# Patient Record
Sex: MALE | Race: ASIAN | NOT HISPANIC OR LATINO | URBAN - METROPOLITAN AREA
[De-identification: names, ages, dates, MRNs, and addresses within clinical notes are randomized per-mention and may not be internally consistent; named-entity substitution may affect disease eponyms.]

---

## 2017-09-28 ENCOUNTER — EMERGENCY (EMERGENCY)
Facility: HOSPITAL | Age: 42
LOS: 1 days | Discharge: HOME | End: 2017-09-28
Attending: EMERGENCY MEDICINE | Admitting: EMERGENCY MEDICINE
Payer: MEDICAID

## 2017-09-28 VITALS
WEIGHT: 250 LBS | TEMPERATURE: 98 F | SYSTOLIC BLOOD PRESSURE: 146 MMHG | RESPIRATION RATE: 18 BRPM | DIASTOLIC BLOOD PRESSURE: 85 MMHG | HEIGHT: 68 IN | HEART RATE: 87 BPM | OXYGEN SATURATION: 99 %

## 2017-09-28 VITALS
HEART RATE: 88 BPM | RESPIRATION RATE: 14 BRPM | SYSTOLIC BLOOD PRESSURE: 128 MMHG | DIASTOLIC BLOOD PRESSURE: 96 MMHG | OXYGEN SATURATION: 100 %

## 2017-09-28 LAB
ALBUMIN SERPL ELPH-MCNC: 3.9 G/DL — SIGNIFICANT CHANGE UP (ref 3.3–5)
ALP SERPL-CCNC: 126 U/L — HIGH (ref 40–120)
ALT FLD-CCNC: 29 U/L — SIGNIFICANT CHANGE UP (ref 4–41)
APPEARANCE UR: SIGNIFICANT CHANGE UP
AST SERPL-CCNC: 20 U/L — SIGNIFICANT CHANGE UP (ref 4–40)
BASOPHILS # BLD AUTO: 0.06 K/UL — SIGNIFICANT CHANGE UP (ref 0–0.2)
BASOPHILS NFR BLD AUTO: 0.7 % — SIGNIFICANT CHANGE UP (ref 0–2)
BILIRUB SERPL-MCNC: 0.4 MG/DL — SIGNIFICANT CHANGE UP (ref 0.2–1.2)
BILIRUB UR-MCNC: SIGNIFICANT CHANGE UP
BLOOD UR QL VISUAL: HIGH
BUN SERPL-MCNC: 15 MG/DL — SIGNIFICANT CHANGE UP (ref 7–23)
CALCIUM SERPL-MCNC: 11.2 MG/DL — HIGH (ref 8.4–10.5)
CHLORIDE SERPL-SCNC: 110 MMOL/L — HIGH (ref 98–107)
CO2 SERPL-SCNC: 20 MMOL/L — LOW (ref 22–31)
COLOR SPEC: HIGH
CREAT SERPL-MCNC: 1.13 MG/DL — SIGNIFICANT CHANGE UP (ref 0.5–1.3)
EOSINOPHIL # BLD AUTO: 0.53 K/UL — HIGH (ref 0–0.5)
EOSINOPHIL NFR BLD AUTO: 5.8 % — SIGNIFICANT CHANGE UP (ref 0–6)
GLUCOSE SERPL-MCNC: 94 MG/DL — SIGNIFICANT CHANGE UP (ref 70–99)
GLUCOSE UR-MCNC: NEGATIVE — SIGNIFICANT CHANGE UP
HCT VFR BLD CALC: 48 % — SIGNIFICANT CHANGE UP (ref 39–50)
HGB BLD-MCNC: 15.6 G/DL — SIGNIFICANT CHANGE UP (ref 13–17)
IMM GRANULOCYTES # BLD AUTO: 0.04 # — SIGNIFICANT CHANGE UP
IMM GRANULOCYTES NFR BLD AUTO: 0.4 % — SIGNIFICANT CHANGE UP (ref 0–1.5)
KETONES UR-MCNC: SIGNIFICANT CHANGE UP
LEUKOCYTE ESTERASE UR-ACNC: HIGH
LYMPHOCYTES # BLD AUTO: 2.96 K/UL — SIGNIFICANT CHANGE UP (ref 1–3.3)
LYMPHOCYTES # BLD AUTO: 32.3 % — SIGNIFICANT CHANGE UP (ref 13–44)
MCHC RBC-ENTMCNC: 29.4 PG — SIGNIFICANT CHANGE UP (ref 27–34)
MCHC RBC-ENTMCNC: 32.5 % — SIGNIFICANT CHANGE UP (ref 32–36)
MCV RBC AUTO: 90.6 FL — SIGNIFICANT CHANGE UP (ref 80–100)
MONOCYTES # BLD AUTO: 0.77 K/UL — SIGNIFICANT CHANGE UP (ref 0–0.9)
MONOCYTES NFR BLD AUTO: 8.4 % — SIGNIFICANT CHANGE UP (ref 2–14)
NEUTROPHILS # BLD AUTO: 4.79 K/UL — SIGNIFICANT CHANGE UP (ref 1.8–7.4)
NEUTROPHILS NFR BLD AUTO: 52.4 % — SIGNIFICANT CHANGE UP (ref 43–77)
NITRITE UR-MCNC: POSITIVE — HIGH
NRBC # FLD: 0 — SIGNIFICANT CHANGE UP
PH UR: 6.5 — SIGNIFICANT CHANGE UP (ref 4.6–8)
PLATELET # BLD AUTO: 225 K/UL — SIGNIFICANT CHANGE UP (ref 150–400)
PMV BLD: 10.9 FL — SIGNIFICANT CHANGE UP (ref 7–13)
POTASSIUM SERPL-MCNC: 4.4 MMOL/L — SIGNIFICANT CHANGE UP (ref 3.5–5.3)
POTASSIUM SERPL-SCNC: 4.4 MMOL/L — SIGNIFICANT CHANGE UP (ref 3.5–5.3)
PROT SERPL-MCNC: 7.2 G/DL — SIGNIFICANT CHANGE UP (ref 6–8.3)
PROT UR-MCNC: 300 — SIGNIFICANT CHANGE UP
RBC # BLD: 5.3 M/UL — SIGNIFICANT CHANGE UP (ref 4.2–5.8)
RBC # FLD: 13.9 % — SIGNIFICANT CHANGE UP (ref 10.3–14.5)
RBC CASTS # UR COMP ASSIST: >50 — HIGH (ref 0–?)
SODIUM SERPL-SCNC: 141 MMOL/L — SIGNIFICANT CHANGE UP (ref 135–145)
SP GR SPEC: 1.01 — SIGNIFICANT CHANGE UP (ref 1–1.03)
UROBILINOGEN FLD QL: 4 E.U. — HIGH (ref 0.1–0.2)
WBC # BLD: 9.15 K/UL — SIGNIFICANT CHANGE UP (ref 3.8–10.5)
WBC # FLD AUTO: 9.15 K/UL — SIGNIFICANT CHANGE UP (ref 3.8–10.5)
WBC UR QL: >50 — HIGH (ref 0–?)

## 2017-09-28 PROCEDURE — 99284 EMERGENCY DEPT VISIT MOD MDM: CPT

## 2017-09-28 PROCEDURE — 74176 CT ABD & PELVIS W/O CONTRAST: CPT | Mod: 26

## 2017-09-28 RX ORDER — ONDANSETRON 8 MG/1
4 TABLET, FILM COATED ORAL ONCE
Qty: 0 | Refills: 0 | Status: COMPLETED | OUTPATIENT
Start: 2017-09-28 | End: 2017-09-28

## 2017-09-28 RX ORDER — MORPHINE SULFATE 50 MG/1
4 CAPSULE, EXTENDED RELEASE ORAL ONCE
Qty: 0 | Refills: 0 | Status: DISCONTINUED | OUTPATIENT
Start: 2017-09-28 | End: 2017-09-28

## 2017-09-28 RX ORDER — CEFTRIAXONE 500 MG/1
1 INJECTION, POWDER, FOR SOLUTION INTRAMUSCULAR; INTRAVENOUS ONCE
Qty: 0 | Refills: 0 | Status: COMPLETED | OUTPATIENT
Start: 2017-09-28 | End: 2017-09-28

## 2017-09-28 RX ORDER — ONDANSETRON 8 MG/1
1 TABLET, FILM COATED ORAL
Qty: 8 | Refills: 0
Start: 2017-09-28

## 2017-09-28 RX ORDER — IBUPROFEN 200 MG
1 TABLET ORAL
Qty: 30 | Refills: 0 | OUTPATIENT
Start: 2017-09-28

## 2017-09-28 RX ORDER — KETOROLAC TROMETHAMINE 30 MG/ML
30 SYRINGE (ML) INJECTION ONCE
Qty: 0 | Refills: 0 | Status: DISCONTINUED | OUTPATIENT
Start: 2017-09-28 | End: 2017-09-28

## 2017-09-28 RX ORDER — MOXIFLOXACIN HYDROCHLORIDE TABLETS, 400 MG 400 MG/1
1 TABLET, FILM COATED ORAL
Qty: 20 | Refills: 0
Start: 2017-09-28 | End: 2017-10-08

## 2017-09-28 RX ADMIN — Medication 30 MILLIGRAM(S): at 13:41

## 2017-09-28 RX ADMIN — CEFTRIAXONE 100 GRAM(S): 500 INJECTION, POWDER, FOR SOLUTION INTRAMUSCULAR; INTRAVENOUS at 14:19

## 2017-09-28 RX ADMIN — ONDANSETRON 4 MILLIGRAM(S): 8 TABLET, FILM COATED ORAL at 13:41

## 2017-09-28 RX ADMIN — MORPHINE SULFATE 4 MILLIGRAM(S): 50 CAPSULE, EXTENDED RELEASE ORAL at 13:41

## 2017-09-28 RX ADMIN — MORPHINE SULFATE 4 MILLIGRAM(S): 50 CAPSULE, EXTENDED RELEASE ORAL at 14:19

## 2017-09-28 RX ADMIN — Medication 30 MILLIGRAM(S): at 14:19

## 2017-09-28 NOTE — ED ADULT NURSE NOTE - OBJECTIVE STATEMENT
pt is a 41 yr old male c/o increased back pain with hematuria x 1 week when pt had renal stents placed. +n/v, pt unable to tolerate po intake, pain 8/10. piv placed, labs sent, see emar for tx

## 2017-09-28 NOTE — ED ADULT NURSE NOTE - CHIEF COMPLAINT QUOTE
pt presents c/o left lower back/flank pain, diagnosed with 2 "large 1-inch" renal colic last week, now with vomiting and cold sweats x 1 week. pt reports pain is worsening with chills and subjective fevers. pt reports hematuria and dysuria. denies use of blood thinners. pt reports taking flomax, pain meds without relief. pt also c/o "heaviness" to right flank. PMHX: renal stents, renal colic

## 2017-09-28 NOTE — ED PROVIDER NOTE - PROGRESS NOTE DETAILS
AJM: Pt feels significantly improved. CT shows patent stents. + UA. Cr normal. Consulted Urology resident who notes CT findings are not concerning based on recent stents. abx for uti. u cx sent. ceftriaxone given here. pt will follow up with urology out patient for stone retrieval. Pt also told of findings on ct regarding pancreas and need for follow up imaging with pmd. pt verbalized understanding of finding.

## 2017-09-28 NOTE — ED PROVIDER NOTE - MEDICAL DECISION MAKING DETAILS
pt with history of renal stones and ureteral stent placement presenting with worsening flank pain. will order ct a/p, pain meds, ua, cbc, cmp. pt already on flomax and pain meds at home

## 2017-09-28 NOTE — ED PROVIDER NOTE - OBJECTIVE STATEMENT
Patient is a 41 year old male with recent diagnosis of kidney stones with placement of ureteral stents presents with worsening right flank pain, nasuea vomiting. he notes pain is in b/l flanks and radiates to b/l groin. + hematuria, chills, sweating,. no abd pain, chest pain, trauma. Has not seen urologist since procedure done in [place] last week Patient is a 41 year old male with recent diagnosis of kidney stones with placement of ureteral stents presents with worsening right flank pain, nausea vomiting. he notes pain is in b/l flanks and radiates to b/l groin. + hematuria, chills, sweating,. no abd pain, chest pain, trauma. Has not seen urologist since procedure done in [place] last week

## 2017-09-29 ENCOUNTER — APPOINTMENT (OUTPATIENT)
Dept: UROLOGY | Facility: CLINIC | Age: 42
End: 2017-09-29
Payer: MEDICAID

## 2017-09-29 VITALS
SYSTOLIC BLOOD PRESSURE: 133 MMHG | HEART RATE: 90 BPM | DIASTOLIC BLOOD PRESSURE: 88 MMHG | WEIGHT: 245 LBS | HEIGHT: 68 IN | BODY MASS INDEX: 37.13 KG/M2 | TEMPERATURE: 98 F

## 2017-09-29 DIAGNOSIS — Z86.39 PERSONAL HISTORY OF OTHER ENDOCRINE, NUTRITIONAL AND METABOLIC DISEASE: ICD-10-CM

## 2017-09-29 DIAGNOSIS — Z78.9 OTHER SPECIFIED HEALTH STATUS: ICD-10-CM

## 2017-09-29 LAB
BACTERIA UR CULT: SIGNIFICANT CHANGE UP
SPECIMEN SOURCE: SIGNIFICANT CHANGE UP

## 2017-09-29 PROCEDURE — 99204 OFFICE O/P NEW MOD 45 MIN: CPT

## 2017-09-29 PROCEDURE — 81003 URINALYSIS AUTO W/O SCOPE: CPT | Mod: QW

## 2017-09-30 LAB
BILIRUB UR QL STRIP: NORMAL
CLARITY UR: NORMAL
COLLECTION METHOD: NORMAL
GLUCOSE UR-MCNC: NORMAL
HCG UR QL: 1 EU/DL
HGB UR QL STRIP.AUTO: NORMAL
KETONES UR-MCNC: NORMAL
LEUKOCYTE ESTERASE UR QL STRIP: NORMAL
NITRITE UR QL STRIP: POSITIVE
PH UR STRIP: 6
PROT UR STRIP-MCNC: NORMAL
SP GR UR STRIP: 1.02

## 2017-10-03 ENCOUNTER — APPOINTMENT (OUTPATIENT)
Dept: UROLOGY | Facility: CLINIC | Age: 42
End: 2017-10-03

## 2017-10-04 ENCOUNTER — APPOINTMENT (OUTPATIENT)
Dept: UROLOGY | Facility: CLINIC | Age: 42
End: 2017-10-04
Payer: MEDICAID

## 2017-10-04 PROCEDURE — 99213 OFFICE O/P EST LOW 20 MIN: CPT

## 2017-10-16 RX ORDER — OXYCODONE HYDROCHLORIDE 5 MG/1
10 TABLET ORAL EVERY 6 HOURS
Qty: 0 | Refills: 0 | Status: DISCONTINUED | OUTPATIENT
Start: 2017-10-17 | End: 2017-10-17

## 2017-10-17 ENCOUNTER — FORM ENCOUNTER (OUTPATIENT)
Age: 42
End: 2017-10-17

## 2017-10-17 ENCOUNTER — OTHER (OUTPATIENT)
Age: 42
End: 2017-10-17

## 2017-10-17 ENCOUNTER — APPOINTMENT (OUTPATIENT)
Dept: UROLOGY | Facility: HOSPITAL | Age: 42
End: 2017-10-17
Payer: MEDICAID

## 2017-10-17 ENCOUNTER — INPATIENT (INPATIENT)
Facility: HOSPITAL | Age: 42
LOS: 1 days | Discharge: HOME | End: 2017-10-19
Attending: STUDENT IN AN ORGANIZED HEALTH CARE EDUCATION/TRAINING PROGRAM | Admitting: STUDENT IN AN ORGANIZED HEALTH CARE EDUCATION/TRAINING PROGRAM
Payer: MEDICAID

## 2017-10-17 ENCOUNTER — RESULT REVIEW (OUTPATIENT)
Age: 42
End: 2017-10-17

## 2017-10-17 VITALS
WEIGHT: 245.59 LBS | HEIGHT: 68 IN | HEART RATE: 85 BPM | OXYGEN SATURATION: 98 % | RESPIRATION RATE: 14 BRPM | TEMPERATURE: 98 F | DIASTOLIC BLOOD PRESSURE: 76 MMHG | SYSTOLIC BLOOD PRESSURE: 107 MMHG

## 2017-10-17 DIAGNOSIS — Z98.890 OTHER SPECIFIED POSTPROCEDURAL STATES: Chronic | ICD-10-CM

## 2017-10-17 LAB
ABO RH CONFIRMATION: SIGNIFICANT CHANGE UP
ANION GAP SERPL CALC-SCNC: 5 MMOL/L — SIGNIFICANT CHANGE UP (ref 5–17)
ANION GAP SERPL CALC-SCNC: 5 MMOL/L — SIGNIFICANT CHANGE UP (ref 5–17)
BASOPHILS # BLD AUTO: 0.1 K/UL — SIGNIFICANT CHANGE UP (ref 0–0.2)
BASOPHILS NFR BLD AUTO: 0.7 % — SIGNIFICANT CHANGE UP (ref 0–2)
BLD GP AB SCN SERPL QL: SIGNIFICANT CHANGE UP
BUN SERPL-MCNC: 18 MG/DL — SIGNIFICANT CHANGE UP (ref 7–23)
BUN SERPL-MCNC: 22 MG/DL — SIGNIFICANT CHANGE UP (ref 7–23)
CALCIUM SERPL-MCNC: 10.6 MG/DL — HIGH (ref 8.5–10.1)
CALCIUM SERPL-MCNC: 8.7 MG/DL — SIGNIFICANT CHANGE UP (ref 8.5–10.1)
CHLORIDE SERPL-SCNC: 112 MMOL/L — HIGH (ref 96–108)
CHLORIDE SERPL-SCNC: 119 MMOL/L — HIGH (ref 96–108)
CO2 SERPL-SCNC: 20 MMOL/L — LOW (ref 22–31)
CO2 SERPL-SCNC: 23 MMOL/L — SIGNIFICANT CHANGE UP (ref 22–31)
CREAT SERPL-MCNC: 1.27 MG/DL — SIGNIFICANT CHANGE UP (ref 0.5–1.3)
CREAT SERPL-MCNC: 1.36 MG/DL — HIGH (ref 0.5–1.3)
EOSINOPHIL # BLD AUTO: 0.7 K/UL — HIGH (ref 0–0.5)
EOSINOPHIL NFR BLD AUTO: 5.8 % — SIGNIFICANT CHANGE UP (ref 0–6)
GLUCOSE BLDC GLUCOMTR-MCNC: 111 MG/DL — HIGH (ref 70–99)
GLUCOSE SERPL-MCNC: 107 MG/DL — HIGH (ref 70–99)
GLUCOSE SERPL-MCNC: 132 MG/DL — HIGH (ref 70–99)
HCT VFR BLD CALC: 39 % — SIGNIFICANT CHANGE UP (ref 39–50)
HCT VFR BLD CALC: 39.8 % — SIGNIFICANT CHANGE UP (ref 39–50)
HCT VFR BLD CALC: 46.9 % — SIGNIFICANT CHANGE UP (ref 39–50)
HGB BLD-MCNC: 13.2 G/DL — SIGNIFICANT CHANGE UP (ref 13–17)
HGB BLD-MCNC: 13.2 G/DL — SIGNIFICANT CHANGE UP (ref 13–17)
HGB BLD-MCNC: 15.6 G/DL — SIGNIFICANT CHANGE UP (ref 13–17)
INR BLD: 1.08 RATIO — SIGNIFICANT CHANGE UP (ref 0.88–1.16)
LYMPHOCYTES # BLD AUTO: 2.9 K/UL — SIGNIFICANT CHANGE UP (ref 1–3.3)
LYMPHOCYTES # BLD AUTO: 25 % — SIGNIFICANT CHANGE UP (ref 13–44)
MCHC RBC-ENTMCNC: 30 PG — SIGNIFICANT CHANGE UP (ref 27–34)
MCHC RBC-ENTMCNC: 30.2 PG — SIGNIFICANT CHANGE UP (ref 27–34)
MCHC RBC-ENTMCNC: 30.5 PG — SIGNIFICANT CHANGE UP (ref 27–34)
MCHC RBC-ENTMCNC: 33.1 GM/DL — SIGNIFICANT CHANGE UP (ref 32–36)
MCHC RBC-ENTMCNC: 33.3 GM/DL — SIGNIFICANT CHANGE UP (ref 32–36)
MCHC RBC-ENTMCNC: 33.8 GM/DL — SIGNIFICANT CHANGE UP (ref 32–36)
MCV RBC AUTO: 90.2 FL — SIGNIFICANT CHANGE UP (ref 80–100)
MCV RBC AUTO: 90.3 FL — SIGNIFICANT CHANGE UP (ref 80–100)
MCV RBC AUTO: 91.4 FL — SIGNIFICANT CHANGE UP (ref 80–100)
MONOCYTES # BLD AUTO: 0.9 K/UL — SIGNIFICANT CHANGE UP (ref 0–0.9)
MONOCYTES NFR BLD AUTO: 7.9 % — SIGNIFICANT CHANGE UP (ref 2–14)
NEUTROPHILS # BLD AUTO: 7 K/UL — SIGNIFICANT CHANGE UP (ref 1.8–7.4)
NEUTROPHILS NFR BLD AUTO: 60.6 % — SIGNIFICANT CHANGE UP (ref 43–77)
PLATELET # BLD AUTO: 185 K/UL — SIGNIFICANT CHANGE UP (ref 150–400)
PLATELET # BLD AUTO: 211 K/UL — SIGNIFICANT CHANGE UP (ref 150–400)
PLATELET # BLD AUTO: 214 K/UL — SIGNIFICANT CHANGE UP (ref 150–400)
POTASSIUM SERPL-MCNC: 4.3 MMOL/L — SIGNIFICANT CHANGE UP (ref 3.5–5.3)
POTASSIUM SERPL-MCNC: 4.7 MMOL/L — SIGNIFICANT CHANGE UP (ref 3.5–5.3)
POTASSIUM SERPL-SCNC: 4.3 MMOL/L — SIGNIFICANT CHANGE UP (ref 3.5–5.3)
POTASSIUM SERPL-SCNC: 4.7 MMOL/L — SIGNIFICANT CHANGE UP (ref 3.5–5.3)
PROTHROM AB SERPL-ACNC: 11.7 SEC — SIGNIFICANT CHANGE UP (ref 9.8–12.7)
RBC # BLD: 4.32 M/UL — SIGNIFICANT CHANGE UP (ref 4.2–5.8)
RBC # BLD: 4.36 M/UL — SIGNIFICANT CHANGE UP (ref 4.2–5.8)
RBC # BLD: 5.2 M/UL — SIGNIFICANT CHANGE UP (ref 4.2–5.8)
RBC # FLD: 12.3 % — SIGNIFICANT CHANGE UP (ref 10.3–14.5)
RBC # FLD: 12.4 % — SIGNIFICANT CHANGE UP (ref 10.3–14.5)
RBC # FLD: 12.5 % — SIGNIFICANT CHANGE UP (ref 10.3–14.5)
SODIUM SERPL-SCNC: 140 MMOL/L — SIGNIFICANT CHANGE UP (ref 135–145)
SODIUM SERPL-SCNC: 144 MMOL/L — SIGNIFICANT CHANGE UP (ref 135–145)
TYPE + AB SCN PNL BLD: SIGNIFICANT CHANGE UP
WBC # BLD: 10.5 K/UL — SIGNIFICANT CHANGE UP (ref 3.8–10.5)
WBC # BLD: 11.6 K/UL — HIGH (ref 3.8–10.5)
WBC # BLD: 15 K/UL — HIGH (ref 3.8–10.5)
WBC # FLD AUTO: 10.5 K/UL — SIGNIFICANT CHANGE UP (ref 3.8–10.5)
WBC # FLD AUTO: 11.6 K/UL — HIGH (ref 3.8–10.5)
WBC # FLD AUTO: 15 K/UL — HIGH (ref 3.8–10.5)

## 2017-10-17 PROCEDURE — 50432 PLMT NEPHROSTOMY CATHETER: CPT | Mod: RT

## 2017-10-17 PROCEDURE — 88300 SURGICAL PATH GROSS: CPT | Mod: 26

## 2017-10-17 PROCEDURE — 50081 PERQ NL/PL LITHOTRP CPLX>2CM: CPT | Mod: RT

## 2017-10-17 RX ORDER — ONDANSETRON 8 MG/1
4 TABLET, FILM COATED ORAL ONCE
Qty: 0 | Refills: 0 | Status: DISCONTINUED | OUTPATIENT
Start: 2017-10-17 | End: 2017-10-17

## 2017-10-17 RX ORDER — ONDANSETRON 8 MG/1
4 TABLET, FILM COATED ORAL EVERY 6 HOURS
Qty: 0 | Refills: 0 | Status: DISCONTINUED | OUTPATIENT
Start: 2017-10-17 | End: 2017-10-19

## 2017-10-17 RX ORDER — PIPERACILLIN AND TAZOBACTAM 4; .5 G/20ML; G/20ML
3.38 INJECTION, POWDER, LYOPHILIZED, FOR SOLUTION INTRAVENOUS ONCE
Qty: 0 | Refills: 0 | Status: COMPLETED | OUTPATIENT
Start: 2017-10-17 | End: 2017-10-17

## 2017-10-17 RX ORDER — ACETAMINOPHEN 500 MG
650 TABLET ORAL EVERY 6 HOURS
Qty: 0 | Refills: 0 | Status: DISCONTINUED | OUTPATIENT
Start: 2017-10-17 | End: 2017-10-19

## 2017-10-17 RX ORDER — HYDROMORPHONE HYDROCHLORIDE 2 MG/ML
1 INJECTION INTRAMUSCULAR; INTRAVENOUS; SUBCUTANEOUS ONCE
Qty: 0 | Refills: 0 | Status: DISCONTINUED | OUTPATIENT
Start: 2017-10-17 | End: 2017-10-17

## 2017-10-17 RX ORDER — KETOROLAC TROMETHAMINE 30 MG/ML
30 SYRINGE (ML) INJECTION EVERY 6 HOURS
Qty: 0 | Refills: 0 | Status: DISCONTINUED | OUTPATIENT
Start: 2017-10-17 | End: 2017-10-19

## 2017-10-17 RX ORDER — MORPHINE SULFATE 50 MG/1
2 CAPSULE, EXTENDED RELEASE ORAL EVERY 6 HOURS
Qty: 0 | Refills: 0 | Status: DISCONTINUED | OUTPATIENT
Start: 2017-10-17 | End: 2017-10-19

## 2017-10-17 RX ORDER — FENTANYL CITRATE 50 UG/ML
50 INJECTION INTRAVENOUS
Qty: 0 | Refills: 0 | Status: DISCONTINUED | OUTPATIENT
Start: 2017-10-17 | End: 2017-10-17

## 2017-10-17 RX ORDER — VANCOMYCIN HCL 1 G
1000 VIAL (EA) INTRAVENOUS ONCE
Qty: 0 | Refills: 0 | Status: COMPLETED | OUTPATIENT
Start: 2017-10-17 | End: 2017-10-18

## 2017-10-17 RX ORDER — SODIUM CHLORIDE 9 MG/ML
1000 INJECTION, SOLUTION INTRAVENOUS
Qty: 0 | Refills: 0 | Status: DISCONTINUED | OUTPATIENT
Start: 2017-10-17 | End: 2017-10-17

## 2017-10-17 RX ORDER — TAMSULOSIN HYDROCHLORIDE 0.4 MG/1
0.4 CAPSULE ORAL DAILY
Qty: 0 | Refills: 0 | Status: DISCONTINUED | OUTPATIENT
Start: 2017-10-17 | End: 2017-10-19

## 2017-10-17 RX ORDER — SODIUM CHLORIDE 9 MG/ML
1000 INJECTION INTRAMUSCULAR; INTRAVENOUS; SUBCUTANEOUS
Qty: 0 | Refills: 0 | Status: DISCONTINUED | OUTPATIENT
Start: 2017-10-17 | End: 2017-10-17

## 2017-10-17 RX ORDER — SODIUM CHLORIDE 9 MG/ML
1000 INJECTION INTRAMUSCULAR; INTRAVENOUS; SUBCUTANEOUS
Qty: 0 | Refills: 0 | Status: COMPLETED | OUTPATIENT
Start: 2017-10-17 | End: 2017-10-18

## 2017-10-17 RX ORDER — PHENAZOPYRIDINE HCL 100 MG
100 TABLET ORAL THREE TIMES A DAY
Qty: 0 | Refills: 0 | Status: DISCONTINUED | OUTPATIENT
Start: 2017-10-17 | End: 2017-10-19

## 2017-10-17 RX ORDER — OXYBUTYNIN CHLORIDE 5 MG
5 TABLET ORAL THREE TIMES A DAY
Qty: 0 | Refills: 0 | Status: DISCONTINUED | OUTPATIENT
Start: 2017-10-17 | End: 2017-10-19

## 2017-10-17 RX ORDER — PIPERACILLIN AND TAZOBACTAM 4; .5 G/20ML; G/20ML
3.38 INJECTION, POWDER, LYOPHILIZED, FOR SOLUTION INTRAVENOUS EVERY 8 HOURS
Qty: 0 | Refills: 0 | Status: DISCONTINUED | OUTPATIENT
Start: 2017-10-17 | End: 2017-10-19

## 2017-10-17 RX ORDER — SODIUM CHLORIDE 9 MG/ML
1000 INJECTION INTRAMUSCULAR; INTRAVENOUS; SUBCUTANEOUS
Qty: 0 | Refills: 0 | Status: DISCONTINUED | OUTPATIENT
Start: 2017-10-17 | End: 2017-10-19

## 2017-10-17 RX ADMIN — FENTANYL CITRATE 50 MICROGRAM(S): 50 INJECTION INTRAVENOUS at 19:30

## 2017-10-17 RX ADMIN — ONDANSETRON 4 MILLIGRAM(S): 8 TABLET, FILM COATED ORAL at 20:45

## 2017-10-17 RX ADMIN — OXYCODONE HYDROCHLORIDE 10 MILLIGRAM(S): 5 TABLET ORAL at 17:57

## 2017-10-17 RX ADMIN — Medication 650 MILLIGRAM(S): at 22:35

## 2017-10-17 RX ADMIN — FENTANYL CITRATE 50 MICROGRAM(S): 50 INJECTION INTRAVENOUS at 18:13

## 2017-10-17 RX ADMIN — MORPHINE SULFATE 2 MILLIGRAM(S): 50 CAPSULE, EXTENDED RELEASE ORAL at 22:45

## 2017-10-17 RX ADMIN — FENTANYL CITRATE 50 MICROGRAM(S): 50 INJECTION INTRAVENOUS at 17:27

## 2017-10-17 RX ADMIN — HYDROMORPHONE HYDROCHLORIDE 1 MILLIGRAM(S): 2 INJECTION INTRAMUSCULAR; INTRAVENOUS; SUBCUTANEOUS at 23:49

## 2017-10-17 RX ADMIN — FENTANYL CITRATE 50 MICROGRAM(S): 50 INJECTION INTRAVENOUS at 17:06

## 2017-10-17 RX ADMIN — OXYCODONE HYDROCHLORIDE 10 MILLIGRAM(S): 5 TABLET ORAL at 17:49

## 2017-10-17 RX ADMIN — TAMSULOSIN HYDROCHLORIDE 0.4 MILLIGRAM(S): 0.4 CAPSULE ORAL at 22:35

## 2017-10-17 RX ADMIN — Medication 30 MILLIGRAM(S): at 19:41

## 2017-10-17 RX ADMIN — Medication 100 MILLIGRAM(S): at 22:57

## 2017-10-17 RX ADMIN — SODIUM CHLORIDE 100 MILLILITER(S): 9 INJECTION INTRAMUSCULAR; INTRAVENOUS; SUBCUTANEOUS at 12:52

## 2017-10-17 RX ADMIN — FENTANYL CITRATE 50 MICROGRAM(S): 50 INJECTION INTRAVENOUS at 16:57

## 2017-10-17 RX ADMIN — SODIUM CHLORIDE 125 MILLILITER(S): 9 INJECTION INTRAMUSCULAR; INTRAVENOUS; SUBCUTANEOUS at 22:36

## 2017-10-17 RX ADMIN — SODIUM CHLORIDE 1000 MILLILITER(S): 9 INJECTION INTRAMUSCULAR; INTRAVENOUS; SUBCUTANEOUS at 23:39

## 2017-10-17 RX ADMIN — SODIUM CHLORIDE 100 MILLILITER(S): 9 INJECTION INTRAMUSCULAR; INTRAVENOUS; SUBCUTANEOUS at 17:06

## 2017-10-17 RX ADMIN — FENTANYL CITRATE 50 MICROGRAM(S): 50 INJECTION INTRAVENOUS at 12:52

## 2017-10-17 RX ADMIN — Medication 30 MILLIGRAM(S): at 19:37

## 2017-10-17 RX ADMIN — PIPERACILLIN AND TAZOBACTAM 200 GRAM(S): 4; .5 INJECTION, POWDER, LYOPHILIZED, FOR SOLUTION INTRAVENOUS at 23:43

## 2017-10-17 NOTE — BRIEF OPERATIVE NOTE - PROCEDURE
<<-----Click on this checkbox to enter Procedure Percutaneous nephrolithotomy  10/17/2017  RIGHT PCNL  Active  [email or web address]

## 2017-10-17 NOTE — BRIEF OPERATIVE NOTE - OPERATION/FINDINGS
sono and fluoro. mutiple large stones right kidney. lower pole accessed. able to cross calyceal and infundibular stones, but not upj stone with mutiple catheter and wire combinations. following d/w urologist. 8fr pcn left in place for access. there is also an indwelling JJ stent which may be helpful for retrograde access if needed. pcn left to gravity draining bloody urine.
Multiple large stones in right lower pole cleared fragmented and extracted. Renal pelvis stone could not be reached

## 2017-10-17 NOTE — CHART NOTE - NSCHARTNOTEFT_GEN_A_CORE
Code Sepsis was called on this patient at 22:50 .    HPI : 42 y/o man with right flank pain admitted for kidney stones s/p Right percutaneous nephrolithotomy today by Urology (Dr. Root). Patient was noted to have right flank swelling and erythema by RN. Vitals and ROS are as below.      REVIEW OF SYSTEMS:  CONSTITUTIONAL: Fevers, chills  EYES/ENT: No visual changes;  No vertigo or throat pain   NECK: No pain or stiffness  RESPIRATORY: No cough, wheezing, hemoptysis; No shortness of breath  CARDIOVASCULAR: No chest pain or palpitations  GASTROINTESTINAL: No abdominal or epigastric pain. No nausea, vomiting, or hematemesis; No diarrhea or constipation. No melena or hematochezia.  GENITOURINARY: + Right sided flank pain; voiding via Garcia  NEUROLOGICAL: No numbness or weakness  All other review of systems is negative unless indicated above    Vital Signs Last 24 Hrs  T(C): 38.9 (17 Oct 2017 22:59), Max: 38.9 (17 Oct 2017 22:59)  T(F): 102.1 (17 Oct 2017 22:59), Max: 102.1 (17 Oct 2017 22:59)  HR: 115 (17 Oct 2017 22:59) (59 - 116)  BP: 115/72 (17 Oct 2017 22:59) (107/76 - 144/92)  RR: 22 (17 Oct 2017 22:59) (12 - 22)  SpO2: 98% (17 Oct 2017 22:59) (96% - 100%)    I&O's Summary    17 Oct 2017 07:01  -  17 Oct 2017 23:14  --------------------------------------------------------  IN: 2900 mL / OUT: 1900 mL / NET: 1000 mL      CAPILLARY BLOOD GLUCOSE  POCT Blood Glucose.: 111 mg/dL (17 Oct 2017 23:07)      PHYSICAL EXAM:  Constitutional: shaking in rigors, in distress due to fever and pain  HEENT: PERR, EOMI, Normal Hearing, MMM  Neck: Soft and supple, No LAD, No JVD  Respiratory: Breath sounds are clear bilaterally, No wheezing, rales or rhonchi  Cardiovascular: S1 and S2, Tachycardic, regular rhythm, no Murmurs, gallops or rubs  Gastrointestinal: Bowel Sounds present, soft, nontender, nondistended, no guarding, no rebound  Genitourinary: + Right CVA tenderness; right flank erythema with underlying hematoma soft and tender to touch; Garcia catheter in place  Extremities: No peripheral edema  Vascular: 2+ peripheral pulses  Neurological: A/O x 3, no focal deficits  Musculoskeletal: 5/5 strength b/l upper and lower extremities  Skin: No rashes    MEDICATIONS:  MEDICATIONS  (STANDING):  phenazopyridine 100 milliGRAM(s) Oral three times a day  sodium chloride 0.9%. 1000 milliLiter(s) (125 mL/Hr) IV Continuous <Continuous>  tamsulosin 0.4 milliGRAM(s) Oral daily      LABS: All Labs Reviewed:                        13.2   15.0  )-----------( 185      ( 17 Oct 2017 19:37 )             39.0     10-17    144  |  119<H>  |  18  ----------------------------<  132<H>  4.7   |  20<L>  |  1.36<H>    Ca    8.7      17 Oct 2017 19:37      PT/INR - ( 17 Oct 2017 09:00 )   PT: 11.7 sec;   INR: 1.08 ratio         A/P  42 y/o man with right flank pain admitted for kidney stones s/p Right percutaneous nephrolithotomy today presents tonight with fever to 102.1, tachycardia to 115, with child and rigors on physical exam. Patient has Garcia in place and has right flank pain, swelling and erythema.  - Bolus of NS  -  UA STAT  - CBC, BMP, Lactate STAT  - Blood Cx STAT  - IV Zosyn/ Vanco STAT x 1  - F/U ID Consult    Case d/w Dr. Rolly Raygoza (PGY-3) Code Sepsis was called on this patient at 22:50 .    HPI : 42 y/o man with right flank pain admitted for kidney stones s/p Right percutaneous nephrolithotomy today by Urology (Dr. Root). Patient was noted to have right flank swelling and erythema by RN. Vitals and ROS are as below.      REVIEW OF SYSTEMS:  CONSTITUTIONAL: Fevers, chills  EYES/ENT: No visual changes;  No vertigo or throat pain   NECK: No pain or stiffness  RESPIRATORY: No cough, wheezing, hemoptysis; No shortness of breath  CARDIOVASCULAR: No chest pain or palpitations  GASTROINTESTINAL: No abdominal or epigastric pain. No nausea, vomiting, or hematemesis; No diarrhea or constipation. No melena or hematochezia.  GENITOURINARY: + Right sided flank pain; voiding via Garcia  NEUROLOGICAL: No numbness or weakness  All other review of systems is negative unless indicated above    Vital Signs Last 24 Hrs  T(C): 38.9 (17 Oct 2017 22:59), Max: 38.9 (17 Oct 2017 22:59)  T(F): 102.1 (17 Oct 2017 22:59), Max: 102.1 (17 Oct 2017 22:59)  HR: 115 (17 Oct 2017 22:59) (59 - 116)  BP: 115/72 (17 Oct 2017 22:59) (107/76 - 144/92)  RR: 22 (17 Oct 2017 22:59) (12 - 22)  SpO2: 98% (17 Oct 2017 22:59) (96% - 100%)    I&O's Summary    17 Oct 2017 07:01  -  17 Oct 2017 23:14  --------------------------------------------------------  IN: 2900 mL / OUT: 1900 mL / NET: 1000 mL      CAPILLARY BLOOD GLUCOSE  POCT Blood Glucose.: 111 mg/dL (17 Oct 2017 23:07)      PHYSICAL EXAM:  Constitutional: shaking in rigors, in distress due to fever and pain  HEENT: PERR, EOMI, Normal Hearing, MMM  Neck: Soft and supple, No LAD, No JVD  Respiratory: Breath sounds are clear bilaterally, No wheezing, rales or rhonchi  Cardiovascular: S1 and S2, Tachycardic, regular rhythm, no Murmurs, gallops or rubs  Gastrointestinal: Bowel Sounds present, soft, nontender, nondistended, no guarding, no rebound  Genitourinary: + Right CVA tenderness; right flank erythema with underlying hematoma soft and tender to touch; Garcia catheter in place  Extremities: No peripheral edema  Vascular: 2+ peripheral pulses  Neurological: A/O x 3, no focal deficits  Musculoskeletal: 5/5 strength b/l upper and lower extremities  Skin: No rashes    MEDICATIONS:  MEDICATIONS  (STANDING):  phenazopyridine 100 milliGRAM(s) Oral three times a day  sodium chloride 0.9%. 1000 milliLiter(s) (125 mL/Hr) IV Continuous <Continuous>  tamsulosin 0.4 milliGRAM(s) Oral daily      LABS: All Labs Reviewed:                        13.2   15.0  )-----------( 185      ( 17 Oct 2017 19:37 )             39.0     10-17    144  |  119<H>  |  18  ----------------------------<  132<H>  4.7   |  20<L>  |  1.36<H>    Ca    8.7      17 Oct 2017 19:37      PT/INR - ( 17 Oct 2017 09:00 )   PT: 11.7 sec;   INR: 1.08 ratio         A/P  42 y/o man with right flank pain admitted for kidney stones s/p Right percutaneous nephrolithotomy today presents tonight with fever to 102.1, tachycardia to 115, with child and rigors on physical exam. Patient has Garcia in place and has right flank pain, swelling and erythema.  - 3L Bolus of NS  -  UA STAT  - CBC, BMP, Lactate STAT  - Blood Cx STAT  - IV Zosyn/ Vanco STAT x 1  - F/U ID Consult    Case d/w Dr. Rolly Raygoza (PGY-3)

## 2017-10-17 NOTE — PROVIDER CONTACT NOTE (CHANGE IN STATUS NOTIFICATION) - BACKGROUND
S/P Right percutaneous nephrolithotomy today with MD Root  with known hematoma right flank  Garcia in place red output

## 2017-10-17 NOTE — PROVIDER CONTACT NOTE (OTHER) - ASSESSMENT
Right flank noted to be swollen, bruised, very tender to touch, and warm.  VSS.  No other complaints

## 2017-10-17 NOTE — PATIENT PROFILE ADULT. - TEACHING/LEARNING LEARNING PREFERENCES
written material/verbal instruction/skill demonstration/computer/internet/video/individual instruction

## 2017-10-17 NOTE — BRIEF OPERATIVE NOTE - PROCEDURE
<<-----Click on this checkbox to enter Procedure Nephrostomy using imaging guidance  10/17/2017    Active  [id number]

## 2017-10-18 LAB
ANION GAP SERPL CALC-SCNC: 5 MMOL/L — SIGNIFICANT CHANGE UP (ref 5–17)
ANION GAP SERPL CALC-SCNC: 8 MMOL/L — SIGNIFICANT CHANGE UP (ref 5–17)
APPEARANCE UR: (no result)
BACTERIA # UR AUTO: (no result)
BASOPHILS # BLD AUTO: 0 K/UL — SIGNIFICANT CHANGE UP (ref 0–0.2)
BASOPHILS NFR BLD AUTO: 0.3 % — SIGNIFICANT CHANGE UP (ref 0–2)
BILIRUB UR-MCNC: NEGATIVE — SIGNIFICANT CHANGE UP
BUN SERPL-MCNC: 15 MG/DL — SIGNIFICANT CHANGE UP (ref 7–23)
BUN SERPL-MCNC: 15 MG/DL — SIGNIFICANT CHANGE UP (ref 7–23)
CALCIUM SERPL-MCNC: 8.8 MG/DL — SIGNIFICANT CHANGE UP (ref 8.5–10.1)
CALCIUM SERPL-MCNC: 8.9 MG/DL — SIGNIFICANT CHANGE UP (ref 8.5–10.1)
CHLORIDE SERPL-SCNC: 118 MMOL/L — HIGH (ref 96–108)
CHLORIDE SERPL-SCNC: 119 MMOL/L — HIGH (ref 96–108)
CO2 SERPL-SCNC: 19 MMOL/L — LOW (ref 22–31)
CO2 SERPL-SCNC: 21 MMOL/L — LOW (ref 22–31)
COLOR SPEC: (no result)
CREAT SERPL-MCNC: 1.36 MG/DL — HIGH (ref 0.5–1.3)
CREAT SERPL-MCNC: 1.42 MG/DL — HIGH (ref 0.5–1.3)
DIFF PNL FLD: (no result)
EOSINOPHIL # BLD AUTO: 0 K/UL — SIGNIFICANT CHANGE UP (ref 0–0.5)
EOSINOPHIL NFR BLD AUTO: 0.2 % — SIGNIFICANT CHANGE UP (ref 0–6)
EPI CELLS # UR: SIGNIFICANT CHANGE UP
GLUCOSE SERPL-MCNC: 104 MG/DL — HIGH (ref 70–99)
GLUCOSE SERPL-MCNC: 115 MG/DL — HIGH (ref 70–99)
GLUCOSE UR QL: NEGATIVE MG/DL — SIGNIFICANT CHANGE UP
HCT VFR BLD CALC: 30.5 % — LOW (ref 39–50)
HCT VFR BLD CALC: 35.9 % — LOW (ref 39–50)
HGB BLD-MCNC: 10.3 G/DL — LOW (ref 13–17)
HGB BLD-MCNC: 11.7 G/DL — LOW (ref 13–17)
KETONES UR-MCNC: NEGATIVE — SIGNIFICANT CHANGE UP
LACTATE SERPL-SCNC: 1.4 MMOL/L — SIGNIFICANT CHANGE UP (ref 0.7–2)
LDH SERPL L TO P-CCNC: 157 U/L — SIGNIFICANT CHANGE UP (ref 50–242)
LEUKOCYTE ESTERASE UR-ACNC: (no result)
LYMPHOCYTES # BLD AUTO: 1.3 K/UL — SIGNIFICANT CHANGE UP (ref 1–3.3)
LYMPHOCYTES # BLD AUTO: 10 % — LOW (ref 13–44)
MCHC RBC-ENTMCNC: 29.8 PG — SIGNIFICANT CHANGE UP (ref 27–34)
MCHC RBC-ENTMCNC: 30.2 PG — SIGNIFICANT CHANGE UP (ref 27–34)
MCHC RBC-ENTMCNC: 32.6 GM/DL — SIGNIFICANT CHANGE UP (ref 32–36)
MCHC RBC-ENTMCNC: 33.8 GM/DL — SIGNIFICANT CHANGE UP (ref 32–36)
MCV RBC AUTO: 89.4 FL — SIGNIFICANT CHANGE UP (ref 80–100)
MCV RBC AUTO: 91.3 FL — SIGNIFICANT CHANGE UP (ref 80–100)
MONOCYTES # BLD AUTO: 1.2 K/UL — HIGH (ref 0–0.9)
MONOCYTES NFR BLD AUTO: 9.2 % — SIGNIFICANT CHANGE UP (ref 2–14)
NEUTROPHILS # BLD AUTO: 10.4 K/UL — HIGH (ref 1.8–7.4)
NEUTROPHILS NFR BLD AUTO: 80.4 % — HIGH (ref 43–77)
NITRITE UR-MCNC: NEGATIVE — SIGNIFICANT CHANGE UP
PH UR: 5 — SIGNIFICANT CHANGE UP (ref 5–8)
PLATELET # BLD AUTO: 157 K/UL — SIGNIFICANT CHANGE UP (ref 150–400)
PLATELET # BLD AUTO: 184 K/UL — SIGNIFICANT CHANGE UP (ref 150–400)
POTASSIUM SERPL-MCNC: 4.2 MMOL/L — SIGNIFICANT CHANGE UP (ref 3.5–5.3)
POTASSIUM SERPL-MCNC: 4.5 MMOL/L — SIGNIFICANT CHANGE UP (ref 3.5–5.3)
POTASSIUM SERPL-SCNC: 4.2 MMOL/L — SIGNIFICANT CHANGE UP (ref 3.5–5.3)
POTASSIUM SERPL-SCNC: 4.5 MMOL/L — SIGNIFICANT CHANGE UP (ref 3.5–5.3)
PROT UR-MCNC: 100 MG/DL
RBC # BLD: 3.42 M/UL — LOW (ref 4.2–5.8)
RBC # BLD: 3.93 M/UL — LOW (ref 4.2–5.8)
RBC # FLD: 12 % — SIGNIFICANT CHANGE UP (ref 10.3–14.5)
RBC # FLD: 12.2 % — SIGNIFICANT CHANGE UP (ref 10.3–14.5)
RBC CASTS # UR COMP ASSIST: >50 /HPF (ref 0–4)
SODIUM SERPL-SCNC: 145 MMOL/L — SIGNIFICANT CHANGE UP (ref 135–145)
SODIUM SERPL-SCNC: 145 MMOL/L — SIGNIFICANT CHANGE UP (ref 135–145)
SP GR SPEC: 1.01 — SIGNIFICANT CHANGE UP (ref 1.01–1.02)
UROBILINOGEN FLD QL: NEGATIVE MG/DL — SIGNIFICANT CHANGE UP
WBC # BLD: 12.9 K/UL — HIGH (ref 3.8–10.5)
WBC # BLD: 13.7 K/UL — HIGH (ref 3.8–10.5)
WBC # FLD AUTO: 12.9 K/UL — HIGH (ref 3.8–10.5)
WBC # FLD AUTO: 13.7 K/UL — HIGH (ref 3.8–10.5)
WBC UR QL: (no result)

## 2017-10-18 PROCEDURE — 93010 ELECTROCARDIOGRAM REPORT: CPT

## 2017-10-18 PROCEDURE — 71010: CPT | Mod: 26

## 2017-10-18 PROCEDURE — 74177 CT ABD & PELVIS W/CONTRAST: CPT | Mod: 26

## 2017-10-18 RX ADMIN — Medication 30 MILLIGRAM(S): at 01:20

## 2017-10-18 RX ADMIN — MORPHINE SULFATE 2 MILLIGRAM(S): 50 CAPSULE, EXTENDED RELEASE ORAL at 00:03

## 2017-10-18 RX ADMIN — Medication 30 MILLIGRAM(S): at 21:49

## 2017-10-18 RX ADMIN — SODIUM CHLORIDE 1000 MILLILITER(S): 9 INJECTION INTRAMUSCULAR; INTRAVENOUS; SUBCUTANEOUS at 00:15

## 2017-10-18 RX ADMIN — SODIUM CHLORIDE 1000 MILLILITER(S): 9 INJECTION INTRAMUSCULAR; INTRAVENOUS; SUBCUTANEOUS at 01:20

## 2017-10-18 RX ADMIN — Medication 30 MILLIGRAM(S): at 01:35

## 2017-10-18 RX ADMIN — Medication 100 MILLIGRAM(S): at 05:41

## 2017-10-18 RX ADMIN — Medication 650 MILLIGRAM(S): at 18:13

## 2017-10-18 RX ADMIN — MORPHINE SULFATE 2 MILLIGRAM(S): 50 CAPSULE, EXTENDED RELEASE ORAL at 06:46

## 2017-10-18 RX ADMIN — PIPERACILLIN AND TAZOBACTAM 25 GRAM(S): 4; .5 INJECTION, POWDER, LYOPHILIZED, FOR SOLUTION INTRAVENOUS at 05:41

## 2017-10-18 RX ADMIN — Medication 100 MILLIGRAM(S): at 21:49

## 2017-10-18 RX ADMIN — MORPHINE SULFATE 2 MILLIGRAM(S): 50 CAPSULE, EXTENDED RELEASE ORAL at 14:25

## 2017-10-18 RX ADMIN — PIPERACILLIN AND TAZOBACTAM 25 GRAM(S): 4; .5 INJECTION, POWDER, LYOPHILIZED, FOR SOLUTION INTRAVENOUS at 21:49

## 2017-10-18 RX ADMIN — MORPHINE SULFATE 2 MILLIGRAM(S): 50 CAPSULE, EXTENDED RELEASE ORAL at 06:04

## 2017-10-18 RX ADMIN — TAMSULOSIN HYDROCHLORIDE 0.4 MILLIGRAM(S): 0.4 CAPSULE ORAL at 11:27

## 2017-10-18 RX ADMIN — Medication 650 MILLIGRAM(S): at 06:04

## 2017-10-18 RX ADMIN — Medication 30 MILLIGRAM(S): at 22:00

## 2017-10-18 RX ADMIN — HYDROMORPHONE HYDROCHLORIDE 1 MILLIGRAM(S): 2 INJECTION INTRAMUSCULAR; INTRAVENOUS; SUBCUTANEOUS at 00:03

## 2017-10-18 RX ADMIN — Medication 30 MILLIGRAM(S): at 11:27

## 2017-10-18 RX ADMIN — Medication 100 MILLIGRAM(S): at 14:12

## 2017-10-18 RX ADMIN — Medication 250 MILLIGRAM(S): at 00:16

## 2017-10-18 RX ADMIN — Medication 650 MILLIGRAM(S): at 00:03

## 2017-10-18 RX ADMIN — MORPHINE SULFATE 2 MILLIGRAM(S): 50 CAPSULE, EXTENDED RELEASE ORAL at 14:12

## 2017-10-18 RX ADMIN — PIPERACILLIN AND TAZOBACTAM 25 GRAM(S): 4; .5 INJECTION, POWDER, LYOPHILIZED, FOR SOLUTION INTRAVENOUS at 14:11

## 2017-10-18 RX ADMIN — Medication 30 MILLIGRAM(S): at 11:40

## 2017-10-18 NOTE — CONSULT NOTE ADULT - ASSESSMENT
Patient is 41 year old male with past medical history nephrolithiasis, obesity admitted on 10/17 for evaluation of kidney stones; patient underwent removal of stones on 10/17 and has been having fevers post op along with right sided pain. Of note about 4 weeks ago he developed acute flank pain and was seen in outside hospital found to have bilateral kidney stones and had ureteral stents placed. On day of admission passed small stone. Notes his fevers only developed after procedure done on day of admission. Currently with burkett catheter, and no other specific complaints.  1. Patient admitted with pyelonephritis and nephrolithiasis, secondary fever post op may be due to infection versus hematoma as seen on imaging  - follow up cultures   - iv hydration and supportive care   - serial cbc and monitor temperature   - agree with broad spectrum antibiotics with zosyn which will cover gram negative rods, anaerobes, enterococcus and pseudomonas  - urology evaluation in progress  - burkett catheter per urology  Will follow

## 2017-10-18 NOTE — PROVIDER CONTACT NOTE (OTHER) - RECOMMENDATIONS
Ice applied under arms and groin, blankets removed  will recheck temp at 2am
Cooling blanket
Marked bruised area will monitor closely.
Tylenol and reapply ice  will continue to monitor vs q2hrs

## 2017-10-18 NOTE — PROVIDER CONTACT NOTE (OTHER) - BACKGROUND
S/P Right Percutaneous nephrolithotomy 10/17  Code sepsis s/p Zosyn/Vanco, 3L fluid bolus, Tylenol, Torodol, Ice packs applied

## 2017-10-18 NOTE — PROVIDER CONTACT NOTE (OTHER) - ASSESSMENT
Pt is in considerable pain on right flank- hematoma bruising increased but has become more soft  VS as stated above

## 2017-10-18 NOTE — PROVIDER CONTACT NOTE (OTHER) - BACKGROUND
s/p Right percutaneous nephrolithotomy 10/17/17 with MD Root  s/p Zosyn and vanco is currently infusing  2/3 Liters of NS

## 2017-10-18 NOTE — PROGRESS NOTE ADULT - ASSESSMENT
42 yo M s/p RIGHT PCNL, POD#1, with fevers and flank pain, echymosis and swelling by operative site. Suspect irrigant from procedure has accumulated and is responsible for pain and fever.   - CT abd/pelvis to check for collection and residual stone burden  - EKG for tachycardia  - CXR to complete sepsis work up  - NPO for possible procedure pending CT results  - pain control IV toradol  - c/w Zosyn  - SCDs, incentive spirometer  -  ml/hr

## 2017-10-18 NOTE — PROVIDER CONTACT NOTE (OTHER) - SITUATION
Temp increased to 102.6 (Rectal) s/p Tylenol, Torodol, ice packs, 3L fluid bolus, Vanco/Zosyn mammogram

## 2017-10-18 NOTE — PROVIDER CONTACT NOTE (OTHER) - BACKGROUND
s/p R percutaneous nephrolithotomy on 10/17  was a code sepsis earlier, had 3L fluid bolus, IV abt vanco/zosyn, and appropriate labs drawn

## 2017-10-18 NOTE — PROGRESS NOTE ADULT - SUBJECTIVE AND OBJECTIVE BOX
UROLOGY PROGRESS NOTE    Pt seen and examined at bedside. Pt was tachycardic and febrile to 102 overnight. Maintained BP. Significant RIGHT flank pain, not controlled with narcotics but improved with toradol. Sepsis Code called last night due to persistent fever and tachycardic. Zosyn started, 3000 L bolus given. Labs showed lactate 1.4, and WBC 12.9, down from 15.   This am, Pt reports continued flank pain, improved with toradol but still significant. Tolerated clears overnight. No chest pain, no abd pain. mild nausea. Good urine output.     Vital Signs Last 24 Hrs  T(C): 38.9 (18 Oct 2017 06:01), Max: 39.4 (18 Oct 2017 01:05)  T(F): 102 (18 Oct 2017 06:01), Max: 102.9 (18 Oct 2017 01:05)  HR: 115 (18 Oct 2017 06:01) (59 - 124)  BP: 122/74 (18 Oct 2017 06:01) (107/76 - 144/92)  BP(mean): --  RR: 24 (18 Oct 2017 06:01) (12 - 24)  SpO2: 96% (18 Oct 2017 06:01) (94% - 100%)    NAD, A+Ox3  MMM, NCAT  EOMI  tachycardic, RR  no increased WOB  ABD SNT ND  RIGHT flank echymosis                          10.3   12.9  )-----------( 157      ( 18 Oct 2017 05:51 )             30.5     10-18    145  |  118<H>  |  15  ----------------------------<  115<H>  4.2   |  19<L>  |  1.42<H>    Ca    8.8      18 Oct 2017 05:51

## 2017-10-18 NOTE — CONSULT NOTE ADULT - SUBJECTIVE AND OBJECTIVE BOX
HPI:Patient is 41 year old male with past medical history nephrolithiasis, obesity admitted on 10/17 for evaluation of kidney stones; patient underwent removal of stones on 10/17 and has been having fevers post op along with right sided pain. Of note about 4 weeks ago he developed acute flank pain and was seen in outside hospital found to have bilateral kidney stones and had ureteral stents placed. On day of admission passed small stone. Notes his fevers only developed after procedure done on day of admission. Currently with burkett catheter, and no other specific complaints.      PMH: as above  PSH: as above  Meds: per reconcilation sheet, noted below  MEDICATIONS  (STANDING):  phenazopyridine 100 milliGRAM(s) Oral three times a day  piperacillin/tazobactam IVPB. 3.375 Gram(s) IV Intermittent every 8 hours  sodium chloride 0.9%. 1000 milliLiter(s) (125 mL/Hr) IV Continuous <Continuous>  tamsulosin 0.4 milliGRAM(s) Oral daily    MEDICATIONS  (PRN):  acetaminophen   Tablet 650 milliGRAM(s) Oral every 6 hours PRN For Temp greater than 38 C (100.4 F)  acetaminophen   Tablet. 650 milliGRAM(s) Oral every 6 hours PRN Mild Pain (1 - 3)  ketorolac   Injectable 30 milliGRAM(s) IV Push every 6 hours PRN Moderate Pain  morphine  - Injectable 2 milliGRAM(s) IV Push every 6 hours PRN Severe Pain  ondansetron Injectable 4 milliGRAM(s) IV Push every 6 hours PRN Nausea and/or Vomiting  oxybutynin 5 milliGRAM(s) Oral three times a day PRN Bladder spasms    Allergies    No Known Allergies    Intolerances      Social: positive smoking, no alcohol, no illegal drugs; no recent travel, no exposure to TB  FAMILY HISTORY:    ROS: the patient has  no chills, no HA, no dizziness, no sore throat, no blurry vision, no CP, no palpitations, no SOB, no cough,  no diarrhea, no N/V, no dysuria, no leg pain, no claudication, no rash, no joint aches, no rectal pain or bleeding, no night sweats  Vital Signs Last 24 Hrs  T(C): 38.1 (18 Oct 2017 09:35), Max: 39.4 (18 Oct 2017 01:05)  T(F): 100.5 (18 Oct 2017 09:35), Max: 102.9 (18 Oct 2017 01:05)  HR: 111 (18 Oct 2017 09:35) (59 - 124)  BP: 137/79 (18 Oct 2017 09:35) (111/67 - 144/92)  BP(mean): --  RR: 18 (18 Oct 2017 09:35) (12 - 24)  SpO2: 95% (18 Oct 2017 09:35) (94% - 100%)  Daily     Daily   Constitutional: well developed, well nourished  HEENT: NC/AT, EOMI, PERRLA  Neck: supple  Respiratory: clear, no r/r/w  Cardiovascular: S1S2 regular, no murmurs  Abdomen: soft, right sided tenderness not distended, positive BS  Genitourinary: deferred  Rectal: deferred  Musculoskeletal: no muscle tenderness, no joint swelling or tenderness  Neurological: AxOx3, moving all extremities, no focal deficits  Skin: no rashes                          10.3   12.9  )-----------( 157      ( 18 Oct 2017 05:51 )             30.5     10-18    145  |  118<H>  |  15  ----------------------------<  115<H>  4.2   |  19<L>  |  1.42<H>    Ca    8.8      18 Oct 2017 05:51         Urinalysis Basic - ( 17 Oct 2017 23:53 )    Color: Red / Appearance: very cloudy / S.010 / pH: x  Gluc: x / Ketone: Negative  / Bili: Negative / Urobili: Negative mg/dL   Blood: x / Protein: 100 mg/dL / Nitrite: Negative   Leuk Esterase: Moderate / RBC: >50 /HPF / WBC 11-25   Sq Epi: x / Non Sq Epi: Occasional / Bacteria: Few          Radiology:< from: CT Abdomen and Pelvis w/ IV Cont (10.18.17 @ 08:36) >    EXAM:  CT ABDOMEN AND PELVIS IC                            PROCEDURE DATE:  10/18/2017          INTERPRETATION:  CT ABDOMEN AND PELVIS IC    HISTORY:  Status post percutaneous nephrolithotomy, post op fever    Technique: CT of the abdomen and pelvis is performed without oral with   intravenous contrast. Axial images are supplemented with coronal and   sagittal reformations. This study was performed using automatic exposure   control (radiation dose reduction software) to obtain a diagnostic image   quality scan with patient dose as low as reasonably achievable.    Contrast: 90 cc Omnipaque 350    Comparison: CT abdomen and pelvis 2017    Findings:  LIVER: Normal.  SPLEEN: Normal.  PANCREAS: Cysts in the mid and distal body measuring 1.7 cm and 2.3 cm   (series 2 image 40, 36).  GALLBLADDER/BILIARY TREE: Nondilated. Normal gallbladder.  ADRENALS: Stable right adrenal adenoma.    KIDNEYS: Mild heterogeneous enhancement in the right kidney. Right   subcapsular hematoma has developed measuring 1.4 cm in thickness   containing droplets of gas. Moderate right perinephric stranding and   hemorrhage in the posterior perirenal space.     Increased mild right hydronephrosis and left collecting system fullness.   Bilateral nephroureteral stents in place. Interval fragmentation of right   renal pelvic calculus measuring 11 mm, previously 20 mm. At least 3   additional calculi at the right ureteropelvic junction and right upper   ureter measure approximately 15 mm each. No additional calculi in the   remainder of the mid to distal right ureter. There is a 4 mm calculus in   the distal left ureter that has slightly progressed distally.    LYMPHADENOPATHY/RETROPERITONEUM: No adenopathy.  VASCULATURE: Normal caliber aorta.  BOWEL: No bowel-related abnormality.  PELVIC VISCERA: Minimally enlarged prostate. Urinary bladder collapsed   around a Burkett catheter balloon.  PELVIC LYMPH NODES: No pelvic adenopathy.  PERITONEUM/ABDOMINAL WALL: Small ascites. No free air.  SKELETAL: No acutebony abnormality.  LUNG BASES: Small right pleural effusion and bibasilar atelectasis.    IMPRESSION:     Thin postprocedural right subcapsular hematoma measuring 1.4 cm in   thickness containing droplets of gas. Superimposed infection is not   excluded.    Small hemorrhage in the right posterior perirenal space and perinephric   stranding, also likely postprocedural. No localized perinephric fluid   collections.    Interval fragmentation of right renal pelvic calculus measuring 11 mm,   previously 20 mm. 3 additional 15 mm calculi at the right ureteropelvic   junction and right upper ureter.    Slight interval distal progression of a 4 mm left distal ureteral   calculus.    Slight increase in mild right hydronephrosis and left collecting system   fullness with bilateral nephroureteral stents in place.          < end of copied text >      Advanced directive addressed: full resuscitation

## 2017-10-18 NOTE — PROVIDER CONTACT NOTE (OTHER) - ASSESSMENT
Feeling a little better- still having some pain at right flank, site noted with increased bruising (remarked) and swelling but feels more soft than earlier in shift.    HR elevated 116, All other VSS

## 2017-10-18 NOTE — PROVIDER CONTACT NOTE (OTHER) - ACTION/TREATMENT ORDERED:
No new orders agree with plan of care
Give Torodol x1 now
Likely hematoma - MD does not believe it is dangerous- will monitor closely over night
No need for cooling blanket at this time.  Continue to monitor and give Tylenol when due again
agrees with above plan.  MD Root to f/u today

## 2017-10-19 ENCOUNTER — TRANSCRIPTION ENCOUNTER (OUTPATIENT)
Age: 42
End: 2017-10-19

## 2017-10-19 VITALS
HEART RATE: 107 BPM | DIASTOLIC BLOOD PRESSURE: 77 MMHG | RESPIRATION RATE: 17 BRPM | OXYGEN SATURATION: 98 % | SYSTOLIC BLOOD PRESSURE: 130 MMHG | TEMPERATURE: 100 F

## 2017-10-19 LAB
ANION GAP SERPL CALC-SCNC: 6 MMOL/L — SIGNIFICANT CHANGE UP (ref 5–17)
ANION GAP SERPL CALC-SCNC: 9 MMOL/L — SIGNIFICANT CHANGE UP (ref 5–17)
BUN SERPL-MCNC: 9 MG/DL — SIGNIFICANT CHANGE UP (ref 7–23)
BUN SERPL-MCNC: 9 MG/DL — SIGNIFICANT CHANGE UP (ref 7–23)
CALCIUM SERPL-MCNC: 10 MG/DL — SIGNIFICANT CHANGE UP (ref 8.5–10.1)
CALCIUM SERPL-MCNC: 9.8 MG/DL — SIGNIFICANT CHANGE UP (ref 8.5–10.1)
CHLORIDE SERPL-SCNC: 116 MMOL/L — HIGH (ref 96–108)
CHLORIDE SERPL-SCNC: 118 MMOL/L — HIGH (ref 96–108)
CO2 SERPL-SCNC: 19 MMOL/L — LOW (ref 22–31)
CO2 SERPL-SCNC: 21 MMOL/L — LOW (ref 22–31)
CREAT SERPL-MCNC: 1.26 MG/DL — SIGNIFICANT CHANGE UP (ref 0.5–1.3)
CREAT SERPL-MCNC: 1.3 MG/DL — SIGNIFICANT CHANGE UP (ref 0.5–1.3)
GLUCOSE SERPL-MCNC: 103 MG/DL — HIGH (ref 70–99)
GLUCOSE SERPL-MCNC: 139 MG/DL — HIGH (ref 70–99)
HCT VFR BLD CALC: 28.6 % — LOW (ref 39–50)
HCT VFR BLD CALC: 34 % — LOW (ref 39–50)
HGB BLD-MCNC: 10 G/DL — LOW (ref 13–17)
HGB BLD-MCNC: 11.4 G/DL — LOW (ref 13–17)
MCHC RBC-ENTMCNC: 30.1 PG — SIGNIFICANT CHANGE UP (ref 27–34)
MCHC RBC-ENTMCNC: 31 PG — SIGNIFICANT CHANGE UP (ref 27–34)
MCHC RBC-ENTMCNC: 33.6 GM/DL — SIGNIFICANT CHANGE UP (ref 32–36)
MCHC RBC-ENTMCNC: 35.1 GM/DL — SIGNIFICANT CHANGE UP (ref 32–36)
MCV RBC AUTO: 88.4 FL — SIGNIFICANT CHANGE UP (ref 80–100)
MCV RBC AUTO: 89.5 FL — SIGNIFICANT CHANGE UP (ref 80–100)
PLATELET # BLD AUTO: 156 K/UL — SIGNIFICANT CHANGE UP (ref 150–400)
PLATELET # BLD AUTO: 191 K/UL — SIGNIFICANT CHANGE UP (ref 150–400)
POTASSIUM SERPL-MCNC: 3.5 MMOL/L — SIGNIFICANT CHANGE UP (ref 3.5–5.3)
POTASSIUM SERPL-MCNC: 3.8 MMOL/L — SIGNIFICANT CHANGE UP (ref 3.5–5.3)
POTASSIUM SERPL-SCNC: 3.5 MMOL/L — SIGNIFICANT CHANGE UP (ref 3.5–5.3)
POTASSIUM SERPL-SCNC: 3.8 MMOL/L — SIGNIFICANT CHANGE UP (ref 3.5–5.3)
RBC # BLD: 3.23 M/UL — LOW (ref 4.2–5.8)
RBC # BLD: 3.8 M/UL — LOW (ref 4.2–5.8)
RBC # FLD: 11.7 % — SIGNIFICANT CHANGE UP (ref 10.3–14.5)
RBC # FLD: 12.4 % — SIGNIFICANT CHANGE UP (ref 10.3–14.5)
SODIUM SERPL-SCNC: 144 MMOL/L — SIGNIFICANT CHANGE UP (ref 135–145)
SODIUM SERPL-SCNC: 145 MMOL/L — SIGNIFICANT CHANGE UP (ref 135–145)
SURGICAL PATHOLOGY FINAL REPORT - CH: SIGNIFICANT CHANGE UP
WBC # BLD: 14 K/UL — HIGH (ref 3.8–10.5)
WBC # BLD: 17.1 K/UL — HIGH (ref 3.8–10.5)
WBC # FLD AUTO: 14 K/UL — HIGH (ref 3.8–10.5)
WBC # FLD AUTO: 17.1 K/UL — HIGH (ref 3.8–10.5)

## 2017-10-19 RX ORDER — KETOROLAC TROMETHAMINE 30 MG/ML
30 SYRINGE (ML) INJECTION EVERY 6 HOURS
Qty: 0 | Refills: 0 | Status: DISCONTINUED | OUTPATIENT
Start: 2017-10-19 | End: 2017-10-19

## 2017-10-19 RX ORDER — DOCUSATE SODIUM 100 MG
100 CAPSULE ORAL THREE TIMES A DAY
Qty: 0 | Refills: 0 | Status: DISCONTINUED | OUTPATIENT
Start: 2017-10-19 | End: 2017-10-19

## 2017-10-19 RX ORDER — OXYCODONE AND ACETAMINOPHEN 5; 325 MG/1; MG/1
1 TABLET ORAL EVERY 4 HOURS
Qty: 0 | Refills: 0 | Status: DISCONTINUED | OUTPATIENT
Start: 2017-10-19 | End: 2017-10-19

## 2017-10-19 RX ORDER — OXYCODONE AND ACETAMINOPHEN 5; 325 MG/1; MG/1
2 TABLET ORAL EVERY 4 HOURS
Qty: 0 | Refills: 0 | Status: DISCONTINUED | OUTPATIENT
Start: 2017-10-19 | End: 2017-10-19

## 2017-10-19 RX ORDER — PHENAZOPYRIDINE HCL 100 MG
1 TABLET ORAL
Qty: 6 | Refills: 0 | OUTPATIENT
Start: 2017-10-19 | End: 2017-10-21

## 2017-10-19 RX ORDER — DOCUSATE SODIUM 100 MG
1 CAPSULE ORAL
Qty: 90 | Refills: 3
Start: 2017-10-19 | End: 2018-02-15

## 2017-10-19 RX ORDER — TAMSULOSIN HYDROCHLORIDE 0.4 MG/1
1 CAPSULE ORAL
Qty: 30 | Refills: 3
Start: 2017-10-19 | End: 2018-02-15

## 2017-10-19 RX ADMIN — PIPERACILLIN AND TAZOBACTAM 25 GRAM(S): 4; .5 INJECTION, POWDER, LYOPHILIZED, FOR SOLUTION INTRAVENOUS at 05:45

## 2017-10-19 RX ADMIN — Medication 650 MILLIGRAM(S): at 18:15

## 2017-10-19 RX ADMIN — Medication 30 MILLIGRAM(S): at 05:59

## 2017-10-19 RX ADMIN — Medication 100 MILLIGRAM(S): at 13:24

## 2017-10-19 RX ADMIN — SODIUM CHLORIDE 125 MILLILITER(S): 9 INJECTION INTRAMUSCULAR; INTRAVENOUS; SUBCUTANEOUS at 01:06

## 2017-10-19 RX ADMIN — Medication 100 MILLIGRAM(S): at 16:52

## 2017-10-19 RX ADMIN — Medication 100 MILLIGRAM(S): at 05:45

## 2017-10-19 RX ADMIN — PIPERACILLIN AND TAZOBACTAM 25 GRAM(S): 4; .5 INJECTION, POWDER, LYOPHILIZED, FOR SOLUTION INTRAVENOUS at 13:24

## 2017-10-19 RX ADMIN — Medication 30 MILLIGRAM(S): at 05:44

## 2017-10-19 RX ADMIN — TAMSULOSIN HYDROCHLORIDE 0.4 MILLIGRAM(S): 0.4 CAPSULE ORAL at 11:28

## 2017-10-19 RX ADMIN — SODIUM CHLORIDE 125 MILLILITER(S): 9 INJECTION INTRAMUSCULAR; INTRAVENOUS; SUBCUTANEOUS at 05:44

## 2017-10-19 NOTE — DISCHARGE NOTE ADULT - HOSPITAL COURSE
Right nephrostomy placed 10/17/17 followed by RIGHT PCNL. Pt had significant post op pain and persistent fever to 102 and HR 120s. Pt started on Zofran and given 3000 mL bolus. Pain slowly improved, tolerated diet, fever downtrended.

## 2017-10-19 NOTE — PROGRESS NOTE ADULT - SUBJECTIVE AND OBJECTIVE BOX
Pt seen and examined at bedside. Pain significantly improved, controlled with PO meds. Fever overnight, but afebrile since 21:00. Pt subjectively feels much better. Ambulating, tolerating diet. Bowel function. Voiding freely.     Vital Signs Last 24 Hrs  T(C): 37.4 (19 Oct 2017 13:04), Max: 39 (18 Oct 2017 18:12)  T(F): 99.3 (19 Oct 2017 13:04), Max: 102.2 (18 Oct 2017 18:12)  HR: 108 (19 Oct 2017 13:04) (64 - 129)  BP: 131/61 (19 Oct 2017 13:04) (103/61 - 131/61)  BP(mean): --  RR: 17 (19 Oct 2017 13:04) (17 - 18)  SpO2: 99% (19 Oct 2017 13:04) (94% - 99%)    NAD, A+Ox3  EOMI  MMM, NCAT  RRR  no increased WOB  ABD S NT ND  bladder not palpable                          10.0   14.0  )-----------( 156      ( 19 Oct 2017 12:19 )             28.6     10-19    145  |  118<H>  |  9   ----------------------------<  103<H>  3.5   |  21<L>  |  1.30    Ca    9.8      19 Oct 2017 12:19

## 2017-10-19 NOTE — DISCHARGE NOTE ADULT - PATIENT PORTAL LINK FT
“You can access the [employer] Patient Portal, offered by [facility], by registering with the following website: [email or web address]”

## 2017-10-19 NOTE — DISCHARGE NOTE ADULT - CARE PROVIDER_API CALL
Travis Root  [address] [place] [address]  [address]  [place] [address]  Phone: [phone]  Fax: (   )    -

## 2017-10-19 NOTE — DISCHARGE NOTE ADULT - MEDICATION SUMMARY - MEDICATIONS TO TAKE
I will START or STAY ON the medications listed below when I get home from the hospital:    ibuprofen 600 mg oral tablet  -- 1 tab(s) by mouth every 6 hours   -- Do not take this drug if you are pregnant.  It is very important that you take or use this exactly as directed.  Do not skip doses or discontinue unless directed by your doctor.  May cause drowsiness or dizziness.  Obtain medical advice before taking any non-prescription drugs as some may affect the action of this medication.  Take with food or milk.    -- Indication: For as needed for pain    oxyCODONE-acetaminophen 5 mg-325 mg oral tablet  -- 1 tab(s) by mouth every 4 hours, As needed, Moderate Pain (4 - 6) MDD:6 tabs  -- Indication: For as needed for severe pain    tamsulosin 0.4 mg oral capsule  -- 1 cap(s) by mouth once a day  -- Indication: For For Stent discomfort    Zofran ODT 4 mg oral tablet, disintegrating  -- 1 tab(s) by mouth 3 times a day   -- Indication: For as needed for nausea    docusate sodium 100 mg oral capsule  -- 1 cap(s) by mouth 3 times a day  -- Indication: For stool softener, take while using narcotic pain medication    sulfamethoxazole-trimethoprim 800 mg-160 mg oral tablet  -- 1 tab(s) by mouth 2 times a day   -- Avoid prolonged or excessive exposure to direct and/or artificial sunlight while taking this medication.  Finish all this medication unless otherwise directed by prescriber.  Medication should be taken with plenty of water.    -- Indication: For Antibiotic    phenazopyridine 100 mg oral tablet  -- 1 tab(s) by mouth 3 times a day   -- Indication: For as needed for burning with urination

## 2017-10-19 NOTE — DISCHARGE NOTE ADULT - CARE PLAN
Principal Discharge DX:	Kidney stones  Goal:	remove stones, reduce pain  Instructions for follow-up, activity and diet:	call [phone] for follow up appointment with Dr Root

## 2017-10-19 NOTE — PROGRESS NOTE ADULT - ASSESSMENT
Patient is 41 year old male with past medical history nephrolithiasis, obesity admitted on 10/17 for evaluation of kidney stones; patient underwent removal of stones on 10/17 and has been having fevers post op along with right sided pain. Of note about 4 weeks ago he developed acute flank pain and was seen in outside hospital found to have bilateral kidney stones and had ureteral stents placed. On day of admission passed small stone. Notes his fevers only developed after procedure done on day of admission. Currently with burkett catheter, and no other specific complaints.  1. Patient admitted with pyelonephritis and nephrolithiasis, secondary fever post op may be due to infection versus hematoma as seen on imaging  - follow up cultures   - iv hydration and supportive care   - serial cbc and monitor temperature   - agree with broad spectrum antibiotics with zosyn which will cover gram negative rods, anaerobes, enterococcus and pseudomonas, day #2  - tolerating antibiotics without rashes or side effects   - patient educated about antibiotics and need to treat infection as well as potential side effects   - urology evaluation in progress  Will follow

## 2017-10-19 NOTE — PROGRESS NOTE ADULT - SUBJECTIVE AND OBJECTIVE BOX
HPI:Patient is 41 year old male with past medical history nephrolithiasis, obesity admitted on 10/17 for evaluation of kidney stones; patient underwent removal of stones on 10/17 and has been having fevers post op along with right sided pain. Of note about 4 weeks ago he developed acute flank pain and was seen in outside hospital found to have bilateral kidney stones and had ureteral stents placed. On day of admission passed small stone. Notes his fevers only developed after procedure done on day of admission. Currently with burkett catheter, and no other specific complaints.  Today 10/19 patient had burkett catheter removed, fever late yesterday    MEDICATIONS  (STANDING):  docusate sodium 100 milliGRAM(s) Oral three times a day  phenazopyridine 100 milliGRAM(s) Oral three times a day  piperacillin/tazobactam IVPB. 3.375 Gram(s) IV Intermittent every 8 hours  sodium chloride 0.9%. 1000 milliLiter(s) (125 mL/Hr) IV Continuous <Continuous>  tamsulosin 0.4 milliGRAM(s) Oral daily    MEDICATIONS  (PRN):  acetaminophen   Tablet 650 milliGRAM(s) Oral every 6 hours PRN For Temp greater than 38 C (100.4 F)  acetaminophen   Tablet. 650 milliGRAM(s) Oral every 6 hours PRN Mild Pain (1 - 3)  ketorolac   Injectable 30 milliGRAM(s) IV Push every 6 hours PRN Severe Pain (7 - 10)  ondansetron Injectable 4 milliGRAM(s) IV Push every 6 hours PRN Nausea and/or Vomiting  oxybutynin 5 milliGRAM(s) Oral three times a day PRN Bladder spasms  oxyCODONE    5 mG/acetaminophen 325 mG 1 Tablet(s) Oral every 4 hours PRN Moderate Pain (4 - 6)  oxyCODONE    5 mG/acetaminophen 325 mG 2 Tablet(s) Oral every 4 hours PRN Severe Pain (7 - 10)      Vital Signs Last 24 Hrs  T(C): 36.8 (19 Oct 2017 05:45), Max: 39 (18 Oct 2017 18:12)  T(F): 98.2 (19 Oct 2017 05:45), Max: 102.2 (18 Oct 2017 18:12)  HR: 117 (19 Oct 2017 05:45) (64 - 129)  BP: 124/73 (19 Oct 2017 05:45) (103/61 - 137/79)  BP(mean): --  RR: 18 (19 Oct 2017 05:45) (16 - 18)  SpO2: 95% (19 Oct 2017 05:45) (94% - 98%)    Physical Exam:          Daily     Daily   Constitutional: well developed, well nourished  HEENT: NC/AT, EOMI, PERRLA  Neck: supple  Respiratory: clear, no r/r/w  Cardiovascular: S1S2 regular, no murmurs  Abdomen: soft, right sided tenderness not distended, positive BS  Genitourinary: deferred  Rectal: deferred  Musculoskeletal: no muscle tenderness, no joint swelling or tenderness  Neurological: AxOx3, moving all extremities, no focal deficits  Skin: no rashes                          10.3   12.9  )-----------( 157      ( 18 Oct 2017 05:51 )             30.5     10-18    145  |  118<H>  |  15  ----------------------------<  115<H>  4.2   |  19<L>  |  1.42<H>    Ca    8.8      18 Oct 2017 05:51         Urinalysis Basic - ( 17 Oct 2017 23:53 )    Color: Red / Appearance: very cloudy / S.010 / pH: x  Gluc: x / Ketone: Negative  / Bili: Negative / Urobili: Negative mg/dL   Blood: x / Protein: 100 mg/dL / Nitrite: Negative   Leuk Esterase: Moderate / RBC: >50 /HPF / WBC 11-25   Sq Epi: x / Non Sq Epi: Occasional / Bacteria: Few          Radiology:< from: CT Abdomen and Pelvis w/ IV Cont (10.18.17 @ 08:36) >    EXAM:  CT ABDOMEN AND PELVIS IC                            PROCEDURE DATE:  10/18/2017          INTERPRETATION:  CT ABDOMEN AND PELVIS IC    HISTORY:  Status post percutaneous nephrolithotomy, post op fever    Technique: CT of the abdomen and pelvis is performed without oral with   intravenous contrast. Axial images are supplemented with coronal and   sagittal reformations. This study was performed using automatic exposure   control (radiation dose reduction software) to obtain a diagnostic image   quality scan with patient dose as low as reasonably achievable.    Contrast: 90 cc Omnipaque 350    Comparison: CT abdomen and pelvis 2017    Findings:  LIVER: Normal.  SPLEEN: Normal.  PANCREAS: Cysts in the mid and distal body measuring 1.7 cm and 2.3 cm   (series 2 image 40, 36).  GALLBLADDER/BILIARY TREE: Nondilated. Normal gallbladder.  ADRENALS: Stable right adrenal adenoma.    KIDNEYS: Mild heterogeneous enhancement in the right kidney. Right   subcapsular hematoma has developed measuring 1.4 cm in thickness   containing droplets of gas. Moderate right perinephric stranding and   hemorrhage in the posterior perirenal space.     Increased mild right hydronephrosis and left collecting system fullness.   Bilateral nephroureteral stents in place. Interval fragmentation of right   renal pelvic calculus measuring 11 mm, previously 20 mm. At least 3   additional calculi at the right ureteropelvic junction and right upper   ureter measure approximately 15 mm each. No additional calculi in the   remainder of the mid to distal right ureter. There is a 4 mm calculus in   the distal left ureter that has slightly progressed distally.    LYMPHADENOPATHY/RETROPERITONEUM: No adenopathy.  VASCULATURE: Normal caliber aorta.  BOWEL: No bowel-related abnormality.  PELVIC VISCERA: Minimally enlarged prostate. Urinary bladder collapsed   around a Burkett catheter balloon.  PELVIC LYMPH NODES: No pelvic adenopathy.  PERITONEUM/ABDOMINAL WALL: Small ascites. No free air.  SKELETAL: No acutebony abnormality.  LUNG BASES: Small right pleural effusion and bibasilar atelectasis.    IMPRESSION:     Thin postprocedural right subcapsular hematoma measuring 1.4 cm in   thickness containing droplets of gas. Superimposed infection is not   excluded.    Small hemorrhage in the right posterior perirenal space and perinephric   stranding, also likely postprocedural. No localized perinephric fluid   collections.    Interval fragmentation of right renal pelvic calculus measuring 11 mm,   previously 20 mm. 3 additional 15 mm calculi at the right ureteropelvic   junction and right upper ureter.    Slight interval distal progression of a 4 mm left distal ureteral   calculus.    Slight increase in mild right hydronephrosis and left collecting system   fullness with bilateral nephroureteral stents in place.          < end of copied text >      Advanced directive addressed: full resuscitation

## 2017-10-19 NOTE — PROGRESS NOTE ADULT - ASSESSMENT
40 yo M s/p RIGHT PCNL, course complicated with sepsis and severe pain, likely pyelovenous backflow with bacteremia, now improved. WBC and Cr downtrending. Pt will require multiple procedures in future to clear both kidneys, will discuss as outpatient  - plan for discharge, follow up next week in office

## 2017-10-19 NOTE — DISCHARGE NOTE ADULT - PROVIDER TOKENS
FREE:[LAST:[Ivett],FIRST:[Travis],PHONE:[phone]],FAX:[(   )    -],ADDRESS:[[address] [place] [address]]]

## 2017-10-21 LAB — COMPN STONE: SIGNIFICANT CHANGE UP

## 2017-10-23 LAB
CULTURE RESULTS: SIGNIFICANT CHANGE UP
SPECIMEN SOURCE: SIGNIFICANT CHANGE UP

## 2017-10-24 ENCOUNTER — APPOINTMENT (OUTPATIENT)
Dept: UROLOGY | Facility: CLINIC | Age: 42
End: 2017-10-24
Payer: MEDICAID

## 2017-10-24 VITALS
DIASTOLIC BLOOD PRESSURE: 86 MMHG | SYSTOLIC BLOOD PRESSURE: 130 MMHG | HEIGHT: 68 IN | BODY MASS INDEX: 37.13 KG/M2 | WEIGHT: 245 LBS

## 2017-10-24 DIAGNOSIS — N20.0 CALCULUS OF KIDNEY: ICD-10-CM

## 2017-10-24 DIAGNOSIS — E66.9 OBESITY, UNSPECIFIED: ICD-10-CM

## 2017-10-24 PROCEDURE — 99213 OFFICE O/P EST LOW 20 MIN: CPT

## 2017-11-01 DIAGNOSIS — R30.0 DYSURIA: ICD-10-CM

## 2017-11-08 DIAGNOSIS — N39.0 URINARY TRACT INFECTION, SITE NOT SPECIFIED: ICD-10-CM

## 2017-11-08 DIAGNOSIS — N12 TUBULO-INTERSTITIAL NEPHRITIS, NOT SPECIFIED AS ACUTE OR CHRONIC: ICD-10-CM

## 2017-11-08 DIAGNOSIS — Z87.442 PERSONAL HISTORY OF URINARY CALCULI: ICD-10-CM

## 2017-11-09 ENCOUNTER — APPOINTMENT (OUTPATIENT)
Dept: UROLOGY | Facility: HOSPITAL | Age: 42
End: 2017-11-09

## 2017-11-09 ENCOUNTER — MESSAGE (OUTPATIENT)
Age: 42
End: 2017-11-09

## 2017-11-10 DIAGNOSIS — R50.82 POSTPROCEDURAL FEVER: ICD-10-CM

## 2017-11-10 DIAGNOSIS — R78.81 BACTEREMIA: ICD-10-CM

## 2017-11-24 LAB
ANION GAP SERPL CALC-SCNC: 14 MMOL/L
APTT BLD: 30.8 SEC
BASOPHILS # BLD AUTO: 0.01 K/UL
BASOPHILS NFR BLD AUTO: 0.1 %
BUN SERPL-MCNC: 18 MG/DL
CALCIUM SERPL-MCNC: 11.3 MG/DL
CHLORIDE SERPL-SCNC: 110 MMOL/L
CO2 SERPL-SCNC: 22 MMOL/L
CREAT SERPL-MCNC: 1.26 MG/DL
EOSINOPHIL # BLD AUTO: 0.43 K/UL
EOSINOPHIL NFR BLD AUTO: 5 %
GLUCOSE SERPL-MCNC: 105 MG/DL
HCT VFR BLD CALC: 38.9 %
HGB BLD-MCNC: 12.1 G/DL
IMM GRANULOCYTES NFR BLD AUTO: 0.3 %
INR PPP: 1.06 RATIO
LYMPHOCYTES # BLD AUTO: 2.39 K/UL
LYMPHOCYTES NFR BLD AUTO: 27.6 %
MAN DIFF?: NORMAL
MCHC RBC-ENTMCNC: 27.8 PG
MCHC RBC-ENTMCNC: 31.1 GM/DL
MCV RBC AUTO: 89.4 FL
MONOCYTES # BLD AUTO: 0.81 K/UL
MONOCYTES NFR BLD AUTO: 9.3 %
NEUTROPHILS # BLD AUTO: 5 K/UL
NEUTROPHILS NFR BLD AUTO: 57.7 %
PLATELET # BLD AUTO: 260 K/UL
POTASSIUM SERPL-SCNC: 4 MMOL/L
PT BLD: 12 SEC
RBC # BLD: 4.35 M/UL
RBC # FLD: 14.8 %
SODIUM SERPL-SCNC: 146 MMOL/L
WBC # FLD AUTO: 8.67 K/UL

## 2017-11-28 ENCOUNTER — FORM ENCOUNTER (OUTPATIENT)
Age: 42
End: 2017-11-28

## 2017-11-28 RX ORDER — FENTANYL CITRATE 50 UG/ML
50 INJECTION INTRAVENOUS
Qty: 0 | Refills: 0 | Status: DISCONTINUED | OUTPATIENT
Start: 2017-11-29 | End: 2017-11-29

## 2017-11-28 RX ORDER — OXYCODONE HYDROCHLORIDE 5 MG/1
10 TABLET ORAL EVERY 6 HOURS
Qty: 0 | Refills: 0 | Status: DISCONTINUED | OUTPATIENT
Start: 2017-11-29 | End: 2017-11-29

## 2017-11-29 ENCOUNTER — APPOINTMENT (OUTPATIENT)
Dept: UROLOGY | Facility: HOSPITAL | Age: 42
End: 2017-11-29
Payer: MEDICAID

## 2017-11-29 ENCOUNTER — OUTPATIENT (OUTPATIENT)
Dept: OUTPATIENT SERVICES | Facility: HOSPITAL | Age: 42
LOS: 1 days | Discharge: HOME | End: 2017-11-29

## 2017-11-29 ENCOUNTER — OTHER (OUTPATIENT)
Age: 42
End: 2017-11-29

## 2017-11-29 VITALS
HEART RATE: 90 BPM | DIASTOLIC BLOOD PRESSURE: 93 MMHG | TEMPERATURE: 98 F | OXYGEN SATURATION: 100 % | RESPIRATION RATE: 17 BRPM | SYSTOLIC BLOOD PRESSURE: 132 MMHG

## 2017-11-29 VITALS
OXYGEN SATURATION: 98 % | TEMPERATURE: 98 F | HEIGHT: 68 IN | WEIGHT: 240.08 LBS | DIASTOLIC BLOOD PRESSURE: 96 MMHG | SYSTOLIC BLOOD PRESSURE: 127 MMHG | RESPIRATION RATE: 16 BRPM | HEART RATE: 80 BPM

## 2017-11-29 DIAGNOSIS — E66.01 MORBID (SEVERE) OBESITY DUE TO EXCESS CALORIES: ICD-10-CM

## 2017-11-29 DIAGNOSIS — E78.5 HYPERLIPIDEMIA, UNSPECIFIED: ICD-10-CM

## 2017-11-29 DIAGNOSIS — F17.210 NICOTINE DEPENDENCE, CIGARETTES, UNCOMPLICATED: ICD-10-CM

## 2017-11-29 DIAGNOSIS — N20.0 CALCULUS OF KIDNEY: ICD-10-CM

## 2017-11-29 DIAGNOSIS — N20.1 CALCULUS OF URETER: ICD-10-CM

## 2017-11-29 DIAGNOSIS — Z98.890 OTHER SPECIFIED POSTPROCEDURAL STATES: Chronic | ICD-10-CM

## 2017-11-29 PROCEDURE — 52356 CYSTO/URETERO W/LITHOTRIPSY: CPT | Mod: 50,58

## 2017-11-29 RX ORDER — OXYCODONE HYDROCHLORIDE 5 MG/1
5 TABLET ORAL EVERY 4 HOURS
Qty: 0 | Refills: 0 | Status: DISCONTINUED | OUTPATIENT
Start: 2017-11-29 | End: 2017-11-29

## 2017-11-29 RX ORDER — PHENAZOPYRIDINE HCL 100 MG
200 TABLET ORAL ONCE
Qty: 0 | Refills: 0 | Status: COMPLETED | OUTPATIENT
Start: 2017-11-29 | End: 2017-11-29

## 2017-11-29 RX ORDER — SODIUM CHLORIDE 9 MG/ML
1000 INJECTION, SOLUTION INTRAVENOUS
Qty: 0 | Refills: 0 | Status: DISCONTINUED | OUTPATIENT
Start: 2017-11-29 | End: 2017-11-29

## 2017-11-29 RX ORDER — OXYBUTYNIN CHLORIDE 5 MG
1 TABLET ORAL
Qty: 90 | Refills: 5
Start: 2017-11-29 | End: 2018-05-27

## 2017-11-29 RX ORDER — ATROPA BELLADONNA AND OPIUM 16.2; 6 MG/1; MG/1
1 SUPPOSITORY RECTAL ONCE
Qty: 0 | Refills: 0 | Status: DISCONTINUED | OUTPATIENT
Start: 2017-11-29 | End: 2017-11-29

## 2017-11-29 RX ORDER — MOXIFLOXACIN HYDROCHLORIDE TABLETS, 400 MG 400 MG/1
1 TABLET, FILM COATED ORAL
Qty: 20 | Refills: 0
Start: 2017-11-29 | End: 2017-12-09

## 2017-11-29 RX ORDER — KETOROLAC TROMETHAMINE 30 MG/ML
30 SYRINGE (ML) INJECTION ONCE
Qty: 0 | Refills: 0 | Status: DISCONTINUED | OUTPATIENT
Start: 2017-11-29 | End: 2017-11-29

## 2017-11-29 RX ORDER — ONDANSETRON 8 MG/1
4 TABLET, FILM COATED ORAL ONCE
Qty: 0 | Refills: 0 | Status: DISCONTINUED | OUTPATIENT
Start: 2017-11-29 | End: 2017-11-29

## 2017-11-29 RX ADMIN — FENTANYL CITRATE 50 MICROGRAM(S): 50 INJECTION INTRAVENOUS at 19:29

## 2017-11-29 RX ADMIN — ATROPA BELLADONNA AND OPIUM 1 SUPPOSITORY(S): 16.2; 6 SUPPOSITORY RECTAL at 19:21

## 2017-11-29 RX ADMIN — Medication 30 MILLIGRAM(S): at 19:54

## 2017-11-29 RX ADMIN — FENTANYL CITRATE 50 MICROGRAM(S): 50 INJECTION INTRAVENOUS at 19:21

## 2017-11-29 RX ADMIN — OXYCODONE HYDROCHLORIDE 10 MILLIGRAM(S): 5 TABLET ORAL at 20:21

## 2017-11-29 RX ADMIN — ATROPA BELLADONNA AND OPIUM 1 SUPPOSITORY(S): 16.2; 6 SUPPOSITORY RECTAL at 19:53

## 2017-11-29 RX ADMIN — FENTANYL CITRATE 50 MICROGRAM(S): 50 INJECTION INTRAVENOUS at 19:53

## 2017-11-29 RX ADMIN — Medication 30 MILLIGRAM(S): at 20:21

## 2017-11-29 RX ADMIN — Medication 200 MILLIGRAM(S): at 19:21

## 2017-11-29 RX ADMIN — OXYCODONE HYDROCHLORIDE 10 MILLIGRAM(S): 5 TABLET ORAL at 21:15

## 2017-11-29 NOTE — ASU DISCHARGE PLAN (ADULT/PEDIATRIC). - MEDICATION SUMMARY - MEDICATIONS TO TAKE
I will START or STAY ON the medications listed below when I get home from the hospital:    ibuprofen 600 mg oral tablet  -- 1 tab(s) by mouth every 6 hours   -- Do not take this drug if you are pregnant.  It is very important that you take or use this exactly as directed.  Do not skip doses or discontinue unless directed by your doctor.  May cause drowsiness or dizziness.  Obtain medical advice before taking any non-prescription drugs as some may affect the action of this medication.  Take with food or milk.    -- Indication: For as needed for mild to moderate pain    oxyCODONE-acetaminophen 5 mg-325 mg oral tablet  -- 1 tab(s) by mouth every 4 hours, As needed, Moderate Pain (4 - 6) MDD:6 tabs  -- Indication: For as needed for severe pain    phenazopyridine 100 mg oral tablet  -- 1 tab(s) by mouth 3 times a day   -- Indication: For as needed for burning with urination    Cipro 500 mg oral tablet  -- 1 tab(s) by mouth every 12 hours   -- Avoid prolonged or excessive exposure to direct and/or artificial sunlight while taking this medication.  Check with your doctor before becoming pregnant.  Do not take dairy products, antacids, or iron preparations within one hour of this medication.  Finish all this medication unless otherwise directed by prescriber.  Medication should be taken with plenty of water.    -- Indication: For antibiotic     oxybutynin 5 mg oral tablet  -- 1 tab(s) by mouth 3 times a day, As Needed -for bladder spasms   -- May cause drowsiness.  Alcohol may intensify this effect.  Use care when operating dangerous machinery.    -- Indication: For as needed for bladder spasm or urinary urgency

## 2017-11-29 NOTE — BRIEF OPERATIVE NOTE - OPERATION/FINDINGS
small left kidney stones, lithotripted to small fragments. Plan for for simple right ureteral stent exchange, however right ureteral stent was completely encrusted with stone. RIGHT ureteroscopy with lithotripsy along entire length of stent was required. Stent removed successfully.

## 2017-11-29 NOTE — BRIEF OPERATIVE NOTE - PROCEDURE
<<-----Click on this checkbox to enter Procedure Bilateral ureteroscopy with laser lithotripsy  11/29/2017  cystoscopy, bilateral ureteroscopy, laser lithotripsy, bilateral ureteral stent exchange  Active  ALENA

## 2017-12-05 ENCOUNTER — FORM ENCOUNTER (OUTPATIENT)
Age: 42
End: 2017-12-05

## 2017-12-05 RX ORDER — SODIUM CHLORIDE 9 MG/ML
1000 INJECTION, SOLUTION INTRAVENOUS
Qty: 0 | Refills: 0 | Status: DISCONTINUED | OUTPATIENT
Start: 2017-12-06 | End: 2017-12-08

## 2017-12-05 RX ORDER — FENTANYL CITRATE 50 UG/ML
50 INJECTION INTRAVENOUS
Qty: 0 | Refills: 0 | Status: DISCONTINUED | OUTPATIENT
Start: 2017-12-06 | End: 2017-12-08

## 2017-12-05 RX ORDER — OXYCODONE HYDROCHLORIDE 5 MG/1
5 TABLET ORAL EVERY 4 HOURS
Qty: 0 | Refills: 0 | Status: DISCONTINUED | OUTPATIENT
Start: 2017-12-06 | End: 2017-12-08

## 2017-12-05 RX ORDER — ONDANSETRON 8 MG/1
4 TABLET, FILM COATED ORAL ONCE
Qty: 0 | Refills: 0 | Status: DISCONTINUED | OUTPATIENT
Start: 2017-12-06 | End: 2017-12-08

## 2017-12-05 RX ORDER — OXYCODONE HYDROCHLORIDE 5 MG/1
10 TABLET ORAL EVERY 6 HOURS
Qty: 0 | Refills: 0 | Status: DISCONTINUED | OUTPATIENT
Start: 2017-12-06 | End: 2017-12-08

## 2017-12-06 ENCOUNTER — OTHER (OUTPATIENT)
Age: 42
End: 2017-12-06

## 2017-12-06 ENCOUNTER — OUTPATIENT (OUTPATIENT)
Dept: OUTPATIENT SERVICES | Facility: HOSPITAL | Age: 42
LOS: 1 days | Discharge: HOME | End: 2017-12-06

## 2017-12-06 ENCOUNTER — APPOINTMENT (OUTPATIENT)
Dept: UROLOGY | Facility: HOSPITAL | Age: 42
End: 2017-12-06
Payer: MEDICAID

## 2017-12-06 VITALS
OXYGEN SATURATION: 99 % | SYSTOLIC BLOOD PRESSURE: 120 MMHG | HEART RATE: 70 BPM | WEIGHT: 240.52 LBS | DIASTOLIC BLOOD PRESSURE: 79 MMHG | TEMPERATURE: 98 F | RESPIRATION RATE: 16 BRPM

## 2017-12-06 VITALS
OXYGEN SATURATION: 99 % | TEMPERATURE: 98 F | HEART RATE: 86 BPM | SYSTOLIC BLOOD PRESSURE: 119 MMHG | DIASTOLIC BLOOD PRESSURE: 83 MMHG | RESPIRATION RATE: 16 BRPM

## 2017-12-06 DIAGNOSIS — Z98.890 OTHER SPECIFIED POSTPROCEDURAL STATES: Chronic | ICD-10-CM

## 2017-12-06 LAB
ANION GAP SERPL CALC-SCNC: 5 MMOL/L — SIGNIFICANT CHANGE UP (ref 5–17)
BUN SERPL-MCNC: 12 MG/DL — SIGNIFICANT CHANGE UP (ref 7–23)
CALCIUM SERPL-MCNC: 10.5 MG/DL — HIGH (ref 8.5–10.1)
CALCIUM SERPL-MCNC: 10.8 MG/DL — HIGH (ref 8.4–10.5)
CHLORIDE SERPL-SCNC: 113 MMOL/L — HIGH (ref 96–108)
CO2 SERPL-SCNC: 25 MMOL/L — SIGNIFICANT CHANGE UP (ref 22–31)
CREAT SERPL-MCNC: 1.2 MG/DL — SIGNIFICANT CHANGE UP (ref 0.5–1.3)
GLUCOSE SERPL-MCNC: 106 MG/DL — HIGH (ref 70–99)
POTASSIUM SERPL-MCNC: 4.1 MMOL/L — SIGNIFICANT CHANGE UP (ref 3.5–5.3)
POTASSIUM SERPL-SCNC: 4.1 MMOL/L — SIGNIFICANT CHANGE UP (ref 3.5–5.3)
PTH-INTACT FLD-MCNC: 147 PG/ML — HIGH (ref 15–65)
SODIUM SERPL-SCNC: 143 MMOL/L — SIGNIFICANT CHANGE UP (ref 135–145)

## 2017-12-06 PROCEDURE — 52356 CYSTO/URETERO W/LITHOTRIPSY: CPT | Mod: 58,RT

## 2017-12-06 RX ORDER — ONDANSETRON 8 MG/1
4 TABLET, FILM COATED ORAL EVERY 6 HOURS
Qty: 0 | Refills: 0 | Status: DISCONTINUED | OUTPATIENT
Start: 2017-12-06 | End: 2017-12-21

## 2017-12-06 RX ORDER — IBUPROFEN 200 MG
400 TABLET ORAL EVERY 8 HOURS
Qty: 0 | Refills: 0 | Status: DISCONTINUED | OUTPATIENT
Start: 2017-12-06 | End: 2017-12-21

## 2017-12-06 RX ORDER — KETOROLAC TROMETHAMINE 30 MG/ML
30 SYRINGE (ML) INJECTION ONCE
Qty: 0 | Refills: 0 | Status: DISCONTINUED | OUTPATIENT
Start: 2017-12-06 | End: 2017-12-06

## 2017-12-06 RX ORDER — HYDROMORPHONE HYDROCHLORIDE 2 MG/ML
1 INJECTION INTRAMUSCULAR; INTRAVENOUS; SUBCUTANEOUS EVERY 4 HOURS
Qty: 0 | Refills: 0 | Status: DISCONTINUED | OUTPATIENT
Start: 2017-12-06 | End: 2017-12-06

## 2017-12-06 RX ORDER — PHENAZOPYRIDINE HCL 100 MG
200 TABLET ORAL ONCE
Qty: 0 | Refills: 0 | Status: COMPLETED | OUTPATIENT
Start: 2017-12-06 | End: 2017-12-06

## 2017-12-06 RX ORDER — PHENAZOPYRIDINE HCL 100 MG
1 TABLET ORAL
Qty: 6 | Refills: 0 | OUTPATIENT
Start: 2017-12-06 | End: 2017-12-08

## 2017-12-06 RX ADMIN — Medication 200 MILLIGRAM(S): at 14:59

## 2017-12-06 NOTE — BRIEF OPERATIVE NOTE - OPERATION/FINDINGS
LEFT ureteral stent removed without difficulty. Right ureter with significant stone burden requiring basket extraction to pass ureteroscope. 2 large stones seen in midpole and 2 large stone in renal pelvis on fluoroscopy, placement confirmed with retrograde pyelogram. 2 renal pelvis stones fragmented with laser lithotripsy.

## 2017-12-06 NOTE — ASU DISCHARGE PLAN (ADULT/PEDIATRIC). - MEDICATION SUMMARY - MEDICATIONS TO TAKE
I will START or STAY ON the medications listed below when I get home from the hospital:    ibuprofen 600 mg oral tablet  -- 1 tab(s) by mouth every 6 hours, As Needed  -- Do not take this drug if you are pregnant.  It is very important that you take or use this exactly as directed.  Do not skip doses or discontinue unless directed by your doctor.  May cause drowsiness or dizziness.  Obtain medical advice before taking any non-prescription drugs as some may affect the action of this medication.  Take with food or milk.    -- Indication: For as needed for mild to moderate pain    oxyCODONE-acetaminophen 5 mg-325 mg oral tablet  -- 1 tab(s) by mouth every 4 hours, As needed, Moderate Pain (4 - 6) MDD:6 tabs  -- Indication: For as needed for severe pain    phenazopyridine 100 mg oral tablet  -- 1 tab(s) by mouth 3 times a day   -- Indication: For as needed for burning with urination    oxybutynin 5 mg oral tablet  -- 1 tab(s) by mouth 3 times a day, As Needed -for bladder spasms   -- May cause drowsiness.  Alcohol may intensify this effect.  Use care when operating dangerous machinery.    -- Indication: For as needed for urinary urgency or bladder spasm

## 2017-12-06 NOTE — BRIEF OPERATIVE NOTE - PROCEDURE
<<-----Click on this checkbox to enter Procedure Cystoscopy and ureteroscopy, with laser lithotripsy  12/06/2017  Cystoscopy, right ureteroscopy, laser lithotripsy, stone extraction, right ureteral stent exchange, right retrograde pyelogram, LEFT ureteral stent removal  Active  [id number]

## 2017-12-15 DIAGNOSIS — F17.210 NICOTINE DEPENDENCE, CIGARETTES, UNCOMPLICATED: ICD-10-CM

## 2017-12-15 DIAGNOSIS — N20.0 CALCULUS OF KIDNEY: ICD-10-CM

## 2017-12-15 DIAGNOSIS — N20.1 CALCULUS OF URETER: ICD-10-CM

## 2017-12-15 DIAGNOSIS — E66.9 OBESITY, UNSPECIFIED: ICD-10-CM

## 2018-02-23 ENCOUNTER — APPOINTMENT (OUTPATIENT)
Dept: ULTRASOUND IMAGING | Facility: CLINIC | Age: 43
End: 2018-02-23
Payer: MEDICAID

## 2018-02-23 ENCOUNTER — OUTPATIENT (OUTPATIENT)
Dept: OUTPATIENT SERVICES | Facility: HOSPITAL | Age: 43
LOS: 1 days | End: 2018-02-23
Payer: MEDICAID

## 2018-02-23 ENCOUNTER — APPOINTMENT (OUTPATIENT)
Dept: RADIOLOGY | Facility: CLINIC | Age: 43
End: 2018-02-23
Payer: MEDICAID

## 2018-02-23 DIAGNOSIS — N20.0 CALCULUS OF KIDNEY: ICD-10-CM

## 2018-02-23 DIAGNOSIS — Z98.890 OTHER SPECIFIED POSTPROCEDURAL STATES: Chronic | ICD-10-CM

## 2018-02-23 PROCEDURE — 74018 RADEX ABDOMEN 1 VIEW: CPT

## 2018-02-23 PROCEDURE — 76775 US EXAM ABDO BACK WALL LIM: CPT | Mod: 26

## 2018-02-23 PROCEDURE — 74018 RADEX ABDOMEN 1 VIEW: CPT | Mod: 26

## 2018-02-23 PROCEDURE — 76775 US EXAM ABDO BACK WALL LIM: CPT

## 2018-04-16 ENCOUNTER — APPOINTMENT (OUTPATIENT)
Dept: UROLOGY | Facility: CLINIC | Age: 43
End: 2018-04-16
Payer: MEDICAID

## 2018-04-16 VITALS — SYSTOLIC BLOOD PRESSURE: 151 MMHG | DIASTOLIC BLOOD PRESSURE: 104 MMHG | OXYGEN SATURATION: 97 % | HEART RATE: 62 BPM

## 2018-04-16 PROCEDURE — 52000 CYSTOURETHROSCOPY: CPT

## 2018-04-16 RX ORDER — OXYCODONE AND ACETAMINOPHEN 5; 325 MG/1; MG/1
5-325 TABLET ORAL
Qty: 42 | Refills: 0 | Status: COMPLETED | COMMUNITY
Start: 2017-12-06

## 2018-04-16 RX ORDER — CIPROFLOXACIN HYDROCHLORIDE 500 MG/1
500 TABLET, FILM COATED ORAL
Qty: 20 | Refills: 0 | Status: COMPLETED | COMMUNITY
Start: 2017-11-29

## 2018-04-16 RX ORDER — SULFAMETHOXAZOLE AND TRIMETHOPRIM 800; 160 MG/1; MG/1
800-160 TABLET ORAL
Qty: 20 | Refills: 0 | Status: COMPLETED | COMMUNITY
Start: 2017-10-19

## 2018-04-16 RX ORDER — OXYBUTYNIN CHLORIDE 5 MG/1
5 TABLET ORAL
Qty: 90 | Refills: 0 | Status: COMPLETED | COMMUNITY
Start: 2017-11-29

## 2018-05-17 LAB
ANION GAP SERPL CALC-SCNC: 8 MMOL/L
APPEARANCE: ABNORMAL
APTT BLD: 29.1 SEC
BACTERIA: ABNORMAL
BASOPHILS # BLD AUTO: 0.02 K/UL
BASOPHILS NFR BLD AUTO: 0.2 %
BILIRUBIN URINE: NEGATIVE
BLOOD URINE: ABNORMAL
BUN SERPL-MCNC: 16 MG/DL
CALCIUM SERPL-MCNC: 11.6 MG/DL
CHLORIDE SERPL-SCNC: 112 MMOL/L
CO2 SERPL-SCNC: 26 MMOL/L
COLOR: YELLOW
CREAT SERPL-MCNC: 1.19 MG/DL
EOSINOPHIL # BLD AUTO: 0.68 K/UL
EOSINOPHIL NFR BLD AUTO: 7.6 %
GLUCOSE QUALITATIVE U: NEGATIVE MG/DL
GLUCOSE SERPL-MCNC: 96 MG/DL
HCT VFR BLD CALC: 43.5 %
HGB BLD-MCNC: 14.3 G/DL
HYALINE CASTS: 5 /LPF
IMM GRANULOCYTES NFR BLD AUTO: 0.6 %
INR PPP: 0.91 RATIO
KETONES URINE: NEGATIVE
LEUKOCYTE ESTERASE URINE: ABNORMAL
LYMPHOCYTES # BLD AUTO: 2.42 K/UL
LYMPHOCYTES NFR BLD AUTO: 26.9 %
MAN DIFF?: NORMAL
MCHC RBC-ENTMCNC: 28.9 PG
MCHC RBC-ENTMCNC: 32.9 GM/DL
MCV RBC AUTO: 88.1 FL
MICROSCOPIC-UA: NORMAL
MONOCYTES # BLD AUTO: 0.99 K/UL
MONOCYTES NFR BLD AUTO: 11 %
NEUTROPHILS # BLD AUTO: 4.82 K/UL
NEUTROPHILS NFR BLD AUTO: 53.7 %
NITRITE URINE: POSITIVE
PH URINE: 8
PLATELET # BLD AUTO: 211 K/UL
POTASSIUM SERPL-SCNC: 3.8 MMOL/L
PROTEIN URINE: 100 MG/DL
PT BLD: 10.3 SEC
RBC # BLD: 4.94 M/UL
RBC # FLD: 17.4 %
RED BLOOD CELLS URINE: 225 /HPF
SODIUM SERPL-SCNC: 146 MMOL/L
SPECIFIC GRAVITY URINE: 1.01
SQUAMOUS EPITHELIAL CELLS: 0 /HPF
UROBILINOGEN URINE: NEGATIVE MG/DL
WBC # FLD AUTO: 8.98 K/UL
WHITE BLOOD CELLS URINE: >720 /HPF

## 2018-05-18 ENCOUNTER — OUTPATIENT (OUTPATIENT)
Dept: OUTPATIENT SERVICES | Facility: HOSPITAL | Age: 43
LOS: 1 days | End: 2018-05-18

## 2018-05-18 ENCOUNTER — APPOINTMENT (OUTPATIENT)
Dept: CT IMAGING | Facility: CLINIC | Age: 43
End: 2018-05-18
Payer: MEDICAID

## 2018-05-18 DIAGNOSIS — Z98.890 OTHER SPECIFIED POSTPROCEDURAL STATES: Chronic | ICD-10-CM

## 2018-05-18 PROCEDURE — 74176 CT ABD & PELVIS W/O CONTRAST: CPT | Mod: 26

## 2018-05-22 LAB — BACTERIA UR CULT: ABNORMAL

## 2018-05-25 ENCOUNTER — OTHER (OUTPATIENT)
Age: 43
End: 2018-05-25

## 2018-05-25 ENCOUNTER — APPOINTMENT (OUTPATIENT)
Dept: UROLOGY | Facility: HOSPITAL | Age: 43
End: 2018-05-25

## 2018-06-01 ENCOUNTER — APPOINTMENT (OUTPATIENT)
Dept: UROLOGY | Facility: CLINIC | Age: 43
End: 2018-06-01

## 2018-06-19 ENCOUNTER — APPOINTMENT (OUTPATIENT)
Dept: UROLOGY | Facility: CLINIC | Age: 43
End: 2018-06-19
Payer: MEDICAID

## 2018-06-19 PROCEDURE — 99214 OFFICE O/P EST MOD 30 MIN: CPT

## 2018-06-22 LAB — BACTERIA UR CULT: NORMAL

## 2018-07-16 ENCOUNTER — OUTPATIENT (OUTPATIENT)
Dept: OUTPATIENT SERVICES | Facility: HOSPITAL | Age: 43
LOS: 1 days | End: 2018-07-16
Payer: MEDICAID

## 2018-07-16 VITALS
OXYGEN SATURATION: 98 % | HEIGHT: 68 IN | DIASTOLIC BLOOD PRESSURE: 97 MMHG | TEMPERATURE: 98 F | WEIGHT: 238.1 LBS | HEART RATE: 93 BPM | SYSTOLIC BLOOD PRESSURE: 137 MMHG | RESPIRATION RATE: 18 BRPM

## 2018-07-16 DIAGNOSIS — N20.0 CALCULUS OF KIDNEY: ICD-10-CM

## 2018-07-16 DIAGNOSIS — Z98.890 OTHER SPECIFIED POSTPROCEDURAL STATES: Chronic | ICD-10-CM

## 2018-07-16 DIAGNOSIS — Z01.818 ENCOUNTER FOR OTHER PREPROCEDURAL EXAMINATION: ICD-10-CM

## 2018-07-16 LAB
ANION GAP SERPL CALC-SCNC: 11 MMOL/L — SIGNIFICANT CHANGE UP (ref 5–17)
BLD GP AB SCN SERPL QL: NEGATIVE — SIGNIFICANT CHANGE UP
BUN SERPL-MCNC: 18 MG/DL — SIGNIFICANT CHANGE UP (ref 7–23)
CALCIUM SERPL-MCNC: 12.1 MG/DL — HIGH (ref 8.4–10.5)
CHLORIDE SERPL-SCNC: 110 MMOL/L — HIGH (ref 96–108)
CO2 SERPL-SCNC: 20 MMOL/L — LOW (ref 22–31)
CREAT SERPL-MCNC: 1.16 MG/DL — SIGNIFICANT CHANGE UP (ref 0.5–1.3)
GLUCOSE SERPL-MCNC: 102 MG/DL — HIGH (ref 70–99)
HCT VFR BLD CALC: 45.4 % — SIGNIFICANT CHANGE UP (ref 39–50)
HGB BLD-MCNC: 15.4 G/DL — SIGNIFICANT CHANGE UP (ref 13–17)
MCHC RBC-ENTMCNC: 29.9 PG — SIGNIFICANT CHANGE UP (ref 27–34)
MCHC RBC-ENTMCNC: 33.9 GM/DL — SIGNIFICANT CHANGE UP (ref 32–36)
MCV RBC AUTO: 88.2 FL — SIGNIFICANT CHANGE UP (ref 80–100)
PLATELET # BLD AUTO: 210 K/UL — SIGNIFICANT CHANGE UP (ref 150–400)
POTASSIUM SERPL-MCNC: 4.2 MMOL/L — SIGNIFICANT CHANGE UP (ref 3.5–5.3)
POTASSIUM SERPL-SCNC: 4.2 MMOL/L — SIGNIFICANT CHANGE UP (ref 3.5–5.3)
RBC # BLD: 5.15 M/UL — SIGNIFICANT CHANGE UP (ref 4.2–5.8)
RBC # FLD: 17.5 % — HIGH (ref 10.3–14.5)
RH IG SCN BLD-IMP: POSITIVE — SIGNIFICANT CHANGE UP
SODIUM SERPL-SCNC: 141 MMOL/L — SIGNIFICANT CHANGE UP (ref 135–145)
WBC # BLD: 7.74 K/UL — SIGNIFICANT CHANGE UP (ref 3.8–10.5)
WBC # FLD AUTO: 7.74 K/UL — SIGNIFICANT CHANGE UP (ref 3.8–10.5)

## 2018-07-16 RX ORDER — CEFAZOLIN SODIUM 1 G
2000 VIAL (EA) INJECTION ONCE
Qty: 0 | Refills: 0 | Status: COMPLETED | OUTPATIENT
Start: 2018-07-23 | End: 2018-07-23

## 2018-07-16 NOTE — H&P PST ADULT - HISTORY OF PRESENT ILLNESS
41 y/o M PMH renal calculi, S/P right nephrostolithotomy and bilateral ureteral stent placement in October, S/P cystoscopy and lithotripsy with removal of left ureteral stent in December, right ureteral stent remains intact.  Presents today for cystoscopy, right retrograde pyelogram, right percutaneous nephrostolithotomy, removal of retained stent, possible right ureteroscopy. 43 y/o M PMH renal calculi, S/P right nephrostolithotomy and bilateral ureteral stent placement in October, S/P cystoscopy and lithotripsy with removal of left ureteral stent in December, right ureteral stent remains in place, retained and unable to remove via retrograde approach.  Presents today for cystoscopy, right retrograde pyelogram, right percutaneous nephrostolithotomy, removal of retained stent, possible right ureteroscopy.

## 2018-07-16 NOTE — H&P PST ADULT - PROBLEM SELECTOR PLAN 1
Cystoscopy, right retrograde pyelogram, right percutaneous nephrostolithotomy, removal of retained stent, possible right ureteroscopy

## 2018-07-17 LAB
CULTURE RESULTS: SIGNIFICANT CHANGE UP
SPECIMEN SOURCE: SIGNIFICANT CHANGE UP

## 2018-07-22 ENCOUNTER — TRANSCRIPTION ENCOUNTER (OUTPATIENT)
Age: 43
End: 2018-07-22

## 2018-07-23 ENCOUNTER — RESULT REVIEW (OUTPATIENT)
Age: 43
End: 2018-07-23

## 2018-07-23 ENCOUNTER — OUTPATIENT (OUTPATIENT)
Dept: OUTPATIENT SERVICES | Facility: HOSPITAL | Age: 43
LOS: 1 days | End: 2018-07-23
Payer: MEDICAID

## 2018-07-23 ENCOUNTER — APPOINTMENT (OUTPATIENT)
Dept: UROLOGY | Facility: HOSPITAL | Age: 43
End: 2018-07-23

## 2018-07-23 VITALS
DIASTOLIC BLOOD PRESSURE: 83 MMHG | HEIGHT: 68 IN | SYSTOLIC BLOOD PRESSURE: 120 MMHG | OXYGEN SATURATION: 97 % | WEIGHT: 238.1 LBS | RESPIRATION RATE: 18 BRPM | HEART RATE: 86 BPM | TEMPERATURE: 98 F

## 2018-07-23 DIAGNOSIS — N20.0 CALCULUS OF KIDNEY: ICD-10-CM

## 2018-07-23 DIAGNOSIS — Z98.890 OTHER SPECIFIED POSTPROCEDURAL STATES: Chronic | ICD-10-CM

## 2018-07-23 LAB
ANION GAP SERPL CALC-SCNC: 11 MMOL/L — SIGNIFICANT CHANGE UP (ref 5–17)
BASOPHILS # BLD AUTO: 0 K/UL — SIGNIFICANT CHANGE UP (ref 0–0.2)
BASOPHILS NFR BLD AUTO: 0.2 % — SIGNIFICANT CHANGE UP (ref 0–2)
BUN SERPL-MCNC: 15 MG/DL — SIGNIFICANT CHANGE UP (ref 7–23)
CALCIUM SERPL-MCNC: 11.1 MG/DL — HIGH (ref 8.4–10.5)
CHLORIDE SERPL-SCNC: 110 MMOL/L — HIGH (ref 96–108)
CO2 SERPL-SCNC: 21 MMOL/L — LOW (ref 22–31)
CREAT SERPL-MCNC: 1.3 MG/DL — SIGNIFICANT CHANGE UP (ref 0.5–1.3)
EOSINOPHIL # BLD AUTO: 0.4 K/UL — SIGNIFICANT CHANGE UP (ref 0–0.5)
EOSINOPHIL NFR BLD AUTO: 2.8 % — SIGNIFICANT CHANGE UP (ref 0–6)
GLUCOSE SERPL-MCNC: 132 MG/DL — HIGH (ref 70–99)
HCT VFR BLD CALC: 44.1 % — SIGNIFICANT CHANGE UP (ref 39–50)
HGB BLD-MCNC: 14.6 G/DL — SIGNIFICANT CHANGE UP (ref 13–17)
LYMPHOCYTES # BLD AUTO: 28.1 % — SIGNIFICANT CHANGE UP (ref 13–44)
LYMPHOCYTES # BLD AUTO: 3.8 K/UL — HIGH (ref 1–3.3)
MCHC RBC-ENTMCNC: 29.9 PG — SIGNIFICANT CHANGE UP (ref 27–34)
MCHC RBC-ENTMCNC: 33 GM/DL — SIGNIFICANT CHANGE UP (ref 32–36)
MCV RBC AUTO: 90.4 FL — SIGNIFICANT CHANGE UP (ref 80–100)
MONOCYTES # BLD AUTO: 0.8 K/UL — SIGNIFICANT CHANGE UP (ref 0–0.9)
MONOCYTES NFR BLD AUTO: 6.2 % — SIGNIFICANT CHANGE UP (ref 2–14)
NEUTROPHILS # BLD AUTO: 8.4 K/UL — HIGH (ref 1.8–7.4)
NEUTROPHILS NFR BLD AUTO: 62.7 % — SIGNIFICANT CHANGE UP (ref 43–77)
PLATELET # BLD AUTO: 193 K/UL — SIGNIFICANT CHANGE UP (ref 150–400)
POTASSIUM SERPL-MCNC: 4.2 MMOL/L — SIGNIFICANT CHANGE UP (ref 3.5–5.3)
POTASSIUM SERPL-SCNC: 4.2 MMOL/L — SIGNIFICANT CHANGE UP (ref 3.5–5.3)
RBC # BLD: 4.88 M/UL — SIGNIFICANT CHANGE UP (ref 4.2–5.8)
RBC # FLD: 15.2 % — HIGH (ref 10.3–14.5)
RH IG SCN BLD-IMP: POSITIVE — SIGNIFICANT CHANGE UP
SODIUM SERPL-SCNC: 142 MMOL/L — SIGNIFICANT CHANGE UP (ref 135–145)
WBC # BLD: 13.4 K/UL — HIGH (ref 3.8–10.5)
WBC # FLD AUTO: 13.4 K/UL — HIGH (ref 3.8–10.5)

## 2018-07-23 PROCEDURE — 52332 CYSTOSCOPY AND TREATMENT: CPT | Mod: RT

## 2018-07-23 PROCEDURE — 50081 PERQ NL/PL LITHOTRP CPLX>2CM: CPT | Mod: RT

## 2018-07-23 PROCEDURE — 88300 SURGICAL PATH GROSS: CPT | Mod: 26

## 2018-07-23 PROCEDURE — 86850 RBC ANTIBODY SCREEN: CPT

## 2018-07-23 PROCEDURE — 87086 URINE CULTURE/COLONY COUNT: CPT

## 2018-07-23 PROCEDURE — 86900 BLOOD TYPING SEROLOGIC ABO: CPT

## 2018-07-23 PROCEDURE — 85027 COMPLETE CBC AUTOMATED: CPT

## 2018-07-23 PROCEDURE — G0463: CPT

## 2018-07-23 PROCEDURE — 50395: CPT | Mod: 59,RT

## 2018-07-23 PROCEDURE — 50430 NJX PX NFROSGRM &/URTRGRM: CPT | Mod: 59,RT

## 2018-07-23 PROCEDURE — 86901 BLOOD TYPING SEROLOGIC RH(D): CPT

## 2018-07-23 PROCEDURE — 76000 FLUOROSCOPY <1 HR PHYS/QHP: CPT

## 2018-07-23 PROCEDURE — 52352 CYSTOURETERO W/STONE REMOVE: CPT | Mod: 59,RT

## 2018-07-23 PROCEDURE — 80048 BASIC METABOLIC PNL TOTAL CA: CPT

## 2018-07-23 RX ORDER — OXYCODONE AND ACETAMINOPHEN 5; 325 MG/1; MG/1
1 TABLET ORAL EVERY 4 HOURS
Qty: 0 | Refills: 0 | Status: DISCONTINUED | OUTPATIENT
Start: 2018-07-23 | End: 2018-07-23

## 2018-07-23 RX ORDER — HYDROMORPHONE HYDROCHLORIDE 2 MG/ML
0.5 INJECTION INTRAMUSCULAR; INTRAVENOUS; SUBCUTANEOUS
Qty: 0 | Refills: 0 | Status: DISCONTINUED | OUTPATIENT
Start: 2018-07-23 | End: 2018-07-24

## 2018-07-23 RX ORDER — OXYCODONE AND ACETAMINOPHEN 5; 325 MG/1; MG/1
2 TABLET ORAL EVERY 6 HOURS
Qty: 0 | Refills: 0 | Status: DISCONTINUED | OUTPATIENT
Start: 2018-07-23 | End: 2018-07-23

## 2018-07-23 RX ORDER — ONDANSETRON 8 MG/1
4 TABLET, FILM COATED ORAL ONCE
Qty: 0 | Refills: 0 | Status: DISCONTINUED | OUTPATIENT
Start: 2018-07-23 | End: 2018-07-24

## 2018-07-23 RX ORDER — SODIUM CHLORIDE 9 MG/ML
1000 INJECTION, SOLUTION INTRAVENOUS
Qty: 0 | Refills: 0 | Status: DISCONTINUED | OUTPATIENT
Start: 2018-07-23 | End: 2018-08-07

## 2018-07-23 RX ORDER — SENNA PLUS 8.6 MG/1
2 TABLET ORAL AT BEDTIME
Qty: 0 | Refills: 0 | Status: DISCONTINUED | OUTPATIENT
Start: 2018-07-23 | End: 2018-08-07

## 2018-07-23 RX ORDER — SODIUM CHLORIDE 9 MG/ML
3 INJECTION INTRAMUSCULAR; INTRAVENOUS; SUBCUTANEOUS EVERY 8 HOURS
Qty: 0 | Refills: 0 | Status: DISCONTINUED | OUTPATIENT
Start: 2018-07-23 | End: 2018-07-23

## 2018-07-23 RX ORDER — HEPARIN SODIUM 5000 [USP'U]/ML
5000 INJECTION INTRAVENOUS; SUBCUTANEOUS EVERY 8 HOURS
Qty: 0 | Refills: 0 | Status: DISCONTINUED | OUTPATIENT
Start: 2018-07-23 | End: 2018-08-07

## 2018-07-23 RX ORDER — LIDOCAINE HCL 20 MG/ML
0.2 VIAL (ML) INJECTION ONCE
Qty: 0 | Refills: 0 | Status: DISCONTINUED | OUTPATIENT
Start: 2018-07-23 | End: 2018-07-23

## 2018-07-23 RX ORDER — HYDROMORPHONE HYDROCHLORIDE 2 MG/ML
0.5 INJECTION INTRAMUSCULAR; INTRAVENOUS; SUBCUTANEOUS EVERY 4 HOURS
Qty: 0 | Refills: 0 | Status: DISCONTINUED | OUTPATIENT
Start: 2018-07-23 | End: 2018-07-23

## 2018-07-23 RX ADMIN — HYDROMORPHONE HYDROCHLORIDE 0.5 MILLIGRAM(S): 2 INJECTION INTRAMUSCULAR; INTRAVENOUS; SUBCUTANEOUS at 23:11

## 2018-07-23 RX ADMIN — HYDROMORPHONE HYDROCHLORIDE 0.5 MILLIGRAM(S): 2 INJECTION INTRAMUSCULAR; INTRAVENOUS; SUBCUTANEOUS at 21:45

## 2018-07-23 RX ADMIN — HYDROMORPHONE HYDROCHLORIDE 0.5 MILLIGRAM(S): 2 INJECTION INTRAMUSCULAR; INTRAVENOUS; SUBCUTANEOUS at 22:56

## 2018-07-23 RX ADMIN — HEPARIN SODIUM 5000 UNIT(S): 5000 INJECTION INTRAVENOUS; SUBCUTANEOUS at 22:30

## 2018-07-23 RX ADMIN — HYDROMORPHONE HYDROCHLORIDE 0.5 MILLIGRAM(S): 2 INJECTION INTRAMUSCULAR; INTRAVENOUS; SUBCUTANEOUS at 22:00

## 2018-07-23 NOTE — BRIEF OPERATIVE NOTE - PROCEDURE
<<-----Click on this checkbox to enter Procedure Percutaneous nephrolithotomy  07/23/2018  removal of retained right ureteral stent, antegrade nephrostogram, right ureteral stent placement, right antegrade ureteroscopy  Active  RORO  Cystoscopy  07/23/2018  retrograde pyelogram  Active  RORO

## 2018-07-24 ENCOUNTER — TRANSCRIPTION ENCOUNTER (OUTPATIENT)
Age: 43
End: 2018-07-24

## 2018-07-24 VITALS
DIASTOLIC BLOOD PRESSURE: 73 MMHG | HEART RATE: 97 BPM | SYSTOLIC BLOOD PRESSURE: 117 MMHG | OXYGEN SATURATION: 100 % | TEMPERATURE: 98 F | RESPIRATION RATE: 14 BRPM

## 2018-07-24 LAB
ANION GAP SERPL CALC-SCNC: 10 MMOL/L — SIGNIFICANT CHANGE UP (ref 5–17)
BASOPHILS # BLD AUTO: 0 K/UL — SIGNIFICANT CHANGE UP (ref 0–0.2)
BASOPHILS NFR BLD AUTO: 0 % — SIGNIFICANT CHANGE UP (ref 0–2)
BUN SERPL-MCNC: 15 MG/DL — SIGNIFICANT CHANGE UP (ref 7–23)
CALCIUM SERPL-MCNC: 10.3 MG/DL — SIGNIFICANT CHANGE UP (ref 8.4–10.5)
CHLORIDE SERPL-SCNC: 109 MMOL/L — HIGH (ref 96–108)
CO2 SERPL-SCNC: 21 MMOL/L — LOW (ref 22–31)
CREAT SERPL-MCNC: 1.29 MG/DL — SIGNIFICANT CHANGE UP (ref 0.5–1.3)
EOSINOPHIL # BLD AUTO: 0.1 K/UL — SIGNIFICANT CHANGE UP (ref 0–0.5)
EOSINOPHIL NFR BLD AUTO: 0.4 % — SIGNIFICANT CHANGE UP (ref 0–6)
GLUCOSE SERPL-MCNC: 127 MG/DL — HIGH (ref 70–99)
HCT VFR BLD CALC: 42.4 % — SIGNIFICANT CHANGE UP (ref 39–50)
HGB BLD-MCNC: 13.9 G/DL — SIGNIFICANT CHANGE UP (ref 13–17)
LYMPHOCYTES # BLD AUTO: 1.8 K/UL — SIGNIFICANT CHANGE UP (ref 1–3.3)
LYMPHOCYTES # BLD AUTO: 13 % — SIGNIFICANT CHANGE UP (ref 13–44)
MCHC RBC-ENTMCNC: 29.5 PG — SIGNIFICANT CHANGE UP (ref 27–34)
MCHC RBC-ENTMCNC: 32.9 GM/DL — SIGNIFICANT CHANGE UP (ref 32–36)
MCV RBC AUTO: 89.7 FL — SIGNIFICANT CHANGE UP (ref 80–100)
MONOCYTES # BLD AUTO: 1.1 K/UL — HIGH (ref 0–0.9)
MONOCYTES NFR BLD AUTO: 7.8 % — SIGNIFICANT CHANGE UP (ref 2–14)
NEUTROPHILS # BLD AUTO: 10.9 K/UL — HIGH (ref 1.8–7.4)
NEUTROPHILS NFR BLD AUTO: 78.8 % — HIGH (ref 43–77)
PLATELET # BLD AUTO: 197 K/UL — SIGNIFICANT CHANGE UP (ref 150–400)
POTASSIUM SERPL-MCNC: 4.4 MMOL/L — SIGNIFICANT CHANGE UP (ref 3.5–5.3)
POTASSIUM SERPL-SCNC: 4.4 MMOL/L — SIGNIFICANT CHANGE UP (ref 3.5–5.3)
RBC # BLD: 4.73 M/UL — SIGNIFICANT CHANGE UP (ref 4.2–5.8)
RBC # FLD: 15.1 % — HIGH (ref 10.3–14.5)
SODIUM SERPL-SCNC: 140 MMOL/L — SIGNIFICANT CHANGE UP (ref 135–145)
WBC # BLD: 13.8 K/UL — HIGH (ref 3.8–10.5)
WBC # FLD AUTO: 13.8 K/UL — HIGH (ref 3.8–10.5)

## 2018-07-24 PROCEDURE — C1758: CPT

## 2018-07-24 PROCEDURE — 88300 SURGICAL PATH GROSS: CPT

## 2018-07-24 PROCEDURE — C1887: CPT

## 2018-07-24 PROCEDURE — C1769: CPT

## 2018-07-24 PROCEDURE — C1726: CPT

## 2018-07-24 PROCEDURE — C2617: CPT

## 2018-07-24 PROCEDURE — 86900 BLOOD TYPING SEROLOGIC ABO: CPT

## 2018-07-24 PROCEDURE — 80048 BASIC METABOLIC PNL TOTAL CA: CPT

## 2018-07-24 PROCEDURE — 50080 PERQ NL/PL LITHOTRP SMPL<2CM: CPT | Mod: RT

## 2018-07-24 PROCEDURE — 52332 CYSTOSCOPY AND TREATMENT: CPT | Mod: RT

## 2018-07-24 PROCEDURE — 82365 CALCULUS SPECTROSCOPY: CPT

## 2018-07-24 PROCEDURE — C1889: CPT

## 2018-07-24 PROCEDURE — 86901 BLOOD TYPING SEROLOGIC RH(D): CPT

## 2018-07-24 PROCEDURE — 85027 COMPLETE CBC AUTOMATED: CPT

## 2018-07-24 RX ORDER — ACETAMINOPHEN 500 MG
1000 TABLET ORAL ONCE
Qty: 0 | Refills: 0 | Status: COMPLETED | OUTPATIENT
Start: 2018-07-24 | End: 2018-07-24

## 2018-07-24 RX ORDER — CEPHALEXIN 500 MG
1 CAPSULE ORAL
Qty: 9 | Refills: 0 | OUTPATIENT
Start: 2018-07-24 | End: 2018-07-26

## 2018-07-24 RX ORDER — TAMSULOSIN HYDROCHLORIDE 0.4 MG/1
1 CAPSULE ORAL
Qty: 30 | Refills: 0
Start: 2018-07-24 | End: 2018-08-22

## 2018-07-24 RX ORDER — KETOROLAC TROMETHAMINE 30 MG/ML
15 SYRINGE (ML) INJECTION ONCE
Qty: 0 | Refills: 0 | Status: DISCONTINUED | OUTPATIENT
Start: 2018-07-24 | End: 2018-07-24

## 2018-07-24 RX ORDER — SENNA PLUS 8.6 MG/1
2 TABLET ORAL
Qty: 0 | Refills: 0 | DISCHARGE
Start: 2018-07-24

## 2018-07-24 RX ORDER — CEPHALEXIN 500 MG
1 CAPSULE ORAL
Qty: 9 | Refills: 0
Start: 2018-07-24 | End: 2018-07-26

## 2018-07-24 RX ADMIN — HYDROMORPHONE HYDROCHLORIDE 0.5 MILLIGRAM(S): 2 INJECTION INTRAMUSCULAR; INTRAVENOUS; SUBCUTANEOUS at 01:00

## 2018-07-24 RX ADMIN — Medication 15 MILLIGRAM(S): at 09:50

## 2018-07-24 RX ADMIN — Medication 1000 MILLIGRAM(S): at 03:37

## 2018-07-24 RX ADMIN — Medication 400 MILLIGRAM(S): at 03:01

## 2018-07-24 RX ADMIN — Medication 15 MILLIGRAM(S): at 08:51

## 2018-07-24 RX ADMIN — HYDROMORPHONE HYDROCHLORIDE 0.5 MILLIGRAM(S): 2 INJECTION INTRAMUSCULAR; INTRAVENOUS; SUBCUTANEOUS at 00:45

## 2018-07-24 RX ADMIN — SODIUM CHLORIDE 125 MILLILITER(S): 9 INJECTION, SOLUTION INTRAVENOUS at 06:15

## 2018-07-24 RX ADMIN — SODIUM CHLORIDE 125 MILLILITER(S): 9 INJECTION, SOLUTION INTRAVENOUS at 00:19

## 2018-07-24 NOTE — DISCHARGE NOTE ADULT - HOSPITAL COURSE
41yo male admitted s/p scheduled R PCNL ante URS, R stent exchange, post op did well. tubeless in back and stent on string. passed tov on POD#1. AVSS, hemodynamically stable. home with 3 days keflex

## 2018-07-24 NOTE — DISCHARGE NOTE ADULT - CARE PLAN
Principal Discharge DX:	Kidney stones  Goal:	pain control  Assessment and plan of treatment:	Call the office if you have fever greater than 101, difficulty urinating, pain not relieved with pain medication, nausea/vomiting. You may have intermittent pink tinged urine and slight flank pain when you urinate.  This is normal and due to the stent in your ureter.   If your urine becomes bright red or with clots, please call the office. recommend colace/senna while taking narcotic pain medication to avoid constipation. Do not drive while taking narcotic pain medication

## 2018-07-24 NOTE — DISCHARGE NOTE ADULT - PATIENT PORTAL LINK FT
You can access the [facility], offered by [facility], by registering with the following website: [email or web address]

## 2018-07-24 NOTE — PROGRESS NOTE ADULT - SUBJECTIVE AND OBJECTIVE BOX
UROLOGY DAILY PROGRESS NOTE:     Subjective: Patient seen and examined at bedside. No overnight events.  Pain controlled.       Objective:  Vital signs  T(F): , Max: 98.2 (07-23-18 @ 15:35)  HR: 66 (07-24-18 @ 04:00)  BP: 112/87 (07-24-18 @ 04:00)  SpO2: 100% (07-24-18 @ 04:00)  Wt(kg): --    I&O's Summary    23 Jul 2018 07:01  -  24 Jul 2018 06:55  --------------------------------------------------------  IN: 1350 mL / OUT: 625 mL / NET: 725 mL    I&O's Detail    23 Jul 2018 07:01  -  24 Jul 2018 06:56  --------------------------------------------------------  IN:    IV PiggyBack: 100 mL    lactated ringers.: 1250 mL  Total IN: 1350 mL    OUT:    Indwelling Catheter - Urethral: 625 mL  Total OUT: 625 mL    Total NET: 725 mL          Gen: NAD  Pulm: No respiratory distress, no subcostal retractions  CV: RRR, no JVD  Abd: Soft, NT, ND  Back: No CVAT, dressing CDI  : Garcia-   clear urine     Labs:  07-24  13.8  / 42.4  /1.29   07-23  13.4  / 44.1  /1.30                           13.9   13.8  )-----------( 197      ( 24 Jul 2018 04:30 )             42.4     07-24    140  |  109<H>  |  15  ----------------------------<  127<H>  4.4   |  21<L>  |  1.29    Ca    10.3      24 Jul 2018 04:30      Urine Cx:  Culture - Urine (07.16.18 @ 16:30)    Specimen Source: .Urine Clean Catch (Midstream)    Culture Results:   Normal Urogenital ventura present

## 2018-07-24 NOTE — DISCHARGE NOTE ADULT - PLAN OF CARE
pain control Call the office if you have fever greater than 101, difficulty urinating, pain not relieved with pain medication, nausea/vomiting. You may have intermittent pink tinged urine and slight flank pain when you urinate.  This is normal and due to the stent in your ureter.   If your urine becomes bright red or with clots, please call the office. recommend colace/senna while taking narcotic pain medication to avoid constipation. Do not drive while taking narcotic pain medication

## 2018-07-24 NOTE — PROGRESS NOTE ADULT - ATTENDING COMMENTS
Pt seen and examined- complains of surgical site flank pain.  PE shows mildly tender back around incision.  No ecchymoses, no abdominal fullness or mass.  resp wnl  urine clearing earlier- awaiting TOV now.  AM labs excellent  Agree with plan- can use toradol for additional pain control  d/c planning today with f/u for cysto and stent removal in ~ 1-2 weeks

## 2018-07-24 NOTE — PROGRESS NOTE ADULT - ASSESSMENT
42 year old male s/p right PCNL, antegrade nephrostogram, antegrade URS, R stent exchange    -analgesia as needed  -monitor I's and O's  -AM labs  -AM TOV  -OOB/DVT prophylaxis  -incentive spirometry

## 2018-07-24 NOTE — DISCHARGE NOTE ADULT - CARE PROVIDERS DIRECT ADDRESSES
[email or web address]

## 2018-07-24 NOTE — PROGRESS NOTE ADULT - SUBJECTIVE AND OBJECTIVE BOX
The patient was seen and examined at bedside.  Currently experiencing acute RLQ pain. Denies complaints of chest pain, shortness of breath, nausea.    T(C): 36.5 (07-23-18 @ 21:30), Max: 36.8 (07-23-18 @ 15:35)  HR: 66 (07-23-18 @ 23:30) (60 - 86)  BP: 138/93 (07-23-18 @ 23:30) (118/86 - 148/97)  RR: 11 (07-23-18 @ 23:30) (11 - 19)  SpO2: 100% (07-23-18 @ 23:30) (97% - 100%)  Wt(kg): --    Physical Exam:    General: NAD, A+Ox3  Abdomen: soft, +RLQ tenderness, non-distended  Back: no hematoma, dressing clean/dry/intact      07-23 @ 07:01  -  07-24 @ 00:06  --------------------------------------------------------  IN: 375 mL / OUT: 200 mL / NET: 175 mL    Garcia - clear output                          14.6   13.4  )-----------( 193      ( 23 Jul 2018 22:21 )             44.1       142  |  110<H>  |  15  ----------------------------<  132<H>  4.2   |  21<L>  |  1.30    Ca    11.1<H>      23 Jul 2018 22:21

## 2018-07-24 NOTE — DISCHARGE NOTE ADULT - CARE PROVIDER_API CALL
Hoenig, David M (MD), Urology  [facility] Dept of Urology  [address] [place] [address]  Phone: [phone]  Fax: [phone]

## 2018-07-24 NOTE — DISCHARGE NOTE ADULT - MEDICATION SUMMARY - MEDICATIONS TO TAKE
I will START or STAY ON the medications listed below when I get home from the hospital:    ibuprofen 600 mg oral tablet  -- 1 tab(s) by mouth every 6 hours, As Needed  -- Do not take this drug if you are pregnant.  It is very important that you take or use this exactly as directed.  Do not skip doses or discontinue unless directed by your doctor.  May cause drowsiness or dizziness.  Obtain medical advice before taking any non-prescription drugs as some may affect the action of this medication.  Take with food or milk.    -- Indication: For pain    oxyCODONE-acetaminophen 5 mg-325 mg oral tablet  -- 1 tab(s) by mouth every 4 hours, As needed, Moderate Pain (4 - 6) MDD:6 tabs  -- Indication: For pain    Keflex 500 mg oral capsule  -- 1 cap(s) by mouth 3 times a day   -- Finish all this medication unless otherwise directed by prescriber.    -- Indication: For antibiotic    senna oral tablet  -- 2 tab(s) by mouth once a day (at bedtime), As needed, Constipation  -- Indication: For Constipation     phenazopyridine 100 mg oral tablet  -- 1 tab(s) by mouth 3 times a day   -- Indication: For dysuria     oxybutynin 5 mg oral tablet  -- 1 tab(s) by mouth 3 times a day, As Needed -for bladder spasms   -- May cause drowsiness.  Alcohol may intensify this effect.  Use care when operating dangerous machinery.    -- Indication: For bladder spasms

## 2018-07-27 PROBLEM — N20.0 CALCULUS OF KIDNEY: Chronic | Status: ACTIVE | Noted: 2017-09-28

## 2018-07-28 LAB — COMPN STONE: SIGNIFICANT CHANGE UP

## 2018-07-30 LAB — COMPN STONE: SIGNIFICANT CHANGE UP

## 2018-07-31 ENCOUNTER — APPOINTMENT (OUTPATIENT)
Dept: UROLOGY | Facility: CLINIC | Age: 43
End: 2018-07-31
Payer: MEDICAID

## 2018-07-31 VITALS — SYSTOLIC BLOOD PRESSURE: 127 MMHG | DIASTOLIC BLOOD PRESSURE: 92 MMHG | OXYGEN SATURATION: 97 % | HEART RATE: 101 BPM

## 2018-07-31 PROCEDURE — 52310 CYSTOSCOPY AND TREATMENT: CPT | Mod: 58

## 2018-07-31 PROCEDURE — 99214 OFFICE O/P EST MOD 30 MIN: CPT | Mod: 25,24

## 2018-07-31 RX ORDER — OXYCODONE 5 MG/1
5 TABLET ORAL
Qty: 60 | Refills: 0 | Status: COMPLETED | COMMUNITY
Start: 2017-11-01 | End: 2018-07-31

## 2018-07-31 RX ORDER — TAMSULOSIN HYDROCHLORIDE 0.4 MG/1
0.4 CAPSULE ORAL
Qty: 30 | Refills: 0 | Status: COMPLETED | COMMUNITY
Start: 2017-10-19 | End: 2018-07-31

## 2018-07-31 RX ORDER — OXYCODONE 5 MG/1
5 TABLET ORAL
Qty: 30 | Refills: 0 | Status: COMPLETED | COMMUNITY
Start: 2017-12-01 | End: 2018-07-31

## 2018-07-31 RX ORDER — PHENAZOPYRIDINE HYDROCHLORIDE 200 MG/1
200 TABLET ORAL 3 TIMES DAILY
Qty: 9 | Refills: 0 | Status: COMPLETED | COMMUNITY
Start: 2018-04-16 | End: 2018-07-31

## 2018-07-31 RX ORDER — PHENAZOPYRIDINE HYDROCHLORIDE 100 MG/1
100 TABLET ORAL 3 TIMES DAILY
Qty: 90 | Refills: 0 | Status: COMPLETED | COMMUNITY
Start: 2017-11-01 | End: 2018-07-31

## 2018-08-03 LAB
SURGICAL PATHOLOGY STUDY: SIGNIFICANT CHANGE UP
SURGICAL PATHOLOGY STUDY: SIGNIFICANT CHANGE UP

## 2018-10-30 ENCOUNTER — FORM ENCOUNTER (OUTPATIENT)
Age: 43
End: 2018-10-30

## 2018-10-31 ENCOUNTER — OUTPATIENT (OUTPATIENT)
Dept: OUTPATIENT SERVICES | Facility: HOSPITAL | Age: 43
LOS: 1 days | End: 2018-10-31
Payer: MEDICAID

## 2018-10-31 ENCOUNTER — APPOINTMENT (OUTPATIENT)
Dept: ULTRASOUND IMAGING | Facility: IMAGING CENTER | Age: 43
End: 2018-10-31
Payer: MEDICAID

## 2018-10-31 ENCOUNTER — APPOINTMENT (OUTPATIENT)
Dept: UROLOGY | Facility: CLINIC | Age: 43
End: 2018-10-31
Payer: MEDICAID

## 2018-10-31 DIAGNOSIS — Z98.890 OTHER SPECIFIED POSTPROCEDURAL STATES: Chronic | ICD-10-CM

## 2018-10-31 DIAGNOSIS — Z87.442 PERSONAL HISTORY OF URINARY CALCULI: ICD-10-CM

## 2018-10-31 DIAGNOSIS — E83.50 UNSPECIFIED DISORDER OF CALCIUM METABOLISM: ICD-10-CM

## 2018-10-31 PROCEDURE — 99214 OFFICE O/P EST MOD 30 MIN: CPT

## 2018-10-31 PROCEDURE — 76770 US EXAM ABDO BACK WALL COMP: CPT

## 2018-10-31 PROCEDURE — 76770 US EXAM ABDO BACK WALL COMP: CPT | Mod: 26

## 2019-04-02 ENCOUNTER — APPOINTMENT (OUTPATIENT)
Dept: UROLOGY | Facility: CLINIC | Age: 44
End: 2019-04-02
Payer: MEDICAID

## 2019-04-02 VITALS
OXYGEN SATURATION: 96 % | HEART RATE: 99 BPM | DIASTOLIC BLOOD PRESSURE: 80 MMHG | TEMPERATURE: 98.4 F | SYSTOLIC BLOOD PRESSURE: 128 MMHG

## 2019-04-02 DIAGNOSIS — N20.1 CALCULUS OF URETER: ICD-10-CM

## 2019-04-02 PROCEDURE — 99214 OFFICE O/P EST MOD 30 MIN: CPT

## 2019-04-04 NOTE — ASSESSMENT
[FreeTextEntry1] : CT scan reviewed: left mid-distal ureter stone ~ 7 mm , plus left renal calculi up to 6 mm (but multiple).  Largest right side stone is  ~ 1.3 cm, but no hydro on right.\par \par I had long discussion with patient about their stones, and about options, risks, and benefits of all treatments.  Main options for definitive stone treatment include ESWL, URS, PCNL.  \par \par ESWL success best with smaller, less dense stones, and with short skin to stone distance and favorable location of the stone within the urinary tract, while URS is more successful treatment with multiple stones, more dense stones, or challenging body habitus or stone location.  PCNL is best option for larger, more dense and complex stones, and particularly those involving the lower pole.  Non-definitive stone treatment options for drainage, using either stents or nephrostomy, also reviewed: these are of lower immediate surgical risks, but incur multiple procedures to manage and may have their own complications and effects on quality of life.  Still, nephrostomy or nephroureteral catheter can allow maintenance of urinary system drainage without surgical risks, and management in office with exchanges (avoiding the anesthesia and testing which would be present with bilateral internal stent exchange).\par \par Risks of nontreatment with obstruction can lead to very high rate of renal function loss in stone-bearing kidney over the next months to years.\par \par In this patient's case, I recommend... left URS with laser, stone removal both ureter and kidney, stent exchange, then w/u and manage hyper PTH, and then deal with nonobstructing right renal stones once risks managed.  Pt in full agreement\par \par Risks/benefits/success/recovery expectations all reviewed at length, particularly with respect to patient's comorbidities, and inclusive of infection/sepsis, bleeding, need for secondary procedures or secondary stages such as embolization or open surgery, and even risks of death due to acute issues superimposed on comorbidities.\par \par Pt prefers to undergo: left URS with laser, stone removal both ureter and kidney, stent exchange, then w/u and manage hyper PTH, and then deal with nonobstructing right renal stones once risks managed.\par \par Will schedule.  Also, appt with Dr. Torito Moore re hyperparathyroid.\par \par Urine culture, PST appt to schedule once surgery scheduled.\par

## 2019-04-04 NOTE — HISTORY OF PRESENT ILLNESS
[FreeTextEntry1] : Pt returns, last visit seen 10/31/18- has not f/u.  Recently diagnosed with new stones, given colic. Found to have bilat renal stone, left ureter stone, and stented ([place]).\par \par Did not f/u with 24 hour urine\par \par During recent hospitalization, diagnosed with hyperparathyroid.\par Brings films for review.  ([place] scan neck, CT scan.)

## 2019-04-11 ENCOUNTER — OUTPATIENT (OUTPATIENT)
Dept: OUTPATIENT SERVICES | Facility: HOSPITAL | Age: 44
LOS: 1 days | End: 2019-04-11
Payer: MEDICAID

## 2019-04-11 VITALS
WEIGHT: 248.02 LBS | SYSTOLIC BLOOD PRESSURE: 115 MMHG | HEIGHT: 65 IN | RESPIRATION RATE: 14 BRPM | OXYGEN SATURATION: 96 % | TEMPERATURE: 98 F | HEART RATE: 97 BPM | DIASTOLIC BLOOD PRESSURE: 85 MMHG

## 2019-04-11 DIAGNOSIS — N20.0 CALCULUS OF KIDNEY: ICD-10-CM

## 2019-04-11 DIAGNOSIS — N20.1 CALCULUS OF URETER: ICD-10-CM

## 2019-04-11 DIAGNOSIS — Z01.84 ENCOUNTER FOR ANTIBODY RESPONSE EXAMINATION: ICD-10-CM

## 2019-04-11 DIAGNOSIS — Z98.890 OTHER SPECIFIED POSTPROCEDURAL STATES: Chronic | ICD-10-CM

## 2019-04-11 DIAGNOSIS — E21.3 HYPERPARATHYROIDISM, UNSPECIFIED: ICD-10-CM

## 2019-04-11 LAB
ANION GAP SERPL CALC-SCNC: 13 MMOL/L — SIGNIFICANT CHANGE UP (ref 5–17)
BUN SERPL-MCNC: 13 MG/DL — SIGNIFICANT CHANGE UP (ref 7–23)
CALCIUM SERPL-MCNC: 11.8 MG/DL — HIGH (ref 8.4–10.5)
CHLORIDE SERPL-SCNC: 111 MMOL/L — HIGH (ref 96–108)
CO2 SERPL-SCNC: 19 MMOL/L — LOW (ref 22–31)
CREAT SERPL-MCNC: 1.08 MG/DL — SIGNIFICANT CHANGE UP (ref 0.5–1.3)
GLUCOSE SERPL-MCNC: 93 MG/DL — SIGNIFICANT CHANGE UP (ref 70–99)
HCT VFR BLD CALC: 42.6 % — SIGNIFICANT CHANGE UP (ref 39–50)
HGB BLD-MCNC: 13.7 G/DL — SIGNIFICANT CHANGE UP (ref 13–17)
MCHC RBC-ENTMCNC: 29.3 PG — SIGNIFICANT CHANGE UP (ref 27–34)
MCHC RBC-ENTMCNC: 32.2 GM/DL — SIGNIFICANT CHANGE UP (ref 32–36)
MCV RBC AUTO: 91 FL — SIGNIFICANT CHANGE UP (ref 80–100)
PLATELET # BLD AUTO: 193 K/UL — SIGNIFICANT CHANGE UP (ref 150–400)
POTASSIUM SERPL-MCNC: 3.9 MMOL/L — SIGNIFICANT CHANGE UP (ref 3.5–5.3)
POTASSIUM SERPL-SCNC: 3.9 MMOL/L — SIGNIFICANT CHANGE UP (ref 3.5–5.3)
RBC # BLD: 4.68 M/UL — SIGNIFICANT CHANGE UP (ref 4.2–5.8)
RBC # FLD: 14.5 % — SIGNIFICANT CHANGE UP (ref 10.3–14.5)
SODIUM SERPL-SCNC: 143 MMOL/L — SIGNIFICANT CHANGE UP (ref 135–145)
WBC # BLD: 7.2 K/UL — SIGNIFICANT CHANGE UP (ref 3.8–10.5)
WBC # FLD AUTO: 7.2 K/UL — SIGNIFICANT CHANGE UP (ref 3.8–10.5)

## 2019-04-11 PROCEDURE — 80048 BASIC METABOLIC PNL TOTAL CA: CPT

## 2019-04-11 PROCEDURE — G0463: CPT

## 2019-04-11 PROCEDURE — 87086 URINE CULTURE/COLONY COUNT: CPT

## 2019-04-11 PROCEDURE — 85027 COMPLETE CBC AUTOMATED: CPT

## 2019-04-11 RX ORDER — LIDOCAINE HCL 20 MG/ML
0.2 VIAL (ML) INJECTION ONCE
Qty: 0 | Refills: 0 | Status: DISCONTINUED | OUTPATIENT
Start: 2019-04-18 | End: 2019-04-18

## 2019-04-11 RX ORDER — SODIUM CHLORIDE 9 MG/ML
3 INJECTION INTRAMUSCULAR; INTRAVENOUS; SUBCUTANEOUS EVERY 8 HOURS
Qty: 0 | Refills: 0 | Status: DISCONTINUED | OUTPATIENT
Start: 2019-04-18 | End: 2019-04-18

## 2019-04-11 RX ORDER — CEFAZOLIN SODIUM 1 G
2000 VIAL (EA) INJECTION ONCE
Qty: 0 | Refills: 0 | Status: DISCONTINUED | OUTPATIENT
Start: 2019-04-18 | End: 2019-05-03

## 2019-04-11 NOTE — H&P PST ADULT - NSICDXPROBLEM_GEN_ALL_CORE_FT
PROBLEM DIAGNOSES  Problem: Kidney stones  Assessment and Plan: Scheduled cystoscopy, left stent exchange, left ureteroscopy with laser litho     Problem: Hyperparathyroidism  Assessment and Plan: Scheduled to see Dr. Moore after the cysto to w/u hyperparathyroidism PROBLEM DIAGNOSES  Problem: Kidney stones  Assessment and Plan: Scheduled cystoscopy, left stent exchange, left ureteroscopy with laser litho     Problem: Hyperparathyroidism  Assessment and Plan: Scheduled to see Dr. Moore on 5/2/19  to w/u hyperparathyroidism

## 2019-04-11 NOTE — H&P PST ADULT - HISTORY OF PRESENT ILLNESS
44 y/o male, poor medical historian, w/ pmhx of obesity, hyperparathyroid, nephrolithiasis s/p multiple cystoscopies and PCNLs in the past, w/ new stone and renal colic s/p left stent placement in 3/18/19. Continues to experience renal colic and left CVA tenderness. He was given a prescription for Cefdinir but is non-compliant with meds - only take antibiotic when he sees blood in his urine. Denies recent fever or chills. Presents for cystoscopy, left stent exchange, left ureteroscopy with laser litho.     ** Pt was told that he needs to have an adult take him home and stay with him later that day. He states he is unsure of who can take him home but will find someone. If he is unable to find someone he was instructed to call Dr. Hoenig's office. 44 y/o male w/ pmhx of obesity, hyperparathyroidism, nephrolithiasis s/p multiple cystoscopies and PCNLs in the past, w/ new stone and renal colic s/p left stent placement in 3/18/19. Continues to experience renal colic and left CVA tenderness and gross hematuria. He was given a prescription for antibiotics but is non-compliant with meds - only takes antibiotic when he sees blood in his urine. Denies recent fever or chills. Presents for cystoscopy, left stent exchange, left ureteroscopy with laser litho.     ** Pt was told that he needs to have an adult take him home and stay with him later that day. He states he is unsure of who can take him home but will find someone. If he is unable to find someone he was instructed to call Dr. Hoenig's office.

## 2019-04-11 NOTE — H&P PST ADULT - NSICDXPASTSURGICALHX_GEN_ALL_CORE_FT
PAST SURGICAL HISTORY:  H/O right knee surgery 1997    History of urethral stent x2, S/P nephrostolithotomy

## 2019-04-12 LAB
CULTURE RESULTS: NO GROWTH — SIGNIFICANT CHANGE UP
SPECIMEN SOURCE: SIGNIFICANT CHANGE UP

## 2019-04-17 ENCOUNTER — TRANSCRIPTION ENCOUNTER (OUTPATIENT)
Age: 44
End: 2019-04-17

## 2019-04-18 ENCOUNTER — RESULT REVIEW (OUTPATIENT)
Age: 44
End: 2019-04-18

## 2019-04-18 ENCOUNTER — APPOINTMENT (OUTPATIENT)
Dept: UROLOGY | Facility: HOSPITAL | Age: 44
End: 2019-04-18

## 2019-04-18 ENCOUNTER — OUTPATIENT (OUTPATIENT)
Dept: OUTPATIENT SERVICES | Facility: HOSPITAL | Age: 44
LOS: 1 days | End: 2019-04-18
Payer: MEDICAID

## 2019-04-18 VITALS
HEART RATE: 71 BPM | DIASTOLIC BLOOD PRESSURE: 78 MMHG | RESPIRATION RATE: 18 BRPM | OXYGEN SATURATION: 99 % | TEMPERATURE: 98 F | SYSTOLIC BLOOD PRESSURE: 124 MMHG

## 2019-04-18 VITALS
TEMPERATURE: 98 F | HEART RATE: 58 BPM | HEIGHT: 65 IN | DIASTOLIC BLOOD PRESSURE: 93 MMHG | RESPIRATION RATE: 16 BRPM | SYSTOLIC BLOOD PRESSURE: 138 MMHG | WEIGHT: 248.02 LBS | OXYGEN SATURATION: 98 %

## 2019-04-18 DIAGNOSIS — N20.1 CALCULUS OF URETER: ICD-10-CM

## 2019-04-18 DIAGNOSIS — Z98.890 OTHER SPECIFIED POSTPROCEDURAL STATES: Chronic | ICD-10-CM

## 2019-04-18 DIAGNOSIS — N20.0 CALCULUS OF KIDNEY: ICD-10-CM

## 2019-04-18 PROCEDURE — 76000 FLUOROSCOPY <1 HR PHYS/QHP: CPT

## 2019-04-18 PROCEDURE — 88300 SURGICAL PATH GROSS: CPT

## 2019-04-18 PROCEDURE — 74420 UROGRAPHY RTRGR +-KUB: CPT | Mod: 26

## 2019-04-18 PROCEDURE — C1889: CPT

## 2019-04-18 PROCEDURE — 52352 CYSTOURETERO W/STONE REMOVE: CPT | Mod: 22,LT

## 2019-04-18 PROCEDURE — C1758: CPT

## 2019-04-18 PROCEDURE — C1769: CPT

## 2019-04-18 PROCEDURE — 82365 CALCULUS SPECTROSCOPY: CPT

## 2019-04-18 PROCEDURE — 52352 CYSTOURETERO W/STONE REMOVE: CPT | Mod: LT

## 2019-04-18 PROCEDURE — 88300 SURGICAL PATH GROSS: CPT | Mod: 26

## 2019-04-18 RX ORDER — ONDANSETRON 8 MG/1
4 TABLET, FILM COATED ORAL ONCE
Qty: 0 | Refills: 0 | Status: DISCONTINUED | OUTPATIENT
Start: 2019-04-18 | End: 2019-04-18

## 2019-04-18 RX ORDER — SODIUM CHLORIDE 9 MG/ML
1000 INJECTION, SOLUTION INTRAVENOUS
Qty: 0 | Refills: 0 | Status: DISCONTINUED | OUTPATIENT
Start: 2019-04-18 | End: 2019-05-03

## 2019-04-18 RX ORDER — CEPHALEXIN 500 MG
1 CAPSULE ORAL
Qty: 4 | Refills: 0 | OUTPATIENT
Start: 2019-04-18 | End: 2019-04-19

## 2019-04-18 RX ORDER — HYDROMORPHONE HYDROCHLORIDE 2 MG/ML
0.5 INJECTION INTRAMUSCULAR; INTRAVENOUS; SUBCUTANEOUS
Qty: 0 | Refills: 0 | Status: DISCONTINUED | OUTPATIENT
Start: 2019-04-18 | End: 2019-04-18

## 2019-04-18 RX ADMIN — SODIUM CHLORIDE 3 MILLILITER(S): 9 INJECTION INTRAMUSCULAR; INTRAVENOUS; SUBCUTANEOUS at 06:16

## 2019-04-18 NOTE — ASU PATIENT PROFILE, ADULT - PATIENT REPRESENTATIVE PHONE
Family education completed:NO    Report given to: Dilcia RN    Time of transfer: 1245    Transferred to: Unit 6 RM 6162    Belongings sent:Yes    Family updated:Yes    Reviewed pertinent information from EPIC (EMAR/Clinical Summary/Flowsheets):Yes    Head-to-toe assessment with receiving RN:Yes    Recommendations (e.g. Family needs/recent issues/things to watch for): Breastfeeding started.      [phone]

## 2019-04-18 NOTE — ASU PATIENT PROFILE, ADULT - PRO MENTAL HEALTH SX RECENT
continue current management.  Psychoeducation again done re: benefits and potential SE of current Rx with patient saying "ok" none

## 2019-04-23 LAB — COMPN STONE: SIGNIFICANT CHANGE UP

## 2019-04-25 LAB — SURGICAL PATHOLOGY STUDY: SIGNIFICANT CHANGE UP

## 2019-05-01 ENCOUNTER — OUTPATIENT (OUTPATIENT)
Dept: OUTPATIENT SERVICES | Facility: HOSPITAL | Age: 44
LOS: 1 days | End: 2019-05-01
Payer: MEDICAID

## 2019-05-01 DIAGNOSIS — Z98.890 OTHER SPECIFIED POSTPROCEDURAL STATES: Chronic | ICD-10-CM

## 2019-05-01 PROCEDURE — G9001: CPT

## 2019-05-02 ENCOUNTER — APPOINTMENT (OUTPATIENT)
Dept: SURGERY | Facility: CLINIC | Age: 44
End: 2019-05-02
Payer: MEDICAID

## 2019-05-02 VITALS
SYSTOLIC BLOOD PRESSURE: 137 MMHG | HEIGHT: 68 IN | DIASTOLIC BLOOD PRESSURE: 93 MMHG | HEART RATE: 81 BPM | WEIGHT: 240 LBS | BODY MASS INDEX: 36.37 KG/M2

## 2019-05-02 PROBLEM — E21.3 HYPERPARATHYROIDISM, UNSPECIFIED: Chronic | Status: ACTIVE | Noted: 2019-04-11

## 2019-05-02 PROCEDURE — 99204 OFFICE O/P NEW MOD 45 MIN: CPT | Mod: 25

## 2019-05-02 PROCEDURE — 31575 DIAGNOSTIC LARYNGOSCOPY: CPT

## 2019-05-03 NOTE — CONSULT LETTER
[Dear  ___] : Dear  [unfilled], [Consult Letter:] : I had the pleasure of evaluating your patient, [unfilled]. [Please see my note below.] : Please see my note below. [Consult Closing:] : Thank you very much for allowing me to participate in the care of this patient.  If you have any questions, please do not hesitate to contact me. [Sincerely,] : Sincerely, [FreeTextEntry2] : Dr. David Hoenig, Dr. Sekou Castro [DrRoberto  ___] : Dr. DE LA VEGA [FreeTextEntry3] : Torito Moore MD, FACS\par System Director, Endocrine Surgery\par [facility]\par

## 2019-05-03 NOTE — HISTORY OF PRESENT ILLNESS
[de-identified] : Pt c/o recurrent kidney stones.   denies bone pain, fatigue, constipation, dysphagia, hoarseness  or RT exposure\par Ca 11.6,  ,  Vitamin  D 58.6,  24 hr Urine  calcium 433\par SPECT:   Left lower and superior mediastinum parathyroids

## 2019-05-03 NOTE — ASSESSMENT
[FreeTextEntry1] : lengthy discussion regarding options for management. in view of labs and symptoms have recommended minimally invasive parathyroidectomy with PTH assay. risks, benefits and alternatives discussed at length. wishes to proceed with surgery. to be scheduled at [facility]. will require preop 4D CT and thyroid sonogram\par

## 2019-05-03 NOTE — PHYSICAL EXAM
[de-identified] : no palpable thyroid nodules [Nasal Endoscopy Performed] : nasal endoscopy was performed, see procedure section for findings [Laryngoscopy Performed] : laryngoscopy was performed, see procedure section for findings [Midline] : located in midline position [de-identified] : fiberoptic laryngoscopy shows normal vocal cord mobility bilaterally with no lesions noted [Normal] : orientation to person, place, and time: normal

## 2019-05-07 ENCOUNTER — OUTPATIENT (OUTPATIENT)
Dept: OUTPATIENT SERVICES | Facility: HOSPITAL | Age: 44
LOS: 1 days | End: 2019-05-07

## 2019-05-07 VITALS
WEIGHT: 244.93 LBS | DIASTOLIC BLOOD PRESSURE: 90 MMHG | SYSTOLIC BLOOD PRESSURE: 132 MMHG | TEMPERATURE: 98 F | HEART RATE: 95 BPM | RESPIRATION RATE: 16 BRPM | OXYGEN SATURATION: 98 % | HEIGHT: 68 IN

## 2019-05-07 DIAGNOSIS — E21.0 PRIMARY HYPERPARATHYROIDISM: ICD-10-CM

## 2019-05-07 DIAGNOSIS — Z98.890 OTHER SPECIFIED POSTPROCEDURAL STATES: Chronic | ICD-10-CM

## 2019-05-07 DIAGNOSIS — E21.3 HYPERPARATHYROIDISM, UNSPECIFIED: ICD-10-CM

## 2019-05-07 LAB
ANION GAP SERPL CALC-SCNC: 11 MMO/L — SIGNIFICANT CHANGE UP (ref 7–14)
BUN SERPL-MCNC: 15 MG/DL — SIGNIFICANT CHANGE UP (ref 7–23)
CALCIUM SERPL-MCNC: 11.9 MG/DL — HIGH (ref 8.4–10.5)
CHLORIDE SERPL-SCNC: 112 MMOL/L — HIGH (ref 98–107)
CO2 SERPL-SCNC: 22 MMOL/L — SIGNIFICANT CHANGE UP (ref 22–31)
CREAT SERPL-MCNC: 1.24 MG/DL — SIGNIFICANT CHANGE UP (ref 0.5–1.3)
GLUCOSE SERPL-MCNC: 97 MG/DL — SIGNIFICANT CHANGE UP (ref 70–99)
HCT VFR BLD CALC: 47.8 % — SIGNIFICANT CHANGE UP (ref 39–50)
HGB BLD-MCNC: 15.2 G/DL — SIGNIFICANT CHANGE UP (ref 13–17)
MCHC RBC-ENTMCNC: 29 PG — SIGNIFICANT CHANGE UP (ref 27–34)
MCHC RBC-ENTMCNC: 31.8 % — LOW (ref 32–36)
MCV RBC AUTO: 91.2 FL — SIGNIFICANT CHANGE UP (ref 80–100)
NRBC # FLD: 0 K/UL — SIGNIFICANT CHANGE UP (ref 0–0)
PLATELET # BLD AUTO: 227 K/UL — SIGNIFICANT CHANGE UP (ref 150–400)
PMV BLD: 11.5 FL — SIGNIFICANT CHANGE UP (ref 7–13)
POTASSIUM SERPL-MCNC: 3.9 MMOL/L — SIGNIFICANT CHANGE UP (ref 3.5–5.3)
POTASSIUM SERPL-SCNC: 3.9 MMOL/L — SIGNIFICANT CHANGE UP (ref 3.5–5.3)
RBC # BLD: 5.24 M/UL — SIGNIFICANT CHANGE UP (ref 4.2–5.8)
RBC # FLD: 15.5 % — HIGH (ref 10.3–14.5)
SODIUM SERPL-SCNC: 145 MMOL/L — SIGNIFICANT CHANGE UP (ref 135–145)
WBC # BLD: 8.11 K/UL — SIGNIFICANT CHANGE UP (ref 3.8–10.5)
WBC # FLD AUTO: 8.11 K/UL — SIGNIFICANT CHANGE UP (ref 3.8–10.5)

## 2019-05-07 NOTE — H&P PST ADULT - HISTORY OF PRESENT ILLNESS
44 y/o male w/ pmhx of obesity, hyperparathyroidism, nephrolithiasis s/p multiple cystoscopies and PCNLs in the past, w/ new stone and renal colic s/p left stent placement in 3/18/19. Continues to experience renal colic and left CVA tenderness and gross hematuria. He was given a prescription for antibiotics but is non-compliant with meds - only takes antibiotic when he sees blood in his urine. Denies recent fever or chills. Presents for cystoscopy, left stent exchange, left ureteroscopy with laser litho.     ** Pt was told that he needs to have an adult take him home and stay with him later that day. He states he is unsure of who can take him home but will find someone. If he is unable to find someone he was instructed to call Dr. Hoenig's office. Pt is a 43 yr old male scheduled for Parathyroidectomy with PTH Hormone Assay with Dr Moore 5/13/19. PT states hx of kidney stones for 10 years and was w/u and Dx with Hyperparathyroidism. Pt had stone removed 3/19 with stents removed 4/19 - pt denies fever or dysuria or hematuria.

## 2019-05-07 NOTE — H&P PST ADULT - NSICDXPROBLEM_GEN_ALL_CORE_FT
PROBLEM DIAGNOSES  Problem: Hyperparathyroidism  Assessment and Plan: Pt scheduled for surgery and verbalized understanding of preop instructions including for Famotidine and Chlorhexidine

## 2019-05-07 NOTE — H&P PST ADULT - LAST ECHOCARDIOGRAM
We are committed to providing our patients with their test results in a timely manner. If you have access to [facility], you will receive your results within 5 to 7 days. If your results are normal, a member of our office may call you or you may receive a letter in the mail within 10 to 15 days of your testing. If your results have something additional to discuss, a member of our office will contact you by phone. If at any time you have questions related your results, please feel free to call our office at [phone]   denies

## 2019-05-07 NOTE — H&P PST ADULT - PAIN SCALE PREFERRED, PROFILE
H/O cataract  bilateral 2013  Leg fracture  right ORIF with hardware 2010  S/P cholecystectomy numerical 0-10

## 2019-05-07 NOTE — H&P PST ADULT - GENITOURINARY COMMENTS
hx of numerous kidney stones for past 10 years - BW noted elevated Ca - pt now to have surgery hx of numerous kidney stones for past 10 years - BW noted elevated Ca levels  - pt now to have surgery

## 2019-05-07 NOTE — H&P PST ADULT - NEUROLOGICAL DETAILS
responds to pain/alert and oriented x 3/responds to verbal commands/normal strength/sensation intact

## 2019-05-07 NOTE — H&P PST ADULT - NEGATIVE ENMT SYMPTOMS
no hearing difficulty/no ear pain/no nasal congestion/no vertigo/no sinus symptoms/no throat pain/no dysphagia/no tinnitus

## 2019-05-07 NOTE — H&P PST ADULT - NEGATIVE NEUROLOGICAL SYMPTOMS
no weakness/no generalized seizures/no tremors/no transient paralysis/no paresthesias/no syncope/no focal seizures

## 2019-05-08 ENCOUNTER — FORM ENCOUNTER (OUTPATIENT)
Age: 44
End: 2019-05-08

## 2019-05-08 ENCOUNTER — OUTPATIENT (OUTPATIENT)
Dept: OUTPATIENT SERVICES | Facility: HOSPITAL | Age: 44
LOS: 1 days | End: 2019-05-08
Payer: MEDICAID

## 2019-05-08 ENCOUNTER — APPOINTMENT (OUTPATIENT)
Dept: UROLOGY | Facility: CLINIC | Age: 44
End: 2019-05-08
Payer: MEDICAID

## 2019-05-08 ENCOUNTER — APPOINTMENT (OUTPATIENT)
Dept: ULTRASOUND IMAGING | Facility: CLINIC | Age: 44
End: 2019-05-08
Payer: MEDICAID

## 2019-05-08 ENCOUNTER — APPOINTMENT (OUTPATIENT)
Dept: CT IMAGING | Facility: CLINIC | Age: 44
End: 2019-05-08
Payer: MEDICAID

## 2019-05-08 DIAGNOSIS — E21.0 PRIMARY HYPERPARATHYROIDISM: ICD-10-CM

## 2019-05-08 DIAGNOSIS — Z98.890 OTHER SPECIFIED POSTPROCEDURAL STATES: Chronic | ICD-10-CM

## 2019-05-08 PROCEDURE — 70492 CT SFT TSUE NCK W/O & W/DYE: CPT

## 2019-05-08 PROCEDURE — 70491 CT SOFT TISSUE NECK W/DYE: CPT | Mod: 26

## 2019-05-08 PROCEDURE — 76536 US EXAM OF HEAD AND NECK: CPT | Mod: 26

## 2019-05-08 PROCEDURE — 76536 US EXAM OF HEAD AND NECK: CPT

## 2019-05-08 PROCEDURE — 99213 OFFICE O/P EST LOW 20 MIN: CPT

## 2019-05-08 NOTE — HISTORY OF PRESENT ILLNESS
[FreeTextEntry1] : Pt returns for metabolic evaluation and prevention of future stone disease following recent surgery for stone.  At issue today is review of the stone analysis, risk factors for future stone episodes and establishing whether they have pure dietary, metabolic, or mixed causes for their stone risks which is unrelated to the surgical management of their stone disease.\par \par They underwent L URS with stone removal, no stent on 4/19/19.  Feeling well.\par \par Now for f/u and surgery with Dr. Moore for hyperparathyroid.\par \par Stent status: none\par \par Stone analysis: 90% Calcium phosphate (apatite) 2nd Constituent: See Note RESULT: 10% Calcium carbonate (no surprise given PTH)\par  [None] : no symptoms

## 2019-05-08 NOTE — PHYSICAL EXAM
[General Appearance - Well Developed] : well developed [Normal Appearance] : normal appearance [General Appearance - Well Nourished] : well nourished [Well Groomed] : well groomed [General Appearance - In No Acute Distress] : no acute distress [Edema] : no peripheral edema [] : no respiratory distress [Respiration, Rhythm And Depth] : normal respiratory rhythm and effort [Exaggerated Use Of Accessory Muscles For Inspiration] : no accessory muscle use [Oriented To Time, Place, And Person] : oriented to person, place, and time [Affect] : the affect was normal [Mood] : the mood was normal [Not Anxious] : not anxious [Normal Station and Gait] : the gait and station were normal for the patient's age

## 2019-05-08 NOTE — ASSESSMENT
[FreeTextEntry1] : Stone analysis reviewed.\par \par Need to correct hyper PTH, and return to plan definitive treatment right lower pole stone.\par \par Diet modification reviewed at length- increasing fluids (primarily water), citrus is good, and decreasing/moderating salt, animal flesh protein, oxalate containing foods, and moderation of calcium intake (1000 mg/day is USRDA).\par \par I reviewed with the patient the risks of metabolic stone disease given their underlying risk parameters (all of which include large stones, multiple stones, bilateral stones, family history, and young age), and the indications for 24 hour urine metabolic assessment.\par \par We also discussed benefits of regular exercise and weight loss as independent risk reducers for stones.\par \par 1. Diet modification as discussed\par 2. 24 hour urine collection x 1 in ~ 4 weeks\par 3. RTC with renal US in ~ 6-8 weeks\par 4. surgery with Dr. Moore\par

## 2019-05-09 ENCOUNTER — OUTPATIENT (OUTPATIENT)
Dept: OUTPATIENT SERVICES | Facility: HOSPITAL | Age: 44
LOS: 1 days | End: 2019-05-09
Payer: MEDICAID

## 2019-05-09 ENCOUNTER — RESULT REVIEW (OUTPATIENT)
Age: 44
End: 2019-05-09

## 2019-05-09 ENCOUNTER — OTHER (OUTPATIENT)
Age: 44
End: 2019-05-09

## 2019-05-09 ENCOUNTER — APPOINTMENT (OUTPATIENT)
Dept: ULTRASOUND IMAGING | Facility: IMAGING CENTER | Age: 44
End: 2019-05-09
Payer: MEDICAID

## 2019-05-09 DIAGNOSIS — Z98.890 OTHER SPECIFIED POSTPROCEDURAL STATES: Chronic | ICD-10-CM

## 2019-05-09 DIAGNOSIS — E21.3 HYPERPARATHYROIDISM, UNSPECIFIED: ICD-10-CM

## 2019-05-09 PROBLEM — E66.9 OBESITY, UNSPECIFIED: Chronic | Status: ACTIVE | Noted: 2019-05-07

## 2019-05-09 PROCEDURE — 88172 CYTP DX EVAL FNA 1ST EA SITE: CPT

## 2019-05-09 PROCEDURE — 88173 CYTOPATH EVAL FNA REPORT: CPT

## 2019-05-09 PROCEDURE — 88173 CYTOPATH EVAL FNA REPORT: CPT | Mod: 26

## 2019-05-09 PROCEDURE — 10005 FNA BX W/US GDN 1ST LES: CPT

## 2019-05-10 ENCOUNTER — OTHER (OUTPATIENT)
Age: 44
End: 2019-05-10

## 2019-05-10 LAB — NON-GYNECOLOGICAL CYTOLOGY STUDY: SIGNIFICANT CHANGE UP

## 2019-05-12 ENCOUNTER — TRANSCRIPTION ENCOUNTER (OUTPATIENT)
Age: 44
End: 2019-05-12

## 2019-05-13 ENCOUNTER — APPOINTMENT (OUTPATIENT)
Dept: SURGERY | Facility: HOSPITAL | Age: 44
End: 2019-05-13

## 2019-05-13 ENCOUNTER — RESULT REVIEW (OUTPATIENT)
Age: 44
End: 2019-05-13

## 2019-05-13 ENCOUNTER — OUTPATIENT (OUTPATIENT)
Dept: OUTPATIENT SERVICES | Facility: HOSPITAL | Age: 44
LOS: 1 days | Discharge: HOME | End: 2019-05-13
Payer: MEDICAID

## 2019-05-13 ENCOUNTER — OTHER (OUTPATIENT)
Age: 44
End: 2019-05-13

## 2019-05-13 VITALS
WEIGHT: 244.93 LBS | HEIGHT: 68 IN | TEMPERATURE: 98 F | RESPIRATION RATE: 18 BRPM | OXYGEN SATURATION: 97 % | SYSTOLIC BLOOD PRESSURE: 120 MMHG | HEART RATE: 90 BPM | DIASTOLIC BLOOD PRESSURE: 80 MMHG

## 2019-05-13 VITALS
HEART RATE: 91 BPM | RESPIRATION RATE: 12 BRPM | SYSTOLIC BLOOD PRESSURE: 125 MMHG | DIASTOLIC BLOOD PRESSURE: 89 MMHG | OXYGEN SATURATION: 99 %

## 2019-05-13 DIAGNOSIS — Z98.890 OTHER SPECIFIED POSTPROCEDURAL STATES: Chronic | ICD-10-CM

## 2019-05-13 DIAGNOSIS — E21.0 PRIMARY HYPERPARATHYROIDISM: ICD-10-CM

## 2019-05-13 PROCEDURE — 60500 EXPLORE PARATHYROID GLANDS: CPT

## 2019-05-13 PROCEDURE — 60500 EXPLORE PARATHYROID GLANDS: CPT | Mod: AS

## 2019-05-13 PROCEDURE — 38510 BIOPSY/REMOVAL LYMPH NODES: CPT | Mod: 59

## 2019-05-13 PROCEDURE — 60210 PARTIAL THYROID EXCISION: CPT | Mod: 59

## 2019-05-13 PROCEDURE — 60210 PARTIAL THYROID EXCISION: CPT | Mod: AS,59

## 2019-05-13 PROCEDURE — 88331 PATH CONSLTJ SURG 1 BLK 1SPC: CPT | Mod: 26

## 2019-05-13 PROCEDURE — 88305 TISSUE EXAM BY PATHOLOGIST: CPT | Mod: 26

## 2019-05-13 RX ORDER — ACETAMINOPHEN 500 MG
650 TABLET ORAL EVERY 6 HOURS
Refills: 0 | Status: DISCONTINUED | OUTPATIENT
Start: 2019-05-13 | End: 2019-05-14

## 2019-05-13 RX ORDER — OXYCODONE AND ACETAMINOPHEN 5; 325 MG/1; MG/1
1 TABLET ORAL EVERY 4 HOURS
Refills: 0 | Status: DISCONTINUED | OUTPATIENT
Start: 2019-05-13 | End: 2019-05-14

## 2019-05-13 RX ORDER — CALCIUM CARBONATE 500(1250)
1 TABLET ORAL
Refills: 0 | Status: DISCONTINUED | OUTPATIENT
Start: 2019-05-13 | End: 2019-05-14

## 2019-05-13 RX ORDER — SODIUM CHLORIDE 9 MG/ML
1000 INJECTION, SOLUTION INTRAVENOUS
Refills: 0 | Status: DISCONTINUED | OUTPATIENT
Start: 2019-05-13 | End: 2019-05-14

## 2019-05-13 RX ADMIN — Medication 1 TABLET(S): at 18:19

## 2019-05-13 NOTE — ASU DISCHARGE PLAN (ADULT/PEDIATRIC) - CARE PROVIDER_API CALL
Torito Moore)  Plastic Surgery; Surgery  [address], [address]  [place] [address]  Phone: [phone]  Fax: [phone]  Follow Up Time:

## 2019-05-13 NOTE — ASU DISCHARGE PLAN (ADULT/PEDIATRIC) - NURSING INSTRUCTIONS
You were given intravenous TYLENOL for pain management at _______________. Please DO NOT take any products containing TYLENOL or ACETAMINOPHEN, such as VICODIN, PERCOCET, EXCEDRIN, and any over-the-counter cold medication for the next 6 hours (until ______________). DO NOT TAKE MORE THAN 3000 MG OF TYLENOL in a 24 hour period.  *******************************************************   Decrease TUMS to 1 daily 5/15

## 2019-05-13 NOTE — ASU DISCHARGE PLAN (ADULT/PEDIATRIC) - CALL YOUR DOCTOR IF YOU HAVE ANY OF THE FOLLOWING:
Numbness, tingling, color or temperature change to extremity/Pain not relieved by Medications/Bleeding that does not stop/Swelling that gets worse/Wound/Surgical Site with redness, or foul smelling discharge or pus

## 2019-05-14 DIAGNOSIS — Z71.89 OTHER SPECIFIED COUNSELING: ICD-10-CM

## 2019-05-16 LAB — SURGICAL PATHOLOGY STUDY: SIGNIFICANT CHANGE UP

## 2019-06-04 ENCOUNTER — APPOINTMENT (OUTPATIENT)
Dept: SURGERY | Facility: CLINIC | Age: 44
End: 2019-06-04
Payer: MEDICAID

## 2019-06-04 PROCEDURE — 99024 POSTOP FOLLOW-UP VISIT: CPT

## 2019-06-04 PROCEDURE — 36415 COLL VENOUS BLD VENIPUNCTURE: CPT

## 2019-06-04 NOTE — PHYSICAL EXAM
[de-identified] : well healed scar [Midline] : located in midline position [Normal] : orientation to person, place, and time: normal

## 2019-06-05 ENCOUNTER — RESULT REVIEW (OUTPATIENT)
Age: 44
End: 2019-06-05

## 2019-06-05 LAB
25(OH)D3 SERPL-MCNC: 14.5 NG/ML
CALCIUM SERPL-MCNC: 9.6 MG/DL
CALCIUM SERPL-MCNC: 9.6 MG/DL
PARATHYROID HORMONE INTACT: 163 PG/ML

## 2019-06-06 ENCOUNTER — RESULT REVIEW (OUTPATIENT)
Age: 44
End: 2019-06-06

## 2019-07-30 ENCOUNTER — FORM ENCOUNTER (OUTPATIENT)
Age: 44
End: 2019-07-30

## 2019-07-31 ENCOUNTER — APPOINTMENT (OUTPATIENT)
Dept: CT IMAGING | Facility: IMAGING CENTER | Age: 44
End: 2019-07-31
Payer: MEDICAID

## 2019-07-31 ENCOUNTER — APPOINTMENT (OUTPATIENT)
Dept: UROLOGY | Facility: CLINIC | Age: 44
End: 2019-07-31
Payer: MEDICAID

## 2019-07-31 ENCOUNTER — OUTPATIENT (OUTPATIENT)
Dept: OUTPATIENT SERVICES | Facility: HOSPITAL | Age: 44
LOS: 1 days | End: 2019-07-31
Payer: MEDICAID

## 2019-07-31 DIAGNOSIS — N20.0 CALCULUS OF KIDNEY: ICD-10-CM

## 2019-07-31 DIAGNOSIS — Z98.890 OTHER SPECIFIED POSTPROCEDURAL STATES: Chronic | ICD-10-CM

## 2019-07-31 DIAGNOSIS — R35.0 FREQUENCY OF MICTURITION: ICD-10-CM

## 2019-07-31 PROCEDURE — 76775 US EXAM ABDO BACK WALL LIM: CPT | Mod: 26

## 2019-07-31 PROCEDURE — 99214 OFFICE O/P EST MOD 30 MIN: CPT | Mod: 25

## 2019-07-31 PROCEDURE — 76775 US EXAM ABDO BACK WALL LIM: CPT

## 2019-07-31 PROCEDURE — 74176 CT ABD & PELVIS W/O CONTRAST: CPT

## 2019-07-31 PROCEDURE — 74176 CT ABD & PELVIS W/O CONTRAST: CPT | Mod: 26

## 2019-07-31 NOTE — ASSESSMENT
[FreeTextEntry1] : Renal US today; recurrent and progressive stone on both sides- now with left side lower pole 7 to 8 mm, and probable second small shadowing stone also in lower, lower mid.  Right side with mult stones throughout kidney, shadowing, in multiple calyces.  No definite hydronephrosis at this time.\par \par 1. CT stone saul in f/u, given sig changes in size of stone and for surgical planning\par 2. will begin process to schedule for right PCNL, possibly with second look/left URS depending on CT results\par 3. recheck pth and bmp today, will review by phone

## 2019-07-31 NOTE — HISTORY OF PRESENT ILLNESS
[FreeTextEntry1] : Pt returns in f/u- has recurrent stones due to hyperparathyroid- now s/p parathyroidectomy.\par \par Pt notes passing stones small fragments\par \par calcium 9.6 wnl 6/4/19 (prior was 11.6), pth still elev at 163.\par \par  [None] : no symptoms

## 2019-08-01 DIAGNOSIS — N20.0 CALCULUS OF KIDNEY: ICD-10-CM

## 2019-08-01 DIAGNOSIS — E21.0 PRIMARY HYPERPARATHYROIDISM: ICD-10-CM

## 2019-08-01 LAB
ANION GAP SERPL CALC-SCNC: 11 MMOL/L
BUN SERPL-MCNC: 19 MG/DL
CALCIUM SERPL-MCNC: 11.1 MG/DL
CALCIUM SERPL-MCNC: 11.1 MG/DL
CHLORIDE SERPL-SCNC: 110 MMOL/L
CO2 SERPL-SCNC: 22 MMOL/L
CREAT SERPL-MCNC: 1.14 MG/DL
GLUCOSE SERPL-MCNC: 106 MG/DL
PARATHYROID HORMONE INTACT: 144 PG/ML
POTASSIUM SERPL-SCNC: 4.4 MMOL/L
SODIUM SERPL-SCNC: 143 MMOL/L

## 2019-08-08 ENCOUNTER — APPOINTMENT (OUTPATIENT)
Dept: SURGERY | Facility: CLINIC | Age: 44
End: 2019-08-08
Payer: MEDICAID

## 2019-08-08 ENCOUNTER — OUTPATIENT (OUTPATIENT)
Dept: OUTPATIENT SERVICES | Facility: HOSPITAL | Age: 44
LOS: 1 days | End: 2019-08-08
Payer: MEDICAID

## 2019-08-08 ENCOUNTER — APPOINTMENT (OUTPATIENT)
Dept: CT IMAGING | Facility: IMAGING CENTER | Age: 44
End: 2019-08-08
Payer: MEDICAID

## 2019-08-08 DIAGNOSIS — E21.3 HYPERPARATHYROIDISM, UNSPECIFIED: ICD-10-CM

## 2019-08-08 DIAGNOSIS — Z98.890 OTHER SPECIFIED POSTPROCEDURAL STATES: Chronic | ICD-10-CM

## 2019-08-08 LAB
CALCIUM SERPL-MCNC: 11 MG/DL
CALCIUM SERPL-MCNC: 11 MG/DL
PARATHYROID HORMONE INTACT: 134 PG/ML

## 2019-08-08 PROCEDURE — 36415 COLL VENOUS BLD VENIPUNCTURE: CPT

## 2019-08-08 PROCEDURE — 70491 CT SOFT TISSUE NECK W/DYE: CPT | Mod: 26

## 2019-08-08 PROCEDURE — 70492 CT SFT TSUE NCK W/O & W/DYE: CPT

## 2019-08-08 PROCEDURE — 99024 POSTOP FOLLOW-UP VISIT: CPT

## 2019-08-08 NOTE — ASSESSMENT
[FreeTextEntry1] : bloods drawn. 4D CT requested.  to call next week for results.   suspect another gland now hyperactive

## 2019-08-08 NOTE — PHYSICAL EXAM
[de-identified] : healing scar [de-identified] : no palpable thyroid nodules [Nasal Endoscopy Performed] : nasal endoscopy was performed, see procedure section for findings [Laryngoscopy Performed] : laryngoscopy was performed, see procedure section for findings [Midline] : located in midline position [Normal] : orientation to person, place, and time: normal

## 2019-08-08 NOTE — HISTORY OF PRESENT ILLNESS
[de-identified] : 3 months s/p parathyroidectomy with recurrent hypercalcemia and recurrent stones.  denies dysphagia, hoarseness or new lesions.

## 2019-08-09 LAB — 24R-OH-CALCIDIOL SERPL-MCNC: 74.5 PG/ML

## 2019-08-12 ENCOUNTER — OUTPATIENT (OUTPATIENT)
Dept: OUTPATIENT SERVICES | Facility: HOSPITAL | Age: 44
LOS: 1 days | End: 2019-08-12
Payer: MEDICAID

## 2019-08-12 VITALS
HEART RATE: 68 BPM | WEIGHT: 255.07 LBS | HEIGHT: 68 IN | RESPIRATION RATE: 16 BRPM | OXYGEN SATURATION: 98 % | DIASTOLIC BLOOD PRESSURE: 89 MMHG | TEMPERATURE: 98 F | SYSTOLIC BLOOD PRESSURE: 160 MMHG

## 2019-08-12 DIAGNOSIS — N20.0 CALCULUS OF KIDNEY: ICD-10-CM

## 2019-08-12 DIAGNOSIS — Z98.890 OTHER SPECIFIED POSTPROCEDURAL STATES: Chronic | ICD-10-CM

## 2019-08-12 DIAGNOSIS — Z01.818 ENCOUNTER FOR OTHER PREPROCEDURAL EXAMINATION: ICD-10-CM

## 2019-08-12 LAB
BLD GP AB SCN SERPL QL: NEGATIVE — SIGNIFICANT CHANGE UP
RH IG SCN BLD-IMP: POSITIVE — SIGNIFICANT CHANGE UP

## 2019-08-12 PROCEDURE — G0463: CPT

## 2019-08-12 PROCEDURE — 87086 URINE CULTURE/COLONY COUNT: CPT

## 2019-08-12 PROCEDURE — 86901 BLOOD TYPING SEROLOGIC RH(D): CPT

## 2019-08-12 PROCEDURE — 86900 BLOOD TYPING SEROLOGIC ABO: CPT

## 2019-08-12 PROCEDURE — 86850 RBC ANTIBODY SCREEN: CPT

## 2019-08-12 NOTE — H&P PST ADULT - HISTORY OF PRESENT ILLNESS
42 y/o male w/ pmhx of obesity, hyperparathyroidism s/p parathyroidectomy (7/2019), nephrolithiasis s/p multiple cystoscopies and PCNLs in the past.  C/O experience renal colic and right CVA tenderness. Today he presents to PST for scheduled Right Retrograde, Right Percutaneous Stone Removal on 8/19/19. Denies any palpitations, SOB, N/V, fever or chills.

## 2019-08-12 NOTE — H&P PST ADULT - NSICDXPASTSURGICALHX_GEN_ALL_CORE_FT
PAST SURGICAL HISTORY:  H/O cystoscopy multiple    H/O parathyroidectomy 5/2019    H/O right knee surgery 1997    History of urethral stent x2, S/P nephrostolithotomy

## 2019-08-12 NOTE — H&P PST ADULT - NSICDXPROBLEM_GEN_ALL_CORE_FT
PROBLEM DIAGNOSES  Problem: Kidney stones  Assessment and Plan: Right Retrograde, Right Percutaneous Stone Removal on 8/19/19  Pre-op education provided - all questions answered

## 2019-08-13 LAB
CULTURE RESULTS: NO GROWTH — SIGNIFICANT CHANGE UP
SPECIMEN SOURCE: SIGNIFICANT CHANGE UP

## 2019-08-19 ENCOUNTER — OTHER (OUTPATIENT)
Age: 44
End: 2019-08-19

## 2019-08-19 ENCOUNTER — APPOINTMENT (OUTPATIENT)
Dept: UROLOGY | Facility: HOSPITAL | Age: 44
End: 2019-08-19

## 2019-09-03 ENCOUNTER — APPOINTMENT (OUTPATIENT)
Dept: SURGERY | Facility: CLINIC | Age: 44
End: 2019-09-03
Payer: MEDICAID

## 2019-09-03 PROCEDURE — 99213 OFFICE O/P EST LOW 20 MIN: CPT

## 2019-09-03 NOTE — PHYSICAL EXAM
[de-identified] : well healed scar [Midline] : located in midline position [Normal] : orientation to person, place, and time: normal

## 2019-09-03 NOTE — ASSESSMENT
[FreeTextEntry1] : s/p Parathyroidectomy recurrent hyperparathyroidism\par importance of seeing Dr Martinez discussed and reviewed\par f/u 6 months

## 2019-09-03 NOTE — HISTORY OF PRESENT ILLNESS
[de-identified] : Pt here for follow up parathyroidectomy recent 4 D ct shows parathyroid posterior sternum. Pt has not yet consulted with Dr Martinez had recent kidney stone and had bilateral ureter stents placed in [place]

## 2019-09-18 ENCOUNTER — APPOINTMENT (OUTPATIENT)
Dept: THORACIC SURGERY | Facility: CLINIC | Age: 44
End: 2019-09-18
Payer: MEDICAID

## 2019-09-18 VITALS
HEART RATE: 97 BPM | SYSTOLIC BLOOD PRESSURE: 116 MMHG | RESPIRATION RATE: 16 BRPM | DIASTOLIC BLOOD PRESSURE: 82 MMHG | WEIGHT: 252 LBS | OXYGEN SATURATION: 97 % | HEIGHT: 68 IN | BODY MASS INDEX: 38.19 KG/M2

## 2019-09-18 DIAGNOSIS — Z87.891 PERSONAL HISTORY OF NICOTINE DEPENDENCE: ICD-10-CM

## 2019-09-18 DIAGNOSIS — F17.210 NICOTINE DEPENDENCE, CIGARETTES, UNCOMPLICATED: ICD-10-CM

## 2019-09-18 PROCEDURE — 99205 OFFICE O/P NEW HI 60 MIN: CPT

## 2019-09-18 RX ORDER — OXYBUTYNIN CHLORIDE 2.5 MG/1
TABLET ORAL
Refills: 0 | Status: DISCONTINUED | COMMUNITY
End: 2019-09-18

## 2019-09-18 RX ORDER — CEFDINIR 300 MG/1
CAPSULE ORAL
Refills: 0 | Status: DISCONTINUED | COMMUNITY
End: 2019-09-18

## 2019-09-18 RX ORDER — NIFEDIPINE 20 MG/1
CAPSULE ORAL
Refills: 0 | Status: DISCONTINUED | COMMUNITY
End: 2019-09-18

## 2019-09-18 RX ORDER — TAMSULOSIN HYDROCHLORIDE 0.4 MG/1
0.4 CAPSULE ORAL
Refills: 0 | Status: DISCONTINUED | COMMUNITY
End: 2019-09-18

## 2019-09-18 RX ORDER — IBUPROFEN 600 MG/1
600 TABLET, FILM COATED ORAL 3 TIMES DAILY
Qty: 30 | Refills: 1 | Status: DISCONTINUED | COMMUNITY
Start: 2019-04-02 | End: 2019-09-18

## 2019-09-18 RX ORDER — ERGOCALCIFEROL 1.25 MG/1
CAPSULE ORAL
Refills: 0 | Status: DISCONTINUED | COMMUNITY
End: 2019-09-18

## 2019-09-20 ENCOUNTER — OUTPATIENT (OUTPATIENT)
Dept: OUTPATIENT SERVICES | Facility: HOSPITAL | Age: 44
LOS: 1 days | End: 2019-09-20
Payer: MEDICAID

## 2019-09-20 VITALS
TEMPERATURE: 98 F | OXYGEN SATURATION: 99 % | RESPIRATION RATE: 17 BRPM | HEIGHT: 67 IN | SYSTOLIC BLOOD PRESSURE: 129 MMHG | HEART RATE: 99 BPM | DIASTOLIC BLOOD PRESSURE: 92 MMHG | WEIGHT: 246.04 LBS

## 2019-09-20 DIAGNOSIS — Z01.818 ENCOUNTER FOR OTHER PREPROCEDURAL EXAMINATION: ICD-10-CM

## 2019-09-20 DIAGNOSIS — Z98.890 OTHER SPECIFIED POSTPROCEDURAL STATES: Chronic | ICD-10-CM

## 2019-09-20 DIAGNOSIS — N20.0 CALCULUS OF KIDNEY: ICD-10-CM

## 2019-09-20 NOTE — H&P PST ADULT - NSICDXPASTSURGICALHX_GEN_ALL_CORE_FT
PAST SURGICAL HISTORY:  H/O cystoscopy multiple    H/O parathyroidectomy 5/2019    H/O right knee surgery 1997    History of urethral stent x2, S/P nephrostolithotomy  ***Most recently stents  placed Memorial Day Weekend 2019  done at [facility]

## 2019-09-20 NOTE — H&P PST ADULT - NSICDXPASTMEDICALHX_GEN_ALL_CORE_FT
PAST MEDICAL HISTORY:  H/O urinary frequency     Hematuria     History of dysuria     Hyperparathyroidism     Kidney stones     Obesity

## 2019-09-20 NOTE — H&P PST ADULT - NSICDXPROBLEM_GEN_ALL_CORE_FT
PROBLEM DIAGNOSES  Problem: Calculus of kidney  Assessment and Plan: cystoscopy  right retrograde  right percutaneous stone removal

## 2019-09-20 NOTE — H&P PST ADULT - HISTORY OF PRESENT ILLNESS
43 year old male with hx recurrent kidney stones. Now with pain on urination, blood in urine, frequency urgency work up dx with stones. Per pt had stents placed Memorial Day weekend 2019 at [facility]. Now for cystoscopy right retrograde right percutaneous stone removal. 43 year old male with hx recurrent kidney stones. Now with pain on urination, blood in urine, frequency urgency work up dx with stones. Per pt had bilateral stents placed Memorial Day weekend 2019 at [facility]. Now for cystoscopy right retrograde right percutaneous stone removal, and left ureteroscopy with laser/stone removal, stent exchange/possible nephrostomy/NU catheter placement

## 2019-09-23 NOTE — PHYSICAL EXAM
[General Appearance - Alert] : alert [General Appearance - In No Acute Distress] : in no acute distress [Sclera] : the sclera and conjunctiva were normal [Outer Ear] : the ears and nose were normal in appearance [Neck Appearance] : the appearance of the neck was normal [Hearing Threshold Finger Rub Not [place]] : hearing was normal [] : no respiratory distress [Auscultation Breath Sounds / Voice Sounds] : lungs were clear to auscultation bilaterally [Respiration, Rhythm And Depth] : normal respiratory rhythm and effort [Heart Sounds] : normal S1 and S2 [Murmurs] : no murmurs [Abdomen Soft] : soft [Examination Of The Chest] : the chest was normal in appearance [Cervical Lymph Nodes Enlarged Posterior Bilaterally] : posterior cervical [Abdomen Tenderness] : non-tender [Cervical Lymph Nodes Enlarged Anterior Bilaterally] : anterior cervical [Abnormal Walk] : normal gait [Supraclavicular Lymph Nodes Enlarged Bilaterally] : supraclavicular [Skin Turgor] : normal skin turgor [Skin Color & Pigmentation] : normal skin color and pigmentation [Oriented To Time, Place, And Person] : oriented to person, place, and time [No Focal Deficits] : no focal deficits [Mood] : the mood was normal [Affect] : the affect was normal [FreeTextEntry1] : healed incision s/p parathyroidectomy

## 2019-09-23 NOTE — ASSESSMENT
[FreeTextEntry1] : 43 year old male, evaluation of anterior mediastinal nodule, ref: Dr. Moore. History of recurrent kidney stones and hyperparathyroidism s/p parathyroidectomy on 5/13/19 with recurrent hypercalcemia and recurrent stones.  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  Flexible Bronchoscopy, Cervical Mediastinoscopy, Resection of Anterior Mediastinal Nodule with Geoff Retractor, Possible Right VATS, Possible Thymectomy\par 2.  The risks, benefits, and alternatives to the procedure were discussed with the patient at length. He verbalized understanding and is in agreement with the above treatment plan.\par 3.  Prior to the above procedure, I have asked him to obtain cardiac clearance.\par \par \par Written by Miranda Ramsey NP, acting as a scribe for Jv Aguirre MD.\par \par The documentation recorded by the scribe accurately reflects the service I personally performed and the decisions made by me. JV AGUIRRE MD

## 2019-09-23 NOTE — CONSULT LETTER
[Consult Letter:] : I had the pleasure of evaluating your patient, [unfilled]. [Please see my note below.] : Please see my note below. [Consult Closing:] : Thank you very much for allowing me to participate in the care of this patient.  If you have any questions, please do not hesitate to contact me. [Sincerely,] : Sincerely, [FreeTextEntry2] : Dr. Torito Moore (Surg/ref)\par Dr. Korin Sapp (PCP)\par Dr. David Hoenig (UroSurg)\par Dr. Travis Root (Uro)\par  [FreeTextEntry3] : \par \par \par Jv Martinez MD, FACS \par Chief, Division of Thoracic Surgery \par Director, Minimally Invasive Thoracic Surgery \par Department of Cardiovascular and Thoracic Surgery \par [facility] \par , Cardiovascular and Thoracic Surgery

## 2019-09-26 ENCOUNTER — TRANSCRIPTION ENCOUNTER (OUTPATIENT)
Age: 44
End: 2019-09-26

## 2019-09-27 ENCOUNTER — APPOINTMENT (OUTPATIENT)
Dept: UROLOGY | Facility: HOSPITAL | Age: 44
End: 2019-09-27

## 2019-09-27 ENCOUNTER — INPATIENT (INPATIENT)
Facility: HOSPITAL | Age: 44
LOS: 1 days | Discharge: HOME | DRG: 661 | End: 2019-09-29
Attending: UROLOGY | Admitting: UROLOGY
Payer: MEDICAID

## 2019-09-27 ENCOUNTER — RESULT REVIEW (OUTPATIENT)
Age: 44
End: 2019-09-27

## 2019-09-27 VITALS
OXYGEN SATURATION: 100 % | DIASTOLIC BLOOD PRESSURE: 76 MMHG | WEIGHT: 246.04 LBS | RESPIRATION RATE: 18 BRPM | HEART RATE: 90 BPM | SYSTOLIC BLOOD PRESSURE: 107 MMHG | HEIGHT: 67 IN | TEMPERATURE: 98 F

## 2019-09-27 DIAGNOSIS — Z01.818 ENCOUNTER FOR OTHER PREPROCEDURAL EXAMINATION: ICD-10-CM

## 2019-09-27 DIAGNOSIS — E21.3 HYPERPARATHYROIDISM, UNSPECIFIED: ICD-10-CM

## 2019-09-27 DIAGNOSIS — N13.2 HYDRONEPHROSIS WITH RENAL AND URETERAL CALCULOUS OBSTRUCTION: ICD-10-CM

## 2019-09-27 DIAGNOSIS — Z98.890 OTHER SPECIFIED POSTPROCEDURAL STATES: Chronic | ICD-10-CM

## 2019-09-27 DIAGNOSIS — N20.0 CALCULUS OF KIDNEY: ICD-10-CM

## 2019-09-27 DIAGNOSIS — S05.02XA INJURY OF CONJUNCTIVA AND CORNEAL ABRASION WITHOUT FOREIGN BODY, LEFT EYE, INITIAL ENCOUNTER: ICD-10-CM
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
## 2019-09-27 LAB
ANION GAP SERPL CALC-SCNC: 13 MMOL/L — SIGNIFICANT CHANGE UP (ref 5–17)
BASOPHILS # BLD AUTO: 0 K/UL — SIGNIFICANT CHANGE UP (ref 0–0.2)
BASOPHILS NFR BLD AUTO: 0.2 % — SIGNIFICANT CHANGE UP (ref 0–2)
BLD GP AB SCN SERPL QL: NEGATIVE — SIGNIFICANT CHANGE UP
BUN SERPL-MCNC: 18 MG/DL — SIGNIFICANT CHANGE UP (ref 7–23)
CALCIUM SERPL-MCNC: 10 MG/DL — SIGNIFICANT CHANGE UP (ref 8.4–10.5)
CHLORIDE SERPL-SCNC: 106 MMOL/L — SIGNIFICANT CHANGE UP (ref 96–108)
CO2 SERPL-SCNC: 19 MMOL/L — LOW (ref 22–31)
CREAT SERPL-MCNC: 1.4 MG/DL — HIGH (ref 0.5–1.3)
EOSINOPHIL # BLD AUTO: 0.2 K/UL — SIGNIFICANT CHANGE UP (ref 0–0.5)
EOSINOPHIL NFR BLD AUTO: 2.6 % — SIGNIFICANT CHANGE UP (ref 0–6)
GLUCOSE SERPL-MCNC: 125 MG/DL — HIGH (ref 70–99)
HCT VFR BLD CALC: 46.3 % — SIGNIFICANT CHANGE UP (ref 39–50)
HGB BLD-MCNC: 14.7 G/DL — SIGNIFICANT CHANGE UP (ref 13–17)
LYMPHOCYTES # BLD AUTO: 1.7 K/UL — SIGNIFICANT CHANGE UP (ref 1–3.3)
LYMPHOCYTES # BLD AUTO: 18.3 % — SIGNIFICANT CHANGE UP (ref 13–44)
MCHC RBC-ENTMCNC: 29.1 PG — SIGNIFICANT CHANGE UP (ref 27–34)
MCHC RBC-ENTMCNC: 31.7 GM/DL — LOW (ref 32–36)
MCV RBC AUTO: 91.9 FL — SIGNIFICANT CHANGE UP (ref 80–100)
MONOCYTES # BLD AUTO: 0.4 K/UL — SIGNIFICANT CHANGE UP (ref 0–0.9)
MONOCYTES NFR BLD AUTO: 4.4 % — SIGNIFICANT CHANGE UP (ref 2–14)
NEUTROPHILS # BLD AUTO: 6.8 K/UL — SIGNIFICANT CHANGE UP (ref 1.8–7.4)
NEUTROPHILS NFR BLD AUTO: 74.5 % — SIGNIFICANT CHANGE UP (ref 43–77)
PLATELET # BLD AUTO: 176 K/UL — SIGNIFICANT CHANGE UP (ref 150–400)
POTASSIUM SERPL-MCNC: 4.5 MMOL/L — SIGNIFICANT CHANGE UP (ref 3.5–5.3)
POTASSIUM SERPL-SCNC: 4.5 MMOL/L — SIGNIFICANT CHANGE UP (ref 3.5–5.3)
RBC # BLD: 5.04 M/UL — SIGNIFICANT CHANGE UP (ref 4.2–5.8)
RBC # FLD: 13.6 % — SIGNIFICANT CHANGE UP (ref 10.3–14.5)
RH IG SCN BLD-IMP: POSITIVE — SIGNIFICANT CHANGE UP
SODIUM SERPL-SCNC: 138 MMOL/L — SIGNIFICANT CHANGE UP (ref 135–145)
WBC # BLD: 9.1 K/UL — SIGNIFICANT CHANGE UP (ref 3.8–10.5)
WBC # FLD AUTO: 9.1 K/UL — SIGNIFICANT CHANGE UP (ref 3.8–10.5)

## 2019-09-27 PROCEDURE — 87086 URINE CULTURE/COLONY COUNT: CPT

## 2019-09-27 PROCEDURE — 88300 SURGICAL PATH GROSS: CPT | Mod: 26

## 2019-09-27 PROCEDURE — 76000 FLUOROSCOPY <1 HR PHYS/QHP: CPT

## 2019-09-27 PROCEDURE — G0463: CPT

## 2019-09-27 RX ORDER — HYDROMORPHONE HYDROCHLORIDE 2 MG/ML
0.25 INJECTION INTRAMUSCULAR; INTRAVENOUS; SUBCUTANEOUS
Refills: 0 | Status: DISCONTINUED | OUTPATIENT
Start: 2019-09-27 | End: 2019-09-27

## 2019-09-27 RX ORDER — CEFAZOLIN SODIUM 1 G
2000 VIAL (EA) INJECTION EVERY 8 HOURS
Refills: 0 | Status: DISCONTINUED | OUTPATIENT
Start: 2019-09-27 | End: 2019-09-29

## 2019-09-27 RX ORDER — ACETAMINOPHEN 500 MG
975 TABLET ORAL EVERY 6 HOURS
Refills: 0 | Status: DISCONTINUED | OUTPATIENT
Start: 2019-09-27 | End: 2019-09-29

## 2019-09-27 RX ORDER — ONDANSETRON 8 MG/1
4 TABLET, FILM COATED ORAL ONCE
Refills: 0 | Status: DISCONTINUED | OUTPATIENT
Start: 2019-09-27 | End: 2019-09-27

## 2019-09-27 RX ORDER — OXYCODONE HYDROCHLORIDE 5 MG/1
5 TABLET ORAL EVERY 4 HOURS
Refills: 0 | Status: DISCONTINUED | OUTPATIENT
Start: 2019-09-27 | End: 2019-09-29

## 2019-09-27 RX ORDER — SODIUM CHLORIDE 9 MG/ML
3 INJECTION INTRAMUSCULAR; INTRAVENOUS; SUBCUTANEOUS EVERY 8 HOURS
Refills: 0 | Status: DISCONTINUED | OUTPATIENT
Start: 2019-09-27 | End: 2019-09-27

## 2019-09-27 RX ORDER — FENTANYL CITRATE 50 UG/ML
25 INJECTION INTRAVENOUS
Refills: 0 | Status: DISCONTINUED | OUTPATIENT
Start: 2019-09-27 | End: 2019-09-27

## 2019-09-27 RX ORDER — INFLUENZA VIRUS VACCINE 15; 15; 15; 15 UG/.5ML; UG/.5ML; UG/.5ML; UG/.5ML
0.5 SUSPENSION INTRAMUSCULAR ONCE
Refills: 0 | Status: DISCONTINUED | OUTPATIENT
Start: 2019-09-27 | End: 2019-09-29

## 2019-09-27 RX ORDER — DOCUSATE SODIUM 100 MG
100 CAPSULE ORAL THREE TIMES A DAY
Refills: 0 | Status: DISCONTINUED | OUTPATIENT
Start: 2019-09-27 | End: 2019-09-29

## 2019-09-27 RX ORDER — LIDOCAINE 4 G/100G
1 CREAM TOPICAL EVERY 6 HOURS
Refills: 0 | Status: DISCONTINUED | OUTPATIENT
Start: 2019-09-27 | End: 2019-09-29

## 2019-09-27 RX ORDER — HEPARIN SODIUM 5000 [USP'U]/ML
5000 INJECTION INTRAVENOUS; SUBCUTANEOUS EVERY 8 HOURS
Refills: 0 | Status: DISCONTINUED | OUTPATIENT
Start: 2019-09-27 | End: 2019-09-29

## 2019-09-27 RX ORDER — SENNA PLUS 8.6 MG/1
2 TABLET ORAL AT BEDTIME
Refills: 0 | Status: DISCONTINUED | OUTPATIENT
Start: 2019-09-27 | End: 2019-09-29

## 2019-09-27 RX ORDER — LIDOCAINE HCL 20 MG/ML
0.2 VIAL (ML) INJECTION ONCE
Refills: 0 | Status: DISCONTINUED | OUTPATIENT
Start: 2019-09-27 | End: 2019-09-27

## 2019-09-27 RX ORDER — SODIUM CHLORIDE 9 MG/ML
1000 INJECTION INTRAMUSCULAR; INTRAVENOUS; SUBCUTANEOUS
Refills: 0 | Status: DISCONTINUED | OUTPATIENT
Start: 2019-09-27 | End: 2019-09-29

## 2019-09-27 RX ORDER — OXYCODONE HYDROCHLORIDE 5 MG/1
10 TABLET ORAL EVERY 4 HOURS
Refills: 0 | Status: DISCONTINUED | OUTPATIENT
Start: 2019-09-27 | End: 2019-09-29

## 2019-09-27 RX ORDER — OXYCODONE HYDROCHLORIDE 5 MG/1
2.5 TABLET ORAL EVERY 4 HOURS
Refills: 0 | Status: DISCONTINUED | OUTPATIENT
Start: 2019-09-27 | End: 2019-09-29

## 2019-09-27 RX ORDER — POLYETHYLENE GLYCOL 3350 17 G/17G
17 POWDER, FOR SOLUTION ORAL DAILY
Refills: 0 | Status: DISCONTINUED | OUTPATIENT
Start: 2019-09-27 | End: 2019-09-29

## 2019-09-27 RX ORDER — CEFAZOLIN SODIUM 1 G
2000 VIAL (EA) INJECTION ONCE
Refills: 0 | Status: DISCONTINUED | OUTPATIENT
Start: 2019-09-27 | End: 2019-09-27

## 2019-09-27 RX ADMIN — OXYCODONE HYDROCHLORIDE 10 MILLIGRAM(S): 5 TABLET ORAL at 17:10

## 2019-09-27 RX ADMIN — SENNA PLUS 2 TABLET(S): 8.6 TABLET ORAL at 22:36

## 2019-09-27 RX ADMIN — HEPARIN SODIUM 5000 UNIT(S): 5000 INJECTION INTRAVENOUS; SUBCUTANEOUS at 22:36

## 2019-09-27 RX ADMIN — OXYCODONE HYDROCHLORIDE 10 MILLIGRAM(S): 5 TABLET ORAL at 20:24

## 2019-09-27 RX ADMIN — Medication 100 MILLIGRAM(S): at 22:36

## 2019-09-27 RX ADMIN — LIDOCAINE 1 APPLICATION(S): 4 CREAM TOPICAL at 22:36

## 2019-09-27 RX ADMIN — HEPARIN SODIUM 5000 UNIT(S): 5000 INJECTION INTRAVENOUS; SUBCUTANEOUS at 18:09

## 2019-09-27 RX ADMIN — Medication 100 MILLIGRAM(S): at 17:28

## 2019-09-27 RX ADMIN — Medication 975 MILLIGRAM(S): at 17:28

## 2019-09-27 RX ADMIN — HYDROMORPHONE HYDROCHLORIDE 0.25 MILLIGRAM(S): 2 INJECTION INTRAMUSCULAR; INTRAVENOUS; SUBCUTANEOUS at 14:30

## 2019-09-27 RX ADMIN — OXYCODONE HYDROCHLORIDE 10 MILLIGRAM(S): 5 TABLET ORAL at 19:54

## 2019-09-27 RX ADMIN — HYDROMORPHONE HYDROCHLORIDE 0.25 MILLIGRAM(S): 2 INJECTION INTRAMUSCULAR; INTRAVENOUS; SUBCUTANEOUS at 14:15

## 2019-09-27 RX ADMIN — HYDROMORPHONE HYDROCHLORIDE 0.25 MILLIGRAM(S): 2 INJECTION INTRAMUSCULAR; INTRAVENOUS; SUBCUTANEOUS at 13:57

## 2019-09-27 RX ADMIN — OXYCODONE HYDROCHLORIDE 10 MILLIGRAM(S): 5 TABLET ORAL at 16:40

## 2019-09-27 RX ADMIN — Medication 975 MILLIGRAM(S): at 23:05

## 2019-09-27 RX ADMIN — Medication 975 MILLIGRAM(S): at 22:35

## 2019-09-27 NOTE — CHART NOTE - NSCHARTNOTEFT_GEN_A_CORE
The patient underwent PCNL which required nephrostomy tube insertion to control bleeding. Patient will require inpatient admission for monitoring, and to undergo second stage PCNL this admission (Z91.89)

## 2019-09-27 NOTE — PROGRESS NOTE ADULT - SUBJECTIVE AND OBJECTIVE BOX
The patient was seen and examined at bedside.  Patient is complaining of feeling burning in the penis.  Denies complaints of chest pain, shortness of breath, nausea, acute pain.    T(C): 36.4 (09-27-19 @ 16:00), Max: 36.5 (09-27-19 @ 08:52)  HR: 75 (09-27-19 @ 16:00) (74 - 90)  BP: 105/65 (09-27-19 @ 16:00) (105/65 - 119/65)  RR: 16 (09-27-19 @ 16:00) (15 - 18)  SpO2: 99% (09-27-19 @ 16:00) (95% - 100%)  Wt(kg): --    Physical Exam:    General: NAD, A+Ox3  Abdomen: soft, non-tender, non-distended  Back: no hematoma, dressing clean/dry/intact      09-27 @ 07:01  -  09-27 @ 17:30  --------------------------------------------------------  IN: 500 mL / OUT: 650 mL / NET: -150 mL    F - 350cc clear    R NT - 300cc red                          14.7   9.1   )-----------( 176             46.3       138  |  106  |  18  ----------------------------<  125<H>  4.5   |  19<L>  |  1.40<H>    Ca    10.0

## 2019-09-27 NOTE — PROGRESS NOTE ADULT - ASSESSMENT
43 year old male s/p R PCNL, L URS, laser lithotripsy and left stent    -analgesia as needed  -monitor I's and O's  -regular diet  -OOB/DVT prophylaxis  -incentive spirometry  -AM labs, AM TOV

## 2019-09-28 ENCOUNTER — TRANSCRIPTION ENCOUNTER (OUTPATIENT)
Age: 44
End: 2019-09-28

## 2019-09-28 PROBLEM — Z87.898 PERSONAL HISTORY OF OTHER SPECIFIED CONDITIONS: Chronic | Status: ACTIVE | Noted: 2019-09-20

## 2019-09-28 PROBLEM — R31.9 HEMATURIA, UNSPECIFIED: Chronic | Status: ACTIVE | Noted: 2019-09-20

## 2019-09-28 LAB
ANION GAP SERPL CALC-SCNC: 8 MMOL/L — SIGNIFICANT CHANGE UP (ref 5–17)
BASOPHILS # BLD AUTO: 0 K/UL — SIGNIFICANT CHANGE UP (ref 0–0.2)
BASOPHILS NFR BLD AUTO: 0.1 % — SIGNIFICANT CHANGE UP (ref 0–2)
BUN SERPL-MCNC: 16 MG/DL — SIGNIFICANT CHANGE UP (ref 7–23)
CALCIUM SERPL-MCNC: 9.7 MG/DL — SIGNIFICANT CHANGE UP (ref 8.4–10.5)
CHLORIDE SERPL-SCNC: 111 MMOL/L — HIGH (ref 96–108)
CO2 SERPL-SCNC: 20 MMOL/L — LOW (ref 22–31)
CREAT SERPL-MCNC: 1.17 MG/DL — SIGNIFICANT CHANGE UP (ref 0.5–1.3)
EOSINOPHIL # BLD AUTO: 0.1 K/UL — SIGNIFICANT CHANGE UP (ref 0–0.5)
EOSINOPHIL NFR BLD AUTO: 1.1 % — SIGNIFICANT CHANGE UP (ref 0–6)
GLUCOSE SERPL-MCNC: 178 MG/DL — HIGH (ref 70–99)
HCT VFR BLD CALC: 42.4 % — SIGNIFICANT CHANGE UP (ref 39–50)
HGB BLD-MCNC: 13.7 G/DL — SIGNIFICANT CHANGE UP (ref 13–17)
LYMPHOCYTES # BLD AUTO: 1.6 K/UL — SIGNIFICANT CHANGE UP (ref 1–3.3)
LYMPHOCYTES # BLD AUTO: 12.2 % — LOW (ref 13–44)
MCHC RBC-ENTMCNC: 29.7 PG — SIGNIFICANT CHANGE UP (ref 27–34)
MCHC RBC-ENTMCNC: 32.4 GM/DL — SIGNIFICANT CHANGE UP (ref 32–36)
MCV RBC AUTO: 91.9 FL — SIGNIFICANT CHANGE UP (ref 80–100)
MONOCYTES # BLD AUTO: 1.1 K/UL — HIGH (ref 0–0.9)
MONOCYTES NFR BLD AUTO: 8.2 % — SIGNIFICANT CHANGE UP (ref 2–14)
NEUTROPHILS # BLD AUTO: 10.2 K/UL — HIGH (ref 1.8–7.4)
NEUTROPHILS NFR BLD AUTO: 78.3 % — HIGH (ref 43–77)
PLATELET # BLD AUTO: 186 K/UL — SIGNIFICANT CHANGE UP (ref 150–400)
POTASSIUM SERPL-MCNC: 4.3 MMOL/L — SIGNIFICANT CHANGE UP (ref 3.5–5.3)
POTASSIUM SERPL-SCNC: 4.3 MMOL/L — SIGNIFICANT CHANGE UP (ref 3.5–5.3)
RBC # BLD: 4.62 M/UL — SIGNIFICANT CHANGE UP (ref 4.2–5.8)
RBC # FLD: 13.7 % — SIGNIFICANT CHANGE UP (ref 10.3–14.5)
SODIUM SERPL-SCNC: 139 MMOL/L — SIGNIFICANT CHANGE UP (ref 135–145)
WBC # BLD: 13.1 K/UL — HIGH (ref 3.8–10.5)
WBC # FLD AUTO: 13.1 K/UL — HIGH (ref 3.8–10.5)

## 2019-09-28 RX ADMIN — HEPARIN SODIUM 5000 UNIT(S): 5000 INJECTION INTRAVENOUS; SUBCUTANEOUS at 22:26

## 2019-09-28 RX ADMIN — OXYCODONE HYDROCHLORIDE 10 MILLIGRAM(S): 5 TABLET ORAL at 11:57

## 2019-09-28 RX ADMIN — Medication 100 MILLIGRAM(S): at 22:26

## 2019-09-28 RX ADMIN — Medication 100 MILLIGRAM(S): at 06:03

## 2019-09-28 RX ADMIN — OXYCODONE HYDROCHLORIDE 10 MILLIGRAM(S): 5 TABLET ORAL at 12:35

## 2019-09-28 RX ADMIN — Medication 975 MILLIGRAM(S): at 17:52

## 2019-09-28 RX ADMIN — Medication 975 MILLIGRAM(S): at 17:07

## 2019-09-28 RX ADMIN — Medication 975 MILLIGRAM(S): at 22:26

## 2019-09-28 RX ADMIN — SENNA PLUS 2 TABLET(S): 8.6 TABLET ORAL at 22:26

## 2019-09-28 RX ADMIN — POLYETHYLENE GLYCOL 3350 17 GRAM(S): 17 POWDER, FOR SOLUTION ORAL at 11:55

## 2019-09-28 RX ADMIN — Medication 975 MILLIGRAM(S): at 11:57

## 2019-09-28 RX ADMIN — OXYCODONE HYDROCHLORIDE 10 MILLIGRAM(S): 5 TABLET ORAL at 22:56

## 2019-09-28 RX ADMIN — Medication 975 MILLIGRAM(S): at 06:03

## 2019-09-28 RX ADMIN — Medication 975 MILLIGRAM(S): at 12:35

## 2019-09-28 RX ADMIN — OXYCODONE HYDROCHLORIDE 10 MILLIGRAM(S): 5 TABLET ORAL at 17:51

## 2019-09-28 RX ADMIN — HEPARIN SODIUM 5000 UNIT(S): 5000 INJECTION INTRAVENOUS; SUBCUTANEOUS at 13:40

## 2019-09-28 RX ADMIN — HEPARIN SODIUM 5000 UNIT(S): 5000 INJECTION INTRAVENOUS; SUBCUTANEOUS at 06:03

## 2019-09-28 RX ADMIN — Medication 100 MILLIGRAM(S): at 13:40

## 2019-09-28 RX ADMIN — Medication 100 MILLIGRAM(S): at 13:39

## 2019-09-28 RX ADMIN — Medication 975 MILLIGRAM(S): at 22:56

## 2019-09-28 RX ADMIN — OXYCODONE HYDROCHLORIDE 10 MILLIGRAM(S): 5 TABLET ORAL at 22:26

## 2019-09-28 RX ADMIN — OXYCODONE HYDROCHLORIDE 10 MILLIGRAM(S): 5 TABLET ORAL at 17:07

## 2019-09-28 NOTE — PROGRESS NOTE ADULT - ASSESSMENT
43 year old male s/p R PCNL, L URS, laser lithotripsy and left stent 9/27    -TOV/PVR  -analgesia as needed  -monitor I's and O's  -regular diet  -OOB/DVT prophylaxis  -incentive spirometry  -AM labs  -Plan for second stage PCNL tomorrow

## 2019-09-28 NOTE — PROGRESS NOTE ADULT - SUBJECTIVE AND OBJECTIVE BOX
UROLOGY DAILY PROGRESS NOTE:     Subjective:    Patient seen and examined. Complaining of left stent colic. Pain controlled.    Objective:    NAD, awake and alert  Respirations nonlabored  Abdomen soft, nontender, nondistended  Burkett draining clear urine --> removed  NT draining clear red, dressing mildly saturated    MEDICATIONS  (STANDING):  acetaminophen   Tablet .. 975 milliGRAM(s) Oral every 6 hours  ceFAZolin   IVPB 2000 milliGRAM(s) IV Intermittent every 8 hours  docusate sodium 100 milliGRAM(s) Oral three times a day  heparin  Injectable 5000 Unit(s) SubCutaneous every 8 hours  influenza   Vaccine 0.5 milliLiter(s) IntraMuscular once  polyethylene glycol 3350 17 Gram(s) Oral daily  senna 2 Tablet(s) Oral at bedtime  sodium chloride 0.9%. 1000 milliLiter(s) (125 mL/Hr) IV Continuous <Continuous>    MEDICATIONS  (PRN):  lidocaine 2% Gel 1 Application(s) Topical every 6 hours PRN pain around burkett catheter  oxyCODONE    IR 10 milliGRAM(s) Oral every 4 hours PRN Severe Pain (7 - 10)  oxyCODONE    IR 5 milliGRAM(s) Oral every 4 hours PRN Moderate Pain (4 - 6)  oxyCODONE    IR 2.5 milliGRAM(s) Oral every 4 hours PRN Mild Pain (1 - 3)      Vital Signs Last 24 Hrs  T(C): 36.6 (28 Sep 2019 05:07), Max: 36.8 (27 Sep 2019 18:05)  T(F): 97.8 (28 Sep 2019 05:07), Max: 98.3 (27 Sep 2019 18:05)  HR: 74 (28 Sep 2019 05:07) (74 - 92)  BP: 102/68 (28 Sep 2019 05:07) (102/68 - 121/78)  BP(mean): 80 (27 Sep 2019 16:00) (80 - 94)  RR: 18 (28 Sep 2019 05:07) (15 - 18)  SpO2: 94% (28 Sep 2019 05:07) (94% - 100%)    I&O's Detail    27 Sep 2019 07:01  -  28 Sep 2019 07:00  --------------------------------------------------------  IN:    IV PiggyBack: 150 mL    Oral Fluid: 1200 mL    sodium chloride 0.9%.: 2000 mL  Total IN: 3350 mL    OUT:    Indwelling Catheter - Urethral: 900 mL    Nephrostomy Tube: 1050 mL  Total OUT: 1950 mL    Total NET: 1400 mL          Daily Height in cm: 170.18 (27 Sep 2019 10:11)    Daily     LABS:                        14.7   9.1   )-----------( 176      ( 27 Sep 2019 13:54 )             46.3     09-27    138  |  106  |  18  ----------------------------<  125<H>  4.5   |  19<L>  |  1.40<H>    Ca    10.0      27 Sep 2019 13:54

## 2019-09-29 ENCOUNTER — TRANSCRIPTION ENCOUNTER (OUTPATIENT)
Age: 44
End: 2019-09-29

## 2019-09-29 ENCOUNTER — RESULT REVIEW (OUTPATIENT)
Age: 44
End: 2019-09-29

## 2019-09-29 VITALS
OXYGEN SATURATION: 97 % | RESPIRATION RATE: 17 BRPM | DIASTOLIC BLOOD PRESSURE: 93 MMHG | TEMPERATURE: 98 F | SYSTOLIC BLOOD PRESSURE: 140 MMHG | HEART RATE: 93 BPM

## 2019-09-29 DIAGNOSIS — N20.0 CALCULUS OF KIDNEY: ICD-10-CM

## 2019-09-29 DIAGNOSIS — S05.00XA INJURY OF CONJUNCTIVA AND CORNEAL ABRASION WITHOUT FOREIGN BODY, UNSPECIFIED EYE, INITIAL ENCOUNTER: ICD-10-CM

## 2019-09-29 LAB
ANION GAP SERPL CALC-SCNC: 5 MMOL/L — SIGNIFICANT CHANGE UP (ref 5–17)
BUN SERPL-MCNC: 12 MG/DL — SIGNIFICANT CHANGE UP (ref 7–23)
CALCIUM SERPL-MCNC: 9.5 MG/DL — SIGNIFICANT CHANGE UP (ref 8.4–10.5)
CHLORIDE SERPL-SCNC: 116 MMOL/L — HIGH (ref 96–108)
CO2 SERPL-SCNC: 22 MMOL/L — SIGNIFICANT CHANGE UP (ref 22–31)
CREAT SERPL-MCNC: 0.97 MG/DL — SIGNIFICANT CHANGE UP (ref 0.5–1.3)
GLUCOSE SERPL-MCNC: 118 MG/DL — HIGH (ref 70–99)
HCT VFR BLD CALC: 40.5 % — SIGNIFICANT CHANGE UP (ref 39–50)
HGB BLD-MCNC: 13.7 G/DL — SIGNIFICANT CHANGE UP (ref 13–17)
MCHC RBC-ENTMCNC: 31.1 PG — SIGNIFICANT CHANGE UP (ref 27–34)
MCHC RBC-ENTMCNC: 33.7 GM/DL — SIGNIFICANT CHANGE UP (ref 32–36)
MCV RBC AUTO: 92.3 FL — SIGNIFICANT CHANGE UP (ref 80–100)
PLATELET # BLD AUTO: 164 K/UL — SIGNIFICANT CHANGE UP (ref 150–400)
POTASSIUM SERPL-MCNC: 4.1 MMOL/L — SIGNIFICANT CHANGE UP (ref 3.5–5.3)
POTASSIUM SERPL-SCNC: 4.1 MMOL/L — SIGNIFICANT CHANGE UP (ref 3.5–5.3)
RBC # BLD: 4.39 M/UL — SIGNIFICANT CHANGE UP (ref 4.2–5.8)
RBC # FLD: 13.7 % — SIGNIFICANT CHANGE UP (ref 10.3–14.5)
SODIUM SERPL-SCNC: 143 MMOL/L — SIGNIFICANT CHANGE UP (ref 135–145)
WBC # BLD: 9.1 K/UL — SIGNIFICANT CHANGE UP (ref 3.8–10.5)
WBC # FLD AUTO: 9.1 K/UL — SIGNIFICANT CHANGE UP (ref 3.8–10.5)

## 2019-09-29 PROCEDURE — 50430 NJX PX NFROSGRM &/URTRGRM: CPT | Mod: 59,RT

## 2019-09-29 PROCEDURE — 50431 NJX PX NFROSGRM &/URTRGRM: CPT | Mod: 58,59,RT

## 2019-09-29 PROCEDURE — 50561 KIDNEY ENDOSCOPY & TREATMENT: CPT | Mod: 58

## 2019-09-29 PROCEDURE — 88300 SURGICAL PATH GROSS: CPT | Mod: 26

## 2019-09-29 PROCEDURE — 52353 CYSTOURETERO W/LITHOTRIPSY: CPT | Mod: 50,58,59

## 2019-09-29 RX ORDER — SODIUM CHLORIDE 9 MG/ML
1000 INJECTION INTRAMUSCULAR; INTRAVENOUS; SUBCUTANEOUS
Refills: 0 | Status: DISCONTINUED | OUTPATIENT
Start: 2019-09-29 | End: 2019-09-29

## 2019-09-29 RX ORDER — CEPHALEXIN 500 MG
1 CAPSULE ORAL
Qty: 6 | Refills: 0
Start: 2019-09-29 | End: 2019-10-01

## 2019-09-29 RX ORDER — CEFAZOLIN SODIUM 1 G
2000 VIAL (EA) INJECTION EVERY 8 HOURS
Refills: 0 | Status: DISCONTINUED | OUTPATIENT
Start: 2019-09-29 | End: 2019-09-29

## 2019-09-29 RX ORDER — OXYCODONE HYDROCHLORIDE 5 MG/1
10 TABLET ORAL EVERY 4 HOURS
Refills: 0 | Status: DISCONTINUED | OUTPATIENT
Start: 2019-09-29 | End: 2019-09-29

## 2019-09-29 RX ORDER — SENNA PLUS 8.6 MG/1
2 TABLET ORAL AT BEDTIME
Refills: 0 | Status: DISCONTINUED | OUTPATIENT
Start: 2019-09-29 | End: 2019-09-29

## 2019-09-29 RX ORDER — ONDANSETRON 8 MG/1
4 TABLET, FILM COATED ORAL ONCE
Refills: 0 | Status: DISCONTINUED | OUTPATIENT
Start: 2019-09-29 | End: 2019-09-29

## 2019-09-29 RX ORDER — OXYCODONE HYDROCHLORIDE 5 MG/1
5 TABLET ORAL EVERY 4 HOURS
Refills: 0 | Status: DISCONTINUED | OUTPATIENT
Start: 2019-09-29 | End: 2019-09-29

## 2019-09-29 RX ORDER — ACETAMINOPHEN 500 MG
1000 TABLET ORAL ONCE
Refills: 0 | Status: DISCONTINUED | OUTPATIENT
Start: 2019-09-29 | End: 2019-09-29

## 2019-09-29 RX ORDER — POLYETHYLENE GLYCOL 3350 17 G/17G
17 POWDER, FOR SOLUTION ORAL DAILY
Refills: 0 | Status: DISCONTINUED | OUTPATIENT
Start: 2019-09-29 | End: 2019-09-29

## 2019-09-29 RX ORDER — SENNA PLUS 8.6 MG/1
2 TABLET ORAL
Qty: 0 | Refills: 0 | DISCHARGE
Start: 2019-09-29

## 2019-09-29 RX ORDER — DOCUSATE SODIUM 100 MG
1 CAPSULE ORAL
Qty: 0 | Refills: 0 | DISCHARGE
Start: 2019-09-29

## 2019-09-29 RX ORDER — DOCUSATE SODIUM 100 MG
100 CAPSULE ORAL THREE TIMES A DAY
Refills: 0 | Status: DISCONTINUED | OUTPATIENT
Start: 2019-09-29 | End: 2019-09-29

## 2019-09-29 RX ORDER — HYDROMORPHONE HYDROCHLORIDE 2 MG/ML
0.5 INJECTION INTRAMUSCULAR; INTRAVENOUS; SUBCUTANEOUS
Refills: 0 | Status: DISCONTINUED | OUTPATIENT
Start: 2019-09-29 | End: 2019-09-29

## 2019-09-29 RX ORDER — HEPARIN SODIUM 5000 [USP'U]/ML
5000 INJECTION INTRAVENOUS; SUBCUTANEOUS EVERY 8 HOURS
Refills: 0 | Status: DISCONTINUED | OUTPATIENT
Start: 2019-09-29 | End: 2019-09-29

## 2019-09-29 RX ORDER — TOBRAMYCIN 0.3 %
1 DROPS OPHTHALMIC (EYE) EVERY 4 HOURS
Refills: 0 | Status: DISCONTINUED | OUTPATIENT
Start: 2019-09-29 | End: 2019-09-29

## 2019-09-29 RX ORDER — TOBRAMYCIN 0.3 %
1 DROPS OPHTHALMIC (EYE)
Qty: 5 | Refills: 0
Start: 2019-09-29 | End: 2019-09-30

## 2019-09-29 RX ADMIN — Medication 1 DROP(S): at 17:29

## 2019-09-29 RX ADMIN — Medication 100 MILLIGRAM(S): at 13:17

## 2019-09-29 RX ADMIN — Medication 100 MILLIGRAM(S): at 05:16

## 2019-09-29 RX ADMIN — Medication 975 MILLIGRAM(S): at 05:16

## 2019-09-29 RX ADMIN — HEPARIN SODIUM 5000 UNIT(S): 5000 INJECTION INTRAVENOUS; SUBCUTANEOUS at 05:16

## 2019-09-29 RX ADMIN — HEPARIN SODIUM 5000 UNIT(S): 5000 INJECTION INTRAVENOUS; SUBCUTANEOUS at 13:19

## 2019-09-29 NOTE — PROGRESS NOTE ADULT - ASSESSMENT
43 year old male s/p R PCNL, L URS, laser lithotripsy and left stent 9/27    - AM Labs reviewed, stable  - NPO/IVF, plan for 2nd stage PCNL (R) today (9/29), post-op check   -analgesia as needed  -monitor I's and O's  -OOB/DVT prophylaxis  -incentive spirometry

## 2019-09-29 NOTE — DISCHARGE NOTE PROVIDER - NSDCFUADDINST_GEN_ALL_CORE_FT
You may experience drainage from your incision, you may change the gauze and secure with tape as needed. The incision will close on its own in a few days.     Call the office or go to the emergency room if you experience fever greater than 101, chills, pain not relieved by oral medication, inability to tolerate food or liquids or are unable to void. No heavy lifting greater than 10lbs, you may shower regularly but no baths, use stool softener to avoid straining and constipation.  Return to daily living activities slowly as tolerated.

## 2019-09-29 NOTE — PROGRESS NOTE ADULT - SUBJECTIVE AND OBJECTIVE BOX
Post op Check    Pt seen and examined without complaints. Pain is controlled. Denies SOB/CP/N/V. Pt c/o left eye irritation, anesthesia was called, pt given tobramycin for corneal abrasion with relief.     Vital Signs Last 24 Hrs  T(C): 36.5 (29 Sep 2019 15:51), Max: 36.7 (28 Sep 2019 21:21)  T(F): 97.7 (29 Sep 2019 15:51), Max: 98 (28 Sep 2019 21:21)  HR: 93 (29 Sep 2019 15:51) (57 - 93)  BP: 140/93 (29 Sep 2019 15:51) (106/72 - 146/95)  BP(mean): 96 (29 Sep 2019 11:45) (96 - 97)  RR: 17 (29 Sep 2019 15:51) (14 - 18)  SpO2: 97% (29 Sep 2019 15:51) (95% - 100%)    I&O's Summary    28 Sep 2019 07:01  -  29 Sep 2019 07:00  --------------------------------------------------------  IN: 3740 mL / OUT: 3250 mL / NET: 490 mL    29 Sep 2019 07:01  -  29 Sep 2019 17:12  --------------------------------------------------------  IN: 125 mL / OUT: 2150 mL / NET: -2025 mL    Physical Exam  Gen: NAD, A&Ox3  Abd: Soft, NT, ND  Back: dressings c/d/i                          13.7   9.1   )-----------( 164      ( 29 Sep 2019 05:48 )             40.5       09-29    143  |  116<H>  |  12  ----------------------------<  118<H>  4.1   |  22  |  0.97    Ca    9.5      29 Sep 2019 05:46        A/P: 43 year old male s/p second stage R PCNL, tubeless     DVT prophylaxis/OOB  Incentive spirometry  Analgesia as needed  Diet-regular  Voided with acceptable pvr    Discharge home today

## 2019-09-29 NOTE — BRIEF OPERATIVE NOTE - OPERATION/FINDINGS
Right 2nd look nephroscopy, antegrade pyelogram, stone extraction, laser lithotripsy.  +additional stick without dilation
1. Large R residual ureteral stone burden lasered and evacuated  2. No residual stones in collecting system    Dictation: [id number]
cystoscopy, b/l retrograde pyelogram, left ureteroscopy laser lithotripsy stone extract stent exchange, right PCNL >2cm, nephrostogram, right stent removal, right nephrostomy tube insertion    1:1:300

## 2019-09-29 NOTE — BRIEF OPERATIVE NOTE - NSICDXBRIEFPROCEDURE_GEN_ALL_CORE_FT
PROCEDURES:  Cystoscopy with left ureteroscopy 27-Sep-2019 13:17:34  Jack Bradley  Cystoscopy with percutaneous nephrolithotomy 27-Sep-2019 13:17:18 RIGHT Jack Bradley
PROCEDURES:  Cystoscopy with percutaneous nephrolithotomy 27-Sep-2019 13:17:18 RIGHT Jack Bradley
PROCEDURES:  Cystoscopy with left ureteroscopy 27-Sep-2019 13:17:34  Jack Bradley  Cystoscopy with percutaneous nephrolithotomy 27-Sep-2019 13:17:18 RIGHT Jack Bradley

## 2019-09-29 NOTE — DISCHARGE NOTE PROVIDER - CARE PROVIDER_API CALL
Hoenig, David M (MD)  Urology  [facility] Dept of Urology, [address] [place] [address]  Phone: [phone]  Fax: [phone]  Follow Up Time: 1 week

## 2019-09-29 NOTE — DISCHARGE NOTE PROVIDER - NSDCFUSCHEDAPPT_GEN_ALL_CORE_FT
BERNIE OLSON ; 10/03/2019 ; LUIS FLEIPE Cardio [address] BERNIE Pak ; 10/13/2019 ; BERNIE PEREZ ; 10/24/2019 ; LUIS FELIPE [facility] [address] BERNIE Pak ; 10/24/2019 ; JO ANN PreAdmits BERNIE OLSON ; 10/03/2019 ; LUIS FELIPE Cardio [address] BERNIE Pak ; 10/13/2019 ; BERNIE PEREZ ; 10/24/2019 ; LUIS FELIPE [facility] [address] BERNIE Pak ; 10/24/2019 ; JO ANN PreAdmits BERNIE OLSON ; 10/03/2019 ; LUIS FELIPE Cardio [address] BERNIE Pak ; 10/13/2019 ; BERNIE PEREZ ; 10/24/2019 ; LUIS FELIPE Thor [facility]urg [address] BERNEI Pak ; 10/24/2019 ; JO ANN PreAdmits

## 2019-09-29 NOTE — DISCHARGE NOTE PROVIDER - CARE PROVIDERS DIRECT ADDRESSES
[email or web address]

## 2019-09-29 NOTE — DISCHARGE NOTE PROVIDER - HOSPITAL COURSE
43 year old male with PMHx of hyperparathyroidism and nephrolithiasis underwent a right PCNL and left URS, laser lithotripsy and left stent on 9/27/19. Patient tolerated procedure well. Please see operative report  for further details. Pt then returned to OR for 2nd stage right PCNL on 9/29/19, nephrostomy tube and left stent was removed. Garcia was removed postop, pt voided with minimal post void residual. Upon discharge pt remained afebrile, voiding, tolerating diet, and pain well managed. Pt was discharged with keflex, percocet 5/325mg and stool softeners. He will follow up with Dr. Hoenig next week. 43 year old male with PMHx of hyperparathyroidism and nephrolithiasis underwent a right PCNL and left URS, laser lithotripsy and left stent on 9/27/19. Patient tolerated procedure well. Please see operative report  for further details. Pt then returned to OR for 2nd stage right PCNL on 9/29/19, nephrostomy tube and left stent was removed. Garcia was removed postop, pt voided with minimal post void residual. Pt had post op corneal abrasion evaluated by anesthesia and treated with tobramycin. Upon discharge pt remained afebrile, voiding, tolerating diet, and pain well managed. Pt was discharged with keflex, percocet 5/325mg and stool softeners. He will follow up with Dr. Hoenig next week.

## 2019-09-29 NOTE — BRIEF OPERATIVE NOTE - NSICDXBRIEFPOSTOP_GEN_ALL_CORE_FT
POST-OP DIAGNOSIS:  Bilateral nephrolithiasis 27-Sep-2019 13:18:08  Jack Bradley

## 2019-09-29 NOTE — DISCHARGE NOTE PROVIDER - NSDCCPCAREPLAN_GEN_ALL_CORE_FT
PRINCIPAL DISCHARGE DIAGNOSIS  Diagnosis: Nephrolithiasis  Assessment and Plan of Treatment: Resolution of stone burden PRINCIPAL DISCHARGE DIAGNOSIS  Diagnosis: Nephrolithiasis  Assessment and Plan of Treatment: Resolution of stone burden      SECONDARY DISCHARGE DIAGNOSES  Diagnosis: Corneal abrasion  Assessment and Plan of Treatment: Use Tobramycin drops as directed

## 2019-09-29 NOTE — DISCHARGE NOTE NURSING/CASE MANAGEMENT/SOCIAL WORK - PATIENT PORTAL LINK FT
You can access the FollowMyHealth Patient Portal offered by [facility] by registering at the following website: [email or web address]. By joining [employer]’s FollowMyHealth portal, you will also be able to view your health information using other applications (apps) compatible with our system.

## 2019-09-29 NOTE — BRIEF OPERATIVE NOTE - SPECIMENS
R ureteral stone for analysis
right kidney urine, right kidney stone (culture, analysis), left kidney stone, left ureteral stone
Kidney stones

## 2019-09-29 NOTE — CHART NOTE - NSCHARTNOTEFT_GEN_A_CORE
Post-operative Check    SUBJECTIVE: Patient seen for post-op check.    His burkett was removed once transferred to the floor, voided 450cc (pvr 128cc).   He endorsed L. eye pain after procedure, noted to have corneal abrasion, seen by anesthesia and given topical drops.    His back remains dry, no dysuria.      OBJECTIVE:  T(C): 36.5 (09-29-19 @ 15:51), Max: 36.7 (09-28-19 @ 21:21)  HR: 93 (09-29-19 @ 15:51) (57 - 93)  BP: 140/93 (09-29-19 @ 15:51) (106/72 - 146/95)  RR: 17 (09-29-19 @ 15:51) (14 - 18)  SpO2: 97% (09-29-19 @ 15:51) (95% - 100%)      09-28-19 @ 07:01  -  09-29-19 @ 07:00  --------------------------------------------------------  IN: 3740 mL / OUT: 3250 mL / NET: 490 mL    09-29-19 @ 07:01  -  09-29-19 @ 17:12  --------------------------------------------------------  IN: 365 mL / OUT: 2150 mL / NET: -1785 mL        Physical Exam:   - Constitutional: AOx3, NAD  - HEENT: L. eye erythema/swelling  - Respiratory: nonlabored  - Abdomen: soft, nontender, nondistended.  Flank/back incision c/d/i  - : light punch/pink, clear urine in urinal      ASSESSMENT:   BERNIE OLSON is a 43y Male POD#0 from 2nd look nephroscopy, laser lithotripsy, stent/NT removal.      PLAN:  - Pain management  - Follow UOP  - Corneal abrasion - 3x days topical drops per ansethesia  - monitor back/check urine color  - Passed trial of void (PVR 128cc)  - Likely discharge home w/ 3x days keflex

## 2019-09-29 NOTE — BRIEF OPERATIVE NOTE - NSICDXBRIEFPREOP_GEN_ALL_CORE_FT
PRE-OP DIAGNOSIS:  Bilateral nephrolithiasis 27-Sep-2019 13:17:57  Jack Bradley T

## 2019-09-29 NOTE — PROGRESS NOTE ADULT - SUBJECTIVE AND OBJECTIVE BOX
UROLOGY DAILY PROGRESS NOTE:     Subjective:    Patient seen and examined. No events overnight, remained afebrile.    Voided with +TOV after burktet removal, urinal at bedside.   Plan for 2nd stage PCNL OR today.     Objective:    NAD, awake and alert  Respirations nonlabored  Abdomen soft, nontender, nondistended  : urine clear yellow in urinal at bedside  NT draining clear red, dressing mildly saturated    Vital Signs Last 24 Hrs  T(C): 36.4 (29 Sep 2019 05:15), Max: 36.9 (28 Sep 2019 13:44)  T(F): 97.5 (29 Sep 2019 05:15), Max: 98.4 (28 Sep 2019 13:44)  HR: 68 (29 Sep 2019 05:15) (64 - 82)  BP: 124/86 (29 Sep 2019 05:15) (106/72 - 127/90)  BP(mean): --  RR: 18 (29 Sep 2019 05:15) (18 - 18)  SpO2: 96% (29 Sep 2019 05:15) (95% - 97%)    09-28-19 @ 07:01  -  09-29-19 @ 07:00  --------------------------------------------------------  IN: 3740 mL / OUT: 3250 mL / NET: 490 mL    09-29-19 @ 07:01  -  09-29-19 @ 08:28  --------------------------------------------------------  IN: 0 mL / OUT: 750 mL / NET: -750 mL      LABS:           CBC (09-29 @ 05:48)                              13.7                           9.1     )----------------(  164        --    % Neutrophils, --    % Lymphocytes, ANC: --                                  40.5                CBC (09-28 @ 07:28)                              13.7                           13.1<H>  )----------------(  186        78.3<H>% Neutrophils, 12.2<L>% Lymphocytes, ANC: 10.2<H>                              42.4                  BMP (09-29 @ 05:46)             143     |  116<H>  |  12    		Ca++ --      Ca 9.5                ---------------------------------( 118<H>		Mg --                 4.1     |  22      |  0.97  			Ph --      BMP (09-28 @ 07:28)             139     |  111<H>  |  16    		Ca++ --      Ca 9.7                ---------------------------------( 178<H>		Mg --                 4.3     |  20<L>   |  1.17  			Ph --                -> .Urine Culture (09-28 @ 02:16)     NG    NG    No growth    -> .Urine Culture (09-20 @ 21:51)     NG    NG    No growth

## 2019-09-30 LAB
CULTURE RESULTS: SIGNIFICANT CHANGE UP
SPECIMEN SOURCE: SIGNIFICANT CHANGE UP

## 2019-10-01 LAB
SURGICAL PATHOLOGY STUDY: SIGNIFICANT CHANGE UP
SURGICAL PATHOLOGY STUDY: SIGNIFICANT CHANGE UP

## 2019-10-02 LAB
-  AMIKACIN: SIGNIFICANT CHANGE UP
-  AMIKACIN: SIGNIFICANT CHANGE UP
-  AMPICILLIN/SULBACTAM: SIGNIFICANT CHANGE UP
-  AMPICILLIN: SIGNIFICANT CHANGE UP
-  AMPICILLIN: SIGNIFICANT CHANGE UP
-  AZTREONAM: SIGNIFICANT CHANGE UP
-  AZTREONAM: SIGNIFICANT CHANGE UP
-  CEFAZOLIN: SIGNIFICANT CHANGE UP
-  CEFEPIME: SIGNIFICANT CHANGE UP
-  CEFEPIME: SIGNIFICANT CHANGE UP
-  CEFOXITIN: SIGNIFICANT CHANGE UP
-  CEFTAZIDIME: SIGNIFICANT CHANGE UP
-  CEFTRIAXONE: SIGNIFICANT CHANGE UP
-  CIPROFLOXACIN: SIGNIFICANT CHANGE UP
-  ERTAPENEM: SIGNIFICANT CHANGE UP
-  GENTAMICIN: SIGNIFICANT CHANGE UP
-  GENTAMICIN: SIGNIFICANT CHANGE UP
-  IMIPENEM: SIGNIFICANT CHANGE UP
-  IMIPENEM: SIGNIFICANT CHANGE UP
-  LEVOFLOXACIN: SIGNIFICANT CHANGE UP
-  MEROPENEM: SIGNIFICANT CHANGE UP
-  MEROPENEM: SIGNIFICANT CHANGE UP
-  NITROFURANTOIN: SIGNIFICANT CHANGE UP
-  NITROFURANTOIN: SIGNIFICANT CHANGE UP
-  PIPERACILLIN/TAZOBACTAM: SIGNIFICANT CHANGE UP
-  PIPERACILLIN/TAZOBACTAM: SIGNIFICANT CHANGE UP
-  TETRACYCLINE: SIGNIFICANT CHANGE UP
-  TIGECYCLINE: SIGNIFICANT CHANGE UP
-  TOBRAMYCIN: SIGNIFICANT CHANGE UP
-  TOBRAMYCIN: SIGNIFICANT CHANGE UP
-  TRIMETHOPRIM/SULFAMETHOXAZOLE: SIGNIFICANT CHANGE UP
-  VANCOMYCIN: SIGNIFICANT CHANGE UP
CULTURE RESULTS: SIGNIFICANT CHANGE UP
METHOD TYPE: SIGNIFICANT CHANGE UP
NIDUS STONE QN: SIGNIFICANT CHANGE UP
ORGANISM # SPEC MICROSCOPIC CNT: SIGNIFICANT CHANGE UP
SPECIMEN SOURCE: SIGNIFICANT CHANGE UP

## 2019-10-03 ENCOUNTER — OUTPATIENT (OUTPATIENT)
Dept: OUTPATIENT SERVICES | Facility: HOSPITAL | Age: 44
LOS: 1 days | End: 2019-10-03

## 2019-10-03 ENCOUNTER — APPOINTMENT (OUTPATIENT)
Dept: CARDIOLOGY | Facility: CLINIC | Age: 44
End: 2019-10-03
Payer: MEDICAID

## 2019-10-03 ENCOUNTER — APPOINTMENT (OUTPATIENT)
Dept: CV DIAGNOSITCS | Facility: HOSPITAL | Age: 44
End: 2019-10-03
Payer: MEDICAID

## 2019-10-03 VITALS
OXYGEN SATURATION: 99 % | BODY MASS INDEX: 37.44 KG/M2 | DIASTOLIC BLOOD PRESSURE: 86 MMHG | RESPIRATION RATE: 16 BRPM | HEIGHT: 68 IN | WEIGHT: 247 LBS | SYSTOLIC BLOOD PRESSURE: 136 MMHG | HEART RATE: 91 BPM

## 2019-10-03 DIAGNOSIS — Z01.810 ENCOUNTER FOR PREPROCEDURAL CARDIOVASCULAR EXAMINATION: ICD-10-CM

## 2019-10-03 DIAGNOSIS — Z98.890 OTHER SPECIFIED POSTPROCEDURAL STATES: Chronic | ICD-10-CM

## 2019-10-03 DIAGNOSIS — Z13.6 ENCOUNTER FOR SCREENING FOR CARDIOVASCULAR DISORDERS: ICD-10-CM

## 2019-10-03 LAB — NIDUS STONE QN: SIGNIFICANT CHANGE UP

## 2019-10-03 PROCEDURE — 99205 OFFICE O/P NEW HI 60 MIN: CPT

## 2019-10-03 PROCEDURE — 93306 TTE W/DOPPLER COMPLETE: CPT | Mod: 26

## 2019-10-03 PROCEDURE — 93000 ELECTROCARDIOGRAM COMPLETE: CPT

## 2019-10-06 NOTE — ED PROVIDER NOTE - NS_EDPROVIDERDISPOUSERTYPE_ED_A_ED
Rest, increase fluids  Take mucinex as needed for cough  Start zpak if no improvement in 5 days  Follow up with family doctor if no improvement in 7 days 
Attending Attestation (For Attendings USE Only)...

## 2019-10-10 ENCOUNTER — OUTPATIENT (OUTPATIENT)
Dept: OUTPATIENT SERVICES | Facility: HOSPITAL | Age: 44
LOS: 1 days | End: 2019-10-10

## 2019-10-10 VITALS
WEIGHT: 244.93 LBS | DIASTOLIC BLOOD PRESSURE: 88 MMHG | SYSTOLIC BLOOD PRESSURE: 130 MMHG | OXYGEN SATURATION: 97 % | HEIGHT: 67.5 IN | HEART RATE: 97 BPM | TEMPERATURE: 97 F | RESPIRATION RATE: 16 BRPM

## 2019-10-10 DIAGNOSIS — D49.89 NEOPLASM OF UNSPECIFIED BEHAVIOR OF OTHER SPECIFIED SITES: ICD-10-CM

## 2019-10-10 DIAGNOSIS — N20.0 CALCULUS OF KIDNEY: Chronic | ICD-10-CM

## 2019-10-10 DIAGNOSIS — Z98.890 OTHER SPECIFIED POSTPROCEDURAL STATES: Chronic | ICD-10-CM

## 2019-10-10 DIAGNOSIS — Z01.818 ENCOUNTER FOR OTHER PREPROCEDURAL EXAMINATION: ICD-10-CM

## 2019-10-10 LAB
ANION GAP SERPL CALC-SCNC: 11 MMO/L — SIGNIFICANT CHANGE UP (ref 7–14)
BLD GP AB SCN SERPL QL: NEGATIVE — SIGNIFICANT CHANGE UP
BUN SERPL-MCNC: 11 MG/DL — SIGNIFICANT CHANGE UP (ref 7–23)
CALCIUM SERPL-MCNC: 10.6 MG/DL — HIGH (ref 8.4–10.5)
CHLORIDE SERPL-SCNC: 109 MMOL/L — HIGH (ref 98–107)
CO2 SERPL-SCNC: 21 MMOL/L — LOW (ref 22–31)
CREAT SERPL-MCNC: 1.06 MG/DL — SIGNIFICANT CHANGE UP (ref 0.5–1.3)
GLUCOSE SERPL-MCNC: 119 MG/DL — HIGH (ref 70–99)
HCT VFR BLD CALC: 46.6 % — SIGNIFICANT CHANGE UP (ref 39–50)
HGB BLD-MCNC: 14.9 G/DL — SIGNIFICANT CHANGE UP (ref 13–17)
MCHC RBC-ENTMCNC: 29 PG — SIGNIFICANT CHANGE UP (ref 27–34)
MCHC RBC-ENTMCNC: 32 % — SIGNIFICANT CHANGE UP (ref 32–36)
MCV RBC AUTO: 90.8 FL — SIGNIFICANT CHANGE UP (ref 80–100)
NRBC # FLD: 0 K/UL — SIGNIFICANT CHANGE UP (ref 0–0)
PLATELET # BLD AUTO: 244 K/UL — SIGNIFICANT CHANGE UP (ref 150–400)
PMV BLD: 11.2 FL — SIGNIFICANT CHANGE UP (ref 7–13)
POTASSIUM SERPL-MCNC: 4 MMOL/L — SIGNIFICANT CHANGE UP (ref 3.5–5.3)
POTASSIUM SERPL-SCNC: 4 MMOL/L — SIGNIFICANT CHANGE UP (ref 3.5–5.3)
RBC # BLD: 5.13 M/UL — SIGNIFICANT CHANGE UP (ref 4.2–5.8)
RBC # FLD: 15.3 % — HIGH (ref 10.3–14.5)
RH IG SCN BLD-IMP: POSITIVE — SIGNIFICANT CHANGE UP
SODIUM SERPL-SCNC: 141 MMOL/L — SIGNIFICANT CHANGE UP (ref 135–145)
WBC # BLD: 8.03 K/UL — SIGNIFICANT CHANGE UP (ref 3.8–10.5)
WBC # FLD AUTO: 8.03 K/UL — SIGNIFICANT CHANGE UP (ref 3.8–10.5)

## 2019-10-10 RX ORDER — SODIUM CHLORIDE 9 MG/ML
1000 INJECTION, SOLUTION INTRAVENOUS
Refills: 0 | Status: DISCONTINUED | OUTPATIENT
Start: 2019-10-24 | End: 2019-10-24

## 2019-10-10 NOTE — H&P PST ADULT - NEGATIVE GENERAL GENITOURINARY SYMPTOMS
no hematuria/no flank pain R/no bladder infections/normal urinary frequency/no dysuria/no urinary hesitancy/no flank pain L

## 2019-10-10 NOTE — H&P PST ADULT - HISTORY OF PRESENT ILLNESS
43 year old male with hx recurrent kidney stones. Now with pain on urination, blood in urine, frequency urgency work up dx with stones. Per pt had bilateral stents placed Memorial Day weekend 2019 at [facility]. Now for cystoscopy right retrograde right percutaneous stone removal, and left ureteroscopy with laser/stone removal, stent exchange/possible nephrostomy/NU catheter placement  44 yo male with hx of multiple recurrent kidney stones, s/p multiple surgeries for kidney stone, found to have hypercalcemia, hyperparathyroidism, s/p parathyroidectomy in 4/2019. Patient reports, recurrent hypercalcemia  & kidney stone noted post parathyroidectomy, had CT scan of neck done (8/2019) - patient states, "nodules noted in chest area in CT scan", was referred to Dr. Martinez, now scheduled for flexible bronchoscopy, cervical mediastinoscopy resection of anterior mediastinal nodule with faith retractor, possible right video assisted thoracic surgery, possible thymemectomy on 10/24/2019; 44 yo male with hx of multiple kidney stones, s/p multiple surgeries for kidney stone, found to have hypercalcemia, hyperparathyroidism, s/p parathyroidectomy in 4/2019. Patient reports, recurrent hypercalcemia  & kidney stone noted post parathyroidectomy, had CT scan of neck done (8/2019) - patient states, "CT scan showed nodules in chest area", was referred to Dr. Martinez, now scheduled for flexible bronchoscopy, cervical mediastinoscopy resection of anterior mediastinal nodule with faith retractor, possible right video assisted thoracic surgery, possible thymemectomy on 10/24/2019;

## 2019-10-10 NOTE — H&P PST ADULT - NEGATIVE ENMT SYMPTOMS
no nasal discharge/no vertigo/no throat pain/no ear pain/no dysphagia/no sinus symptoms/no nasal congestion/no hearing difficulty/no nasal obstruction/no post-nasal discharge/no tinnitus

## 2019-10-10 NOTE — H&P PST ADULT - HEIGHT IN FEET
Patient calling  Patient states she has been constipated. Last bowel movement April 8 after taking Soda and vinegar and prune juice denae April 7. States she had bowel movement also on April 7. Last BM prior to April 7 stool was April 1. States she still feels full today. Denies abdominal tenderness. Patient made an appointment was made for today April 8 with Dr Cristo Min. Suggest she go to walk in care for more immediate treatment. Patient states she will go to walk in care  Appointment for April 8 cancelled. 3-way repeat back was completed on the information provided by the patient for verification and accuracy. Patient was in agreement and denied further questions. 5

## 2019-10-10 NOTE — H&P PST ADULT - NSICDXPASTSURGICALHX_GEN_ALL_CORE_FT
PAST SURGICAL HISTORY:  H/O cystoscopy multiple recent cystoscopy with percutaneous stone removal  in 9/2019    H/O parathyroidectomy 5/2019    H/O right knee surgery 1997    History of urethral stent x2, S/P nephrostolithotomy  ***Most recently stents  placed Memorial Day Weekend 2019  done at [facility]    Kidney stone s/p percutaneous stone removal (8/2019)

## 2019-10-10 NOTE — H&P PST ADULT - NSICDXPROBLEM_GEN_ALL_CORE_FT
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 labs done and results pending.  Verbal and written preop instruction given and explained. Patient verbalized understanding. Chlorhexidine antiseptic solution with written and verbal instruction given & Patient verbalized understanding with return demonstration. Famotidine provided with instruction. Patient verbalized understanding.   Cardiac evaluation requested by surgeon as per pt. & has evaluated by Dr. Romero. Recent echocardiogram in chart.

## 2019-10-21 PROBLEM — Z13.6 SCREENING FOR CARDIOVASCULAR CONDITION: Status: ACTIVE | Noted: 2019-10-21

## 2019-10-23 ENCOUNTER — TRANSCRIPTION ENCOUNTER (OUTPATIENT)
Age: 44
End: 2019-10-23

## 2019-10-24 ENCOUNTER — RESULT REVIEW (OUTPATIENT)
Age: 44
End: 2019-10-24

## 2019-10-24 ENCOUNTER — INPATIENT (INPATIENT)
Facility: HOSPITAL | Age: 44
LOS: 0 days | Discharge: HOME | End: 2019-10-24
Attending: THORACIC SURGERY (CARDIOTHORACIC VASCULAR SURGERY) | Admitting: THORACIC SURGERY (CARDIOTHORACIC VASCULAR SURGERY)
Payer: MEDICAID

## 2019-10-24 ENCOUNTER — APPOINTMENT (OUTPATIENT)
Dept: THORACIC SURGERY | Facility: HOSPITAL | Age: 44
End: 2019-10-24

## 2019-10-24 VITALS
HEART RATE: 81 BPM | SYSTOLIC BLOOD PRESSURE: 164 MMHG | TEMPERATURE: 98 F | DIASTOLIC BLOOD PRESSURE: 95 MMHG | OXYGEN SATURATION: 99 % | WEIGHT: 244.93 LBS | RESPIRATION RATE: 16 BRPM | HEIGHT: 67.5 IN

## 2019-10-24 VITALS
DIASTOLIC BLOOD PRESSURE: 89 MMHG | HEART RATE: 80 BPM | SYSTOLIC BLOOD PRESSURE: 123 MMHG | OXYGEN SATURATION: 100 % | RESPIRATION RATE: 16 BRPM

## 2019-10-24 DIAGNOSIS — Z98.890 OTHER SPECIFIED POSTPROCEDURAL STATES: Chronic | ICD-10-CM

## 2019-10-24 DIAGNOSIS — N20.0 CALCULUS OF KIDNEY: Chronic | ICD-10-CM

## 2019-10-24 DIAGNOSIS — D49.89 NEOPLASM OF UNSPECIFIED BEHAVIOR OF OTHER SPECIFIED SITES: ICD-10-CM

## 2019-10-24 LAB — RH IG SCN BLD-IMP: POSITIVE — SIGNIFICANT CHANGE UP

## 2019-10-24 PROCEDURE — 88332 PATH CONSLTJ SURG EA ADD BLK: CPT | Mod: 26

## 2019-10-24 PROCEDURE — 60500 EXPLORE PARATHYROID GLANDS: CPT

## 2019-10-24 PROCEDURE — 88307 TISSUE EXAM BY PATHOLOGIST: CPT | Mod: 26

## 2019-10-24 PROCEDURE — 88331 PATH CONSLTJ SURG 1 BLK 1SPC: CPT | Mod: 26

## 2019-10-24 PROCEDURE — 39401 MEDIASTINOSCPY W/MEDSTNL BX: CPT

## 2019-10-24 RX ORDER — OXYCODONE HYDROCHLORIDE 5 MG/1
5 TABLET ORAL ONCE
Refills: 0 | Status: DISCONTINUED | OUTPATIENT
Start: 2019-10-24 | End: 2019-10-24

## 2019-10-24 RX ORDER — FENTANYL CITRATE 50 UG/ML
25 INJECTION INTRAVENOUS
Refills: 0 | Status: DISCONTINUED | OUTPATIENT
Start: 2019-10-24 | End: 2019-10-24

## 2019-10-24 RX ORDER — CALCIUM CARBONATE 500(1250)
4 TABLET ORAL ONCE
Refills: 0 | Status: COMPLETED | OUTPATIENT
Start: 2019-10-24 | End: 2019-10-24

## 2019-10-24 RX ORDER — TAMSULOSIN HYDROCHLORIDE 0.4 MG/1
1 CAPSULE ORAL
Qty: 0 | Refills: 0 | DISCHARGE

## 2019-10-24 RX ORDER — HYDROMORPHONE HYDROCHLORIDE 2 MG/ML
0.5 INJECTION INTRAMUSCULAR; INTRAVENOUS; SUBCUTANEOUS
Refills: 0 | Status: DISCONTINUED | OUTPATIENT
Start: 2019-10-24 | End: 2019-10-24

## 2019-10-24 RX ORDER — CEFDINIR 250 MG/5ML
1 POWDER, FOR SUSPENSION ORAL
Qty: 0 | Refills: 0 | DISCHARGE

## 2019-10-24 RX ORDER — FENTANYL CITRATE 50 UG/ML
50 INJECTION INTRAVENOUS
Refills: 0 | Status: DISCONTINUED | OUTPATIENT
Start: 2019-10-24 | End: 2019-10-24

## 2019-10-24 RX ORDER — IBUPROFEN 200 MG
1 TABLET ORAL
Qty: 0 | Refills: 0 | DISCHARGE

## 2019-10-24 RX ORDER — ONDANSETRON 8 MG/1
4 TABLET, FILM COATED ORAL ONCE
Refills: 0 | Status: DISCONTINUED | OUTPATIENT
Start: 2019-10-24 | End: 2019-10-24

## 2019-10-24 RX ADMIN — HYDROMORPHONE HYDROCHLORIDE 0.5 MILLIGRAM(S): 2 INJECTION INTRAMUSCULAR; INTRAVENOUS; SUBCUTANEOUS at 16:45

## 2019-10-24 RX ADMIN — OXYCODONE HYDROCHLORIDE 5 MILLIGRAM(S): 5 TABLET ORAL at 14:44

## 2019-10-24 RX ADMIN — HYDROMORPHONE HYDROCHLORIDE 0.5 MILLIGRAM(S): 2 INJECTION INTRAMUSCULAR; INTRAVENOUS; SUBCUTANEOUS at 16:09

## 2019-10-24 RX ADMIN — Medication 4 TABLET(S): at 15:32

## 2019-10-24 RX ADMIN — FENTANYL CITRATE 25 MICROGRAM(S): 50 INJECTION INTRAVENOUS at 14:27

## 2019-10-24 RX ADMIN — FENTANYL CITRATE 25 MICROGRAM(S): 50 INJECTION INTRAVENOUS at 14:12

## 2019-10-24 RX ADMIN — OXYCODONE HYDROCHLORIDE 5 MILLIGRAM(S): 5 TABLET ORAL at 15:23

## 2019-10-24 NOTE — ASU DISCHARGE PLAN (ADULT/PEDIATRIC) - COMMENTS
Call to make an appointment for 2 weeks from now.  Please call at anytime if you have any questions or concerns.

## 2019-10-24 NOTE — ASU DISCHARGE PLAN (ADULT/PEDIATRIC) - CARE PROVIDER_API CALL
Jv Martinez)  Surgery; Thoracic Surgery  [address] [place] [address], [place] [address]  Phone: [phone]  Fax: [phone]  Follow Up Time:

## 2019-10-24 NOTE — ASU DISCHARGE PLAN (ADULT/PEDIATRIC) - CALL YOUR DOCTOR IF YOU HAVE ANY OF THE FOLLOWING:
Bleeding that does not stop/Swelling that gets worse/Fever greater than (need to indicate Fahrenheit or Celsius)/Wound/Surgical Site with redness, or foul smelling discharge or pus/Pain not relieved by Medications Swelling that gets worse/Bleeding that does not stop/Fever greater than (need to indicate Fahrenheit or Celsius)/Wound/Surgical Site with redness, or foul smelling discharge or pus/Nausea and vomiting that does not stop/Pain not relieved by Medications

## 2019-10-24 NOTE — ASU DISCHARGE PLAN (ADULT/PEDIATRIC) - NURSING INSTRUCTIONS
Do not take pain medication on an empty stomach.  Increase fluids and fiber in diet to prevent constipation. Do not take pain medication on an empty stomach.  Increase fluids and fiber in diet to prevent constipation. you may take percocet as needed for pain and per your dr's instructions.

## 2019-10-24 NOTE — ASU PATIENT PROFILE, ADULT - PSH
H/O cystoscopy  multiple recent cystoscopy with percutaneous stone removal  in 9/2019  H/O parathyroidectomy  5/2019  H/O right knee surgery  1997  History of urethral stent  x2, S/P nephrostolithotomy  ***Most recently stents  placed Memorial Day Weekend 2019  done at [facility]  Kidney stone  s/p percutaneous stone removal (8/2019)

## 2019-10-24 NOTE — ASU DISCHARGE PLAN (ADULT/PEDIATRIC) - FOLLOW UP APPOINTMENTS
911 or go to the nearest Emergency Room may also call Recovery Room (PACU) 24/7 @ [phone]/[facility] ASU (Adult):

## 2019-10-24 NOTE — ASU DISCHARGE PLAN (ADULT/PEDIATRIC) - ASU DC SPECIAL INSTRUCTIONSFT
A Prescription for pain medication was sent to your pharmacy.  Obtain blood work prior to your appointment with Dr Martinez to check your PTH and Calcium level.    You Must take TUMS, which you can buy over the counter at any pharmacy as follows.    Friday 10/25:           4 tablets of Tums by mouth  Saturday 10/26:       4 tablets of Tums by mouth  Franki 10/27:         4 tablets of Tums by mouth  Monday 10/28:       2 tablets of Tums by mouth  Tuesday 10/29:       2 tablets of Tums by mouth  Wednesday 10/30:  2 tablets of Tums by mouth  Thursday 10/31:     1 tablets of Tums by mouth daily until Dr Martinez advises you to stop A Prescription for pain medication was sent to your pharmacy.  Obtain blood work prior to your appointment with Dr Martinez to check your PTH and Calcium level.    You Must take TUMS, which you can buy over the counter at any pharmacy as follows.    Friday 10/25:           1 Tums by mouth with each meal and 1 at bedtime (4 Tums total)  Saturday 10/26:       1 Tums by mouth daily until your follow up appointment with Dr Martinez

## 2019-10-24 NOTE — ASU PATIENT PROFILE, ADULT - PMH
H/O urinary frequency    Hematuria    History of dysuria    Hyperparathyroidism    Kidney stones    Obesity

## 2019-10-24 NOTE — BRIEF OPERATIVE NOTE - NSICDXBRIEFPROCEDURE_GEN_ALL_CORE_FT
PROCEDURES:  Transcervical thymectomy 24-Oct-2019 12:23:06 Resection of ectopic parathyroid adenoma Jenelle Howe

## 2019-10-31 PROCEDURE — 85027 COMPLETE CBC AUTOMATED: CPT

## 2019-10-31 PROCEDURE — C1889: CPT

## 2019-10-31 PROCEDURE — 76000 FLUOROSCOPY <1 HR PHYS/QHP: CPT

## 2019-10-31 PROCEDURE — 86850 RBC ANTIBODY SCREEN: CPT

## 2019-10-31 PROCEDURE — 80048 BASIC METABOLIC PNL TOTAL CA: CPT

## 2019-10-31 PROCEDURE — 86900 BLOOD TYPING SEROLOGIC ABO: CPT

## 2019-10-31 PROCEDURE — C1758: CPT

## 2019-10-31 PROCEDURE — 86901 BLOOD TYPING SEROLOGIC RH(D): CPT

## 2019-10-31 PROCEDURE — C1769: CPT

## 2019-10-31 PROCEDURE — C1726: CPT

## 2019-10-31 PROCEDURE — 82365 CALCULUS SPECTROSCOPY: CPT

## 2019-10-31 PROCEDURE — 87086 URINE CULTURE/COLONY COUNT: CPT

## 2019-10-31 PROCEDURE — C1887: CPT

## 2019-10-31 PROCEDURE — 88300 SURGICAL PATH GROSS: CPT

## 2019-10-31 PROCEDURE — C2617: CPT

## 2019-10-31 PROCEDURE — 87070 CULTURE OTHR SPECIMN AEROBIC: CPT

## 2019-10-31 PROCEDURE — 87186 SC STD MICRODIL/AGAR DIL: CPT

## 2019-11-01 ENCOUNTER — OUTPATIENT (OUTPATIENT)
Dept: OUTPATIENT SERVICES | Facility: HOSPITAL | Age: 44
LOS: 1 days | End: 2019-11-01

## 2019-11-01 DIAGNOSIS — Z98.890 OTHER SPECIFIED POSTPROCEDURAL STATES: Chronic | ICD-10-CM

## 2019-11-01 DIAGNOSIS — N20.0 CALCULUS OF KIDNEY: Chronic | ICD-10-CM

## 2019-11-06 ENCOUNTER — LABORATORY RESULT (OUTPATIENT)
Age: 44
End: 2019-11-06

## 2019-11-12 ENCOUNTER — APPOINTMENT (OUTPATIENT)
Dept: RADIOLOGY | Facility: HOSPITAL | Age: 44
End: 2019-11-12

## 2019-11-12 ENCOUNTER — APPOINTMENT (OUTPATIENT)
Dept: THORACIC SURGERY | Facility: CLINIC | Age: 44
End: 2019-11-12
Payer: MEDICAID

## 2019-11-12 ENCOUNTER — INPATIENT (INPATIENT)
Facility: HOSPITAL | Age: 44
LOS: 0 days | Discharge: LEFT BEFORE DISCHARGE | End: 2019-11-13
Attending: INTERNAL MEDICINE | Admitting: INTERNAL MEDICINE
Payer: MEDICAID

## 2019-11-12 ENCOUNTER — OUTPATIENT (OUTPATIENT)
Dept: OUTPATIENT SERVICES | Facility: HOSPITAL | Age: 44
LOS: 1 days | End: 2019-11-12
Payer: MEDICAID

## 2019-11-12 VITALS
RESPIRATION RATE: 17 BRPM | TEMPERATURE: 98.1 F | BODY MASS INDEX: 37.71 KG/M2 | DIASTOLIC BLOOD PRESSURE: 98 MMHG | OXYGEN SATURATION: 99 % | WEIGHT: 248 LBS | SYSTOLIC BLOOD PRESSURE: 140 MMHG | HEART RATE: 65 BPM

## 2019-11-12 VITALS
RESPIRATION RATE: 18 BRPM | DIASTOLIC BLOOD PRESSURE: 112 MMHG | OXYGEN SATURATION: 100 % | SYSTOLIC BLOOD PRESSURE: 159 MMHG | HEART RATE: 78 BPM | TEMPERATURE: 98 F

## 2019-11-12 DIAGNOSIS — Z98.890 OTHER SPECIFIED POSTPROCEDURAL STATES: Chronic | ICD-10-CM

## 2019-11-12 DIAGNOSIS — E21.3 HYPERPARATHYROIDISM, UNSPECIFIED: ICD-10-CM

## 2019-11-12 DIAGNOSIS — N20.0 CALCULUS OF KIDNEY: Chronic | ICD-10-CM

## 2019-11-12 DIAGNOSIS — Z29.9 ENCOUNTER FOR PROPHYLACTIC MEASURES, UNSPECIFIED: ICD-10-CM

## 2019-11-12 DIAGNOSIS — I10 ESSENTIAL (PRIMARY) HYPERTENSION: ICD-10-CM

## 2019-11-12 DIAGNOSIS — E83.51 HYPOCALCEMIA: ICD-10-CM

## 2019-11-12 DIAGNOSIS — D49.89 NEOPLASM OF UNSPECIFIED BEHAVIOR OF OTHER SPECIFIED SITES: ICD-10-CM

## 2019-11-12 DIAGNOSIS — Z02.9 ENCOUNTER FOR ADMINISTRATIVE EXAMINATIONS, UNSPECIFIED: ICD-10-CM

## 2019-11-12 DIAGNOSIS — E83.42 HYPOMAGNESEMIA: ICD-10-CM

## 2019-11-12 DIAGNOSIS — E83.39 OTHER DISORDERS OF PHOSPHORUS METABOLISM: ICD-10-CM

## 2019-11-12 LAB
ALBUMIN SERPL ELPH-MCNC: 4 G/DL — SIGNIFICANT CHANGE UP (ref 3.3–5)
ALP SERPL-CCNC: 119 U/L — SIGNIFICANT CHANGE UP (ref 40–120)
ALT FLD-CCNC: 19 U/L — SIGNIFICANT CHANGE UP (ref 4–41)
ANION GAP SERPL CALC-SCNC: 12 MMO/L — SIGNIFICANT CHANGE UP (ref 7–14)
AST SERPL-CCNC: 16 U/L — SIGNIFICANT CHANGE UP (ref 4–40)
BASE EXCESS BLDV CALC-SCNC: 0.6 MMOL/L — SIGNIFICANT CHANGE UP
BASOPHILS # BLD AUTO: 0.05 K/UL — SIGNIFICANT CHANGE UP (ref 0–0.2)
BASOPHILS NFR BLD AUTO: 0.6 % — SIGNIFICANT CHANGE UP (ref 0–2)
BILIRUB SERPL-MCNC: 0.4 MG/DL — SIGNIFICANT CHANGE UP (ref 0.2–1.2)
BLOOD GAS VENOUS - CREATININE: 1.05 MG/DL — SIGNIFICANT CHANGE UP (ref 0.5–1.3)
BLOOD GAS VENOUS - FIO2: 16 — SIGNIFICANT CHANGE UP
BUN SERPL-MCNC: 19 MG/DL — SIGNIFICANT CHANGE UP (ref 7–23)
CA-I BLD-SCNC: 0.83 MMOL/L — LOW (ref 1.03–1.23)
CA-I BLD-SCNC: 0.85 MMOL/L — LOW (ref 1.03–1.23)
CALCIUM SERPL-MCNC: 6.6 MG/DL — LOW (ref 8.4–10.5)
CHLORIDE BLDV-SCNC: 111 MMOL/L — HIGH (ref 96–108)
CHLORIDE SERPL-SCNC: 107 MMOL/L — SIGNIFICANT CHANGE UP (ref 98–107)
CO2 SERPL-SCNC: 24 MMOL/L — SIGNIFICANT CHANGE UP (ref 22–31)
CREAT SERPL-MCNC: 1.06 MG/DL — SIGNIFICANT CHANGE UP (ref 0.5–1.3)
EOSINOPHIL # BLD AUTO: 0.36 K/UL — SIGNIFICANT CHANGE UP (ref 0–0.5)
EOSINOPHIL NFR BLD AUTO: 4 % — SIGNIFICANT CHANGE UP (ref 0–6)
GAS PNL BLDV: 141 MMOL/L — SIGNIFICANT CHANGE UP (ref 136–146)
GLUCOSE BLDV-MCNC: 92 MG/DL — SIGNIFICANT CHANGE UP (ref 70–99)
GLUCOSE SERPL-MCNC: 94 MG/DL — SIGNIFICANT CHANGE UP (ref 70–99)
HCO3 BLDV-SCNC: 23 MMOL/L — SIGNIFICANT CHANGE UP (ref 20–27)
HCT VFR BLD CALC: 46.9 % — SIGNIFICANT CHANGE UP (ref 39–50)
HCT VFR BLDV CALC: 48.9 % — SIGNIFICANT CHANGE UP (ref 39–51)
HGB BLD-MCNC: 15 G/DL — SIGNIFICANT CHANGE UP (ref 13–17)
HGB BLDV-MCNC: 16 G/DL — SIGNIFICANT CHANGE UP (ref 13–17)
IMM GRANULOCYTES NFR BLD AUTO: 1.1 % — SIGNIFICANT CHANGE UP (ref 0–1.5)
LACTATE BLDV-MCNC: 1.2 MMOL/L — SIGNIFICANT CHANGE UP (ref 0.5–2)
LYMPHOCYTES # BLD AUTO: 2.62 K/UL — SIGNIFICANT CHANGE UP (ref 1–3.3)
LYMPHOCYTES # BLD AUTO: 29.3 % — SIGNIFICANT CHANGE UP (ref 13–44)
MAGNESIUM SERPL-MCNC: 1.5 MG/DL — LOW (ref 1.6–2.6)
MCHC RBC-ENTMCNC: 29 PG — SIGNIFICANT CHANGE UP (ref 27–34)
MCHC RBC-ENTMCNC: 32 % — SIGNIFICANT CHANGE UP (ref 32–36)
MCV RBC AUTO: 90.7 FL — SIGNIFICANT CHANGE UP (ref 80–100)
MONOCYTES # BLD AUTO: 0.79 K/UL — SIGNIFICANT CHANGE UP (ref 0–0.9)
MONOCYTES NFR BLD AUTO: 8.8 % — SIGNIFICANT CHANGE UP (ref 2–14)
NEUTROPHILS # BLD AUTO: 5.01 K/UL — SIGNIFICANT CHANGE UP (ref 1.8–7.4)
NEUTROPHILS NFR BLD AUTO: 56.2 % — SIGNIFICANT CHANGE UP (ref 43–77)
NRBC # FLD: 0 K/UL — SIGNIFICANT CHANGE UP (ref 0–0)
PCO2 BLDV: 47 MMHG — SIGNIFICANT CHANGE UP (ref 41–51)
PH BLDV: 7.35 PH — SIGNIFICANT CHANGE UP (ref 7.32–7.43)
PHOSPHATE SERPL-MCNC: 5.7 MG/DL — HIGH (ref 2.5–4.5)
PLATELET # BLD AUTO: 258 K/UL — SIGNIFICANT CHANGE UP (ref 150–400)
PMV BLD: 10.7 FL — SIGNIFICANT CHANGE UP (ref 7–13)
PO2 BLDV: 25 MMHG — LOW (ref 35–40)
POTASSIUM BLDV-SCNC: 3.6 MMOL/L — SIGNIFICANT CHANGE UP (ref 3.4–4.5)
POTASSIUM SERPL-MCNC: 3.8 MMOL/L — SIGNIFICANT CHANGE UP (ref 3.5–5.3)
POTASSIUM SERPL-SCNC: 3.8 MMOL/L — SIGNIFICANT CHANGE UP (ref 3.5–5.3)
PROT SERPL-MCNC: 7.3 G/DL — SIGNIFICANT CHANGE UP (ref 6–8.3)
RBC # BLD: 5.17 M/UL — SIGNIFICANT CHANGE UP (ref 4.2–5.8)
RBC # FLD: 15 % — HIGH (ref 10.3–14.5)
SAO2 % BLDV: 43.9 % — LOW (ref 60–85)
SODIUM SERPL-SCNC: 143 MMOL/L — SIGNIFICANT CHANGE UP (ref 135–145)
TSH SERPL-MCNC: 0.46 UIU/ML — SIGNIFICANT CHANGE UP (ref 0.27–4.2)
WBC # BLD: 8.93 K/UL — SIGNIFICANT CHANGE UP (ref 3.8–10.5)
WBC # FLD AUTO: 8.93 K/UL — SIGNIFICANT CHANGE UP (ref 3.8–10.5)

## 2019-11-12 PROCEDURE — 99024 POSTOP FOLLOW-UP VISIT: CPT

## 2019-11-12 PROCEDURE — 71046 X-RAY EXAM CHEST 2 VIEWS: CPT | Mod: 26

## 2019-11-12 RX ORDER — SODIUM CHLORIDE 9 MG/ML
3 INJECTION INTRAMUSCULAR; INTRAVENOUS; SUBCUTANEOUS EVERY 8 HOURS
Refills: 0 | Status: DISCONTINUED | OUTPATIENT
Start: 2019-11-12 | End: 2019-11-13

## 2019-11-12 RX ORDER — CALCIUM CARBONATE 500(1250)
1 TABLET ORAL
Qty: 0 | Refills: 0 | DISCHARGE

## 2019-11-12 RX ORDER — LOSARTAN/HYDROCHLOROTHIAZIDE 100MG-25MG
0 TABLET ORAL
Qty: 30 | Refills: 0 | DISCHARGE

## 2019-11-12 RX ORDER — HEPARIN SODIUM 5000 [USP'U]/ML
5000 INJECTION INTRAVENOUS; SUBCUTANEOUS EVERY 8 HOURS
Refills: 0 | Status: DISCONTINUED | OUTPATIENT
Start: 2019-11-12 | End: 2019-11-13

## 2019-11-12 RX ORDER — CHOLECALCIFEROL (VITAMIN D3) 125 MCG
0 CAPSULE ORAL
Qty: 30 | Refills: 0 | DISCHARGE

## 2019-11-12 RX ORDER — CALCIUM GLUCONATE 100 MG/ML
2 VIAL (ML) INTRAVENOUS ONCE
Refills: 0 | Status: COMPLETED | OUTPATIENT
Start: 2019-11-12 | End: 2019-11-12

## 2019-11-12 RX ORDER — DICLOFENAC SODIUM 75 MG/1
0 TABLET, DELAYED RELEASE ORAL
Qty: 15 | Refills: 0 | DISCHARGE

## 2019-11-12 RX ORDER — MAGNESIUM SULFATE 500 MG/ML
1 VIAL (ML) INJECTION ONCE
Refills: 0 | Status: COMPLETED | OUTPATIENT
Start: 2019-11-12 | End: 2019-11-12

## 2019-11-12 RX ORDER — LOSARTAN POTASSIUM 100 MG/1
25 TABLET, FILM COATED ORAL DAILY
Refills: 0 | Status: DISCONTINUED | OUTPATIENT
Start: 2019-11-12 | End: 2019-11-13

## 2019-11-12 RX ADMIN — Medication 100 GRAM(S): at 19:20

## 2019-11-12 RX ADMIN — Medication 200 GRAM(S): at 15:45

## 2019-11-12 RX ADMIN — HEPARIN SODIUM 5000 UNIT(S): 5000 INJECTION INTRAVENOUS; SUBCUTANEOUS at 22:50

## 2019-11-12 RX ADMIN — SODIUM CHLORIDE 3 MILLILITER(S): 9 INJECTION INTRAMUSCULAR; INTRAVENOUS; SUBCUTANEOUS at 22:00

## 2019-11-12 RX ADMIN — LOSARTAN POTASSIUM 25 MILLIGRAM(S): 100 TABLET, FILM COATED ORAL at 22:06

## 2019-11-12 NOTE — ED PROVIDER NOTE - CLINICAL SUMMARY MEDICAL DECISION MAKING FREE TEXT BOX
44M s/p parathyroidectomy p/w diffuse parasethesias concerning for symptomatic hypocalcemia. low suspicion for central neuro lesion given diffuse nature of symptoms Will check CBC CMP ical and supplement calcium. Will likely require admission if calcium is low. Check EKG Rogelio Orellana DO: 45yo M  pmh recurrent kidney stones s/p parathyroidectomy with thoracic sx p/w diffuse paraesthesias and low out-patient iCal level concerning for symptomatic hypocalcemia. exam without significant abnormality. low suspicion for central neuro cause of symptoms. ekg intervals wnl. Plan: labs, calcium gluc iv, admit.

## 2019-11-12 NOTE — H&P ADULT - NSICDXPASTSURGICALHX_GEN_ALL_CORE_FT
PAST SURGICAL HISTORY:  H/O cystoscopy multiple recent cystoscopy with percutaneous stone removal  in 9/2019    H/O parathyroidectomy 5/2019    H/O right knee surgery 1997    History of urethral stent x2, S/P nephrostolithotomy  ***Most recently stents  placed Memorial Day Weekend 2019  done at [facility]    Kidney stone s/p percutaneous stone removal (8/2019) PAST SURGICAL HISTORY:  H/O cystoscopy multiple recent cystoscopy with percutaneous stone removal  in 9/2019    H/O parathyroidectomy 5/2019    H/O right knee surgery 1997    History of urethral stent x2, S/P nephrostolithotomy  ***Most recently stents  placed Memorial Day Weekend 2019  done at [facility]    Kidney stone s/p percutaneous stone removal (8/2019)

## 2019-11-12 NOTE — H&P ADULT - NSHPSOCIALHISTORY_GEN_ALL_CORE
Tobacco Usage:  (x ) never smoked   ( ) former smoker  ( ) current smoker; Packs per day:   Alcohol Usage: (x ) none  ( ) occasional ( ) 2-3 times a week ( ) daily; Last drink:   Recreational drugs (x ) None  Lives with ...  Denies Recent travel ... Tobacco Usage:  (x ) never smoked   ( ) former smoker  ( ) current smoker; Packs per day:  1-2 cigars per week   Alcohol Usage: () none  ( x) occasional ( ) 2-3 times a week ( ) daily; Last drink:   Recreational drugs (x ) None  Lives alone   currently unemployed   Denies Recent travel drove back from [place] ~2 weeks ago

## 2019-11-12 NOTE — H&P ADULT - HISTORY OF PRESENT ILLNESS
42 yo male with pmhx of multiple kidney stones, s/p kidney stone removals, hypercalcemia & hyperparathyroidism (s/p parathyroidectomy in 4/2019), and obesity sent to the ED by CT surgery office (Dr. Martinez) for outpatient labs demonstrated hypocalcemia (iCal 0.9 per HIE). As per patient he has been experiencing worsening intermittent distal extremity numbness and tingling, and ringing in his head for >1 week. Pt denies chest pain, SOB, fevers, chills, difficulty swallowing, muscle weakness.     EKG- NSR with T-wave inversion in lead 3, no QTC prolongations noted. HR 73 Qtc 473

## 2019-11-12 NOTE — ED ADULT NURSE NOTE - OBJECTIVE STATEMENT
Pt presents to  2 A&Ox4, complaining of hypocalcemia. Pt states "my doctor tested my blood work and told me I need to come to the ER." AS per Pt, in April had thyroid removed states "I kept getting kidney stones of calcium deposits so they thought it was being caused by my thyroid." Pt states he has been feeling numbness/tingling of arms/legs/feet/hands/head for past week. Pt states "my head feels like its buzzing". Pt able to ambulate on own, no arm drift, no slurred speech. Pt VSS, on cardiac monitor in NSR. 18g IV placed in R upper arm, labs drawn and sent. Pt in no obvious distress, will continue to monitor.

## 2019-11-12 NOTE — ED PROVIDER NOTE - OBJECTIVE STATEMENT
44M with parathyroidectomy 2 weeks ago p/w distal extremity numbness and tingling which has been intermittent and diffuse over past few days. Pt went to CT surgery office (Dr. Martinez) today who sent him to ED because outpatient labs demonstrated hypocalcemia (iCal 0.9 per HIE). Pt denies chest pain, SOB, fevers, chills, difficulty swallowing, muscle weakness.

## 2019-11-12 NOTE — ED PROVIDER NOTE - PSH
H/O cystoscopy  multiple recent cystoscopy with percutaneous stone removal  in 9/2019  H/O parathyroidectomy  5/2019  H/O right knee surgery  1997  History of urethral stent  x2, S/P nephrostolithotomy  ***Most recently stents  placed Memorial Day Weekend 2019  done at [facility]  Kidney stone  s/p percutaneous stone removal (8/2019)

## 2019-11-12 NOTE — H&P ADULT - PROBLEM SELECTOR PLAN 3
- Phos level 5.7  - likely 2/2 hypocalcemia   - will monitor for now, repeat labs 2200  - if no improvement will consider other treatment options

## 2019-11-12 NOTE — H&P ADULT - PROBLEM SELECTOR PLAN 6
1.  Name of PCP: CHERRI GARCIA  2.  PCP Contacted on Admission: [X] Y    [ ] N    3.  PCP contacted at Discharge: [ ] Y    [ ] N    [ ] N/A  4.  Post-Discharge Appointment Date and Location:  5.  Summary of Handoff given to PCP:

## 2019-11-12 NOTE — H&P ADULT - PROBLEM SELECTOR PLAN 4
- patient with hx of elevated BP readings however is noncompliant with medications   - will start on low dose losartan 25mg daily with hold parameters for now and adjust as needed   - low salt diet

## 2019-11-12 NOTE — H&P ADULT - NSHPLABSRESULTS_GEN_ALL_CORE
15.0   8.93  )-----------( 258      ( 12 Nov 2019 14:51 )             46.9     11-12    143  |  107  |  19  ----------------------------<  94  3.8   |  24  |  1.06    Ca    6.6<L>      12 Nov 2019 14:51  Phos  5.7     11-12  Mg     1.5     11-12    TPro  7.3  /  Alb  4.0  /  TBili  0.4  /  DBili  x   /  AST  16  /  ALT  19  /  AlkPhos  119  11-12    < from: Xray Chest 2 Views PA/Lat (11.12.19 @ 11:33) >    IMPRESSION:  Clear lungs. No pneumothorax.    < end of copied text >    Blood Gas Venous Comprehensive (11.12.19 @ 14:51)    Blood Gas Venous - Lactate: 1.2: Please note updated reference range. mmol/L    Blood Gas Venous - Chloride: 111 mmol/L    Blood Gas Venous - Creatinine: 1.05 mg/dL    pH, Venous: 7.35 pH    pCO2, Venous: 47 mmHg    pO2, Venous: 25 mmHg    HCO3, Venous: 23 mmol/L    Blood Gas Venous - FIO2: 16    Base Excess, Venous: 0.6: REFERENCE RANGE = -3 + 2 mmol/L mmol/L    Oxygen Saturation, Venous: 43.9 %    Blood Gas Venous - Sodium: 141 mmol/L    Blood Gas Venous - Potassium: 3.6 mmol/L    Blood Gas Venous - Glucose: 92 mg/dL    Blood Gas Venous - Hemoglobin: 16.0 g/dL    Blood Gas Venous - Hematocrit: 48.9 %    Thyroid Stimulating Hormone, Serum (11.12.19 @ 14:51)    Thyroid Stimulating Hormone, Serum: 0.46 uIU/mL    Calcium, Ionized (11.12.19 @ 14:51)    Calcium, Ionized: 0.83 mmol/L 15.0   8.93  )-----------( 258      ( 12 Nov 2019 14:51 )             46.9     11-12    143  |  107  |  19  ----------------------------<  94  3.8   |  24  |  1.06    Ca    6.6<L>      12 Nov 2019 14:51  Phos  5.7     11-12  Mg     1.5     11-12    TPro  7.3  /  Alb  4.0  /  TBili  0.4  /  DBili  x   /  AST  16  /  ALT  19  /  AlkPhos  119  11-12    < from: Xray Chest 2 Views PA/Lat (11.12.19 @ 11:33) >    IMPRESSION:  Clear lungs. No pneumothorax.    < end of copied text >    Blood Gas Venous Comprehensive (11.12.19 @ 14:51)    Blood Gas Venous - Lactate: 1.2: Please note updated reference range. mmol/L    Blood Gas Venous - Chloride: 111 mmol/L    Blood Gas Venous - Creatinine: 1.05 mg/dL    pH, Venous: 7.35 pH    pCO2, Venous: 47 mmHg    pO2, Venous: 25 mmHg    HCO3, Venous: 23 mmol/L    Blood Gas Venous - FIO2: 16    Base Excess, Venous: 0.6: REFERENCE RANGE = -3 + 2 mmol/L mmol/L    Oxygen Saturation, Venous: 43.9 %    Blood Gas Venous - Sodium: 141 mmol/L    Blood Gas Venous - Potassium: 3.6 mmol/L    Blood Gas Venous - Glucose: 92 mg/dL    Blood Gas Venous - Hemoglobin: 16.0 g/dL    Blood Gas Venous - Hematocrit: 48.9 %    Thyroid Stimulating Hormone, Serum (11.12.19 @ 14:51)    Thyroid Stimulating Hormone, Serum: 0.46 uIU/mL    Calcium, Ionized (11.12.19 @ 14:51)    Calcium, Ionized: 0.83 mmol/L    EKG- NSR with T-wave inversion in lead 3, no QTC prolongations noted. HR 73 Qtc 473

## 2019-11-12 NOTE — ED ADULT TRIAGE NOTE - CHIEF COMPLAINT QUOTE
pt sent to ED from clinic.  pt c/o entire body tingling x several days.  pt had a thyroidectomy 2 weeks ago and pt was sent to ED for hypocalcemia

## 2019-11-12 NOTE — ED PROVIDER NOTE - PHYSICAL EXAMINATION
Gen: NAD, AOx3  Head: NCAT  HEENT: PERRL, oral mucosa moist, normal conjunctiva, oropharynx clear without exudate or erythema  Lung: CTAB, no respiratory distress, no wheezing, rales, rhonchi  CV: normal s1/s2, rrr, no murmurs, Normal perfusion, pulses 2+ throughout  Abd: soft, NTND, no CVA tenderness  MSK: No edema, no visible deformities, full range of motion in all 4 extremities  Neuro: CN II-XII grossly intact, No focal neurologic deficits, negative Chvostek sign  Skin: No rash   Psych: normal affect

## 2019-11-12 NOTE — ED ADULT NURSE REASSESSMENT NOTE - NS ED NURSE REASSESS COMMENT FT1
Pt denies any pain/discomfort, VSS, report given to CDU RN Zaria. Pt taken off cardiac monitor for transfer over, receiving RN made aware of need for cardiac monitoring.

## 2019-11-12 NOTE — H&P ADULT - ATTENDING COMMENTS
An office patient with s/p parathyroidencomy admitted with symptomatic hypocalcemia  Detail H and P, management d/w PA, will follow Endocrinology consult.

## 2019-11-12 NOTE — H&P ADULT - PROBLEM SELECTOR PLAN 1
- ADMIT tele   - am labs ordered   - CALCIUM- 6.6, Ionized Ca 0.83  - s/p supplementation flu repeat ca levels 2200  - EKG - NSR with T-wave inversion in lead 3, no QTC prolongations noted. HR 73 Qtc 473  - endo consult emailed for Ca supplemented   - f/u MD note   - plan discussed with attending Sekou Power

## 2019-11-12 NOTE — H&P ADULT - ASSESSMENT
42 yo male with pmhx of multiple kidney stones, s/p kidney stone removals, hypercalcemia & hyperparathyroidism (s/p parathyroidectomy in 4/2019), and obesity sent to the ED by CT surgery office (Dr. Martinez) for outpatient labs demonstrated hypocalcemia (iCal 0.9 per HIE).

## 2019-11-13 ENCOUNTER — TRANSCRIPTION ENCOUNTER (OUTPATIENT)
Age: 44
End: 2019-11-13

## 2019-11-13 VITALS
HEART RATE: 82 BPM | DIASTOLIC BLOOD PRESSURE: 104 MMHG | SYSTOLIC BLOOD PRESSURE: 138 MMHG | OXYGEN SATURATION: 100 % | RESPIRATION RATE: 18 BRPM | TEMPERATURE: 98 F

## 2019-11-13 LAB
ALBUMIN SERPL ELPH-MCNC: 3.6 G/DL — SIGNIFICANT CHANGE UP (ref 3.3–5)
ALBUMIN SERPL ELPH-MCNC: 3.7 G/DL — SIGNIFICANT CHANGE UP (ref 3.3–5)
ALP SERPL-CCNC: 103 U/L — SIGNIFICANT CHANGE UP (ref 40–120)
ALP SERPL-CCNC: 111 U/L — SIGNIFICANT CHANGE UP (ref 40–120)
ALT FLD-CCNC: 16 U/L — SIGNIFICANT CHANGE UP (ref 4–41)
ALT FLD-CCNC: 19 U/L — SIGNIFICANT CHANGE UP (ref 4–41)
ANION GAP SERPL CALC-SCNC: 12 MMO/L — SIGNIFICANT CHANGE UP (ref 7–14)
ANION GAP SERPL CALC-SCNC: 15 MMO/L — HIGH (ref 7–14)
ANION GAP SERPL CALC-SCNC: 16 MMO/L — HIGH (ref 7–14)
APTT BLD: 30.5 SEC — SIGNIFICANT CHANGE UP (ref 27.5–36.3)
AST SERPL-CCNC: 13 U/L — SIGNIFICANT CHANGE UP (ref 4–40)
AST SERPL-CCNC: 13 U/L — SIGNIFICANT CHANGE UP (ref 4–40)
BASE EXCESS BLDV CALC-SCNC: -1 MMOL/L — SIGNIFICANT CHANGE UP
BILIRUB SERPL-MCNC: 0.4 MG/DL — SIGNIFICANT CHANGE UP (ref 0.2–1.2)
BILIRUB SERPL-MCNC: 0.4 MG/DL — SIGNIFICANT CHANGE UP (ref 0.2–1.2)
BUN SERPL-MCNC: 18 MG/DL — SIGNIFICANT CHANGE UP (ref 7–23)
BUN SERPL-MCNC: 18 MG/DL — SIGNIFICANT CHANGE UP (ref 7–23)
BUN SERPL-MCNC: 19 MG/DL — SIGNIFICANT CHANGE UP (ref 7–23)
CA-I BLD-SCNC: 0.89 MMOL/L — LOW (ref 1.03–1.23)
CALCIUM SERPL-MCNC: 6.3 MG/DL — CRITICAL LOW (ref 8.4–10.5)
CALCIUM SERPL-MCNC: 6.9 MG/DL — LOW (ref 8.4–10.5)
CALCIUM SERPL-MCNC: 7.4 MG/DL — LOW (ref 8.4–10.5)
CHLORIDE SERPL-SCNC: 105 MMOL/L — SIGNIFICANT CHANGE UP (ref 98–107)
CHLORIDE SERPL-SCNC: 106 MMOL/L — SIGNIFICANT CHANGE UP (ref 98–107)
CHLORIDE SERPL-SCNC: 106 MMOL/L — SIGNIFICANT CHANGE UP (ref 98–107)
CHLORIDE SERPL-SCNC: 107 MMOL/L — SIGNIFICANT CHANGE UP (ref 98–107)
CHOLEST SERPL-MCNC: 196 MG/DL — SIGNIFICANT CHANGE UP (ref 120–199)
CO2 SERPL-SCNC: 20 MMOL/L — LOW (ref 22–31)
CO2 SERPL-SCNC: 20 MMOL/L — LOW (ref 22–31)
CO2 SERPL-SCNC: 24 MMOL/L — SIGNIFICANT CHANGE UP (ref 22–31)
CO2 SERPL-SCNC: 24 MMOL/L — SIGNIFICANT CHANGE UP (ref 22–31)
CREAT SERPL-MCNC: 0.91 MG/DL — SIGNIFICANT CHANGE UP (ref 0.5–1.3)
CREAT SERPL-MCNC: 0.95 MG/DL — SIGNIFICANT CHANGE UP (ref 0.5–1.3)
CREAT SERPL-MCNC: 1.09 MG/DL — SIGNIFICANT CHANGE UP (ref 0.5–1.3)
GLUCOSE SERPL-MCNC: 108 MG/DL — HIGH (ref 70–99)
GLUCOSE SERPL-MCNC: 125 MG/DL — HIGH (ref 70–99)
GLUCOSE SERPL-MCNC: 95 MG/DL — SIGNIFICANT CHANGE UP (ref 70–99)
HBA1C BLD-MCNC: 5.6 % — SIGNIFICANT CHANGE UP (ref 4–5.6)
HCO3 BLDV-SCNC: 24 MMOL/L — SIGNIFICANT CHANGE UP (ref 20–27)
HCT VFR BLD CALC: 46 % — SIGNIFICANT CHANGE UP (ref 39–50)
HDLC SERPL-MCNC: 22 MG/DL — LOW (ref 35–55)
HGB BLD-MCNC: 15.2 G/DL — SIGNIFICANT CHANGE UP (ref 13–17)
INR BLD: 1.09 — SIGNIFICANT CHANGE UP (ref 0.88–1.17)
LIPID PNL WITH DIRECT LDL SERPL: 149 MG/DL — SIGNIFICANT CHANGE UP
MAGNESIUM SERPL-MCNC: 1.6 MG/DL — SIGNIFICANT CHANGE UP (ref 1.6–2.6)
MAGNESIUM SERPL-MCNC: 1.6 MG/DL — SIGNIFICANT CHANGE UP (ref 1.6–2.6)
MAGNESIUM SERPL-MCNC: 1.7 MG/DL — SIGNIFICANT CHANGE UP (ref 1.6–2.6)
MCHC RBC-ENTMCNC: 29.7 PG — SIGNIFICANT CHANGE UP (ref 27–34)
MCHC RBC-ENTMCNC: 33 % — SIGNIFICANT CHANGE UP (ref 32–36)
MCV RBC AUTO: 89.8 FL — SIGNIFICANT CHANGE UP (ref 80–100)
NRBC # FLD: 0 K/UL — SIGNIFICANT CHANGE UP (ref 0–0)
NT-PROBNP SERPL-SCNC: 65.41 PG/ML — SIGNIFICANT CHANGE UP
PCO2 BLDV: 38 MMHG — LOW (ref 41–51)
PH BLDV: 7.4 PH — SIGNIFICANT CHANGE UP (ref 7.32–7.43)
PHOSPHATE SERPL-MCNC: 5.1 MG/DL — HIGH (ref 2.5–4.5)
PHOSPHATE SERPL-MCNC: 5.2 MG/DL — HIGH (ref 2.5–4.5)
PHOSPHATE SERPL-MCNC: 5.4 MG/DL — HIGH (ref 2.5–4.5)
PLATELET # BLD AUTO: 243 K/UL — SIGNIFICANT CHANGE UP (ref 150–400)
PMV BLD: 10.7 FL — SIGNIFICANT CHANGE UP (ref 7–13)
PO2 BLDV: 67 MMHG — HIGH (ref 35–40)
POTASSIUM SERPL-MCNC: 3.4 MMOL/L — LOW (ref 3.5–5.3)
POTASSIUM SERPL-MCNC: 3.7 MMOL/L — SIGNIFICANT CHANGE UP (ref 3.5–5.3)
POTASSIUM SERPL-MCNC: 3.7 MMOL/L — SIGNIFICANT CHANGE UP (ref 3.5–5.3)
POTASSIUM SERPL-MCNC: 4 MMOL/L — SIGNIFICANT CHANGE UP (ref 3.5–5.3)
POTASSIUM SERPL-SCNC: 3.4 MMOL/L — LOW (ref 3.5–5.3)
POTASSIUM SERPL-SCNC: 3.7 MMOL/L — SIGNIFICANT CHANGE UP (ref 3.5–5.3)
POTASSIUM SERPL-SCNC: 3.7 MMOL/L — SIGNIFICANT CHANGE UP (ref 3.5–5.3)
POTASSIUM SERPL-SCNC: 4 MMOL/L — SIGNIFICANT CHANGE UP (ref 3.5–5.3)
PROT SERPL-MCNC: 6.5 G/DL — SIGNIFICANT CHANGE UP (ref 6–8.3)
PROT SERPL-MCNC: 6.8 G/DL — SIGNIFICANT CHANGE UP (ref 6–8.3)
PROTHROM AB SERPL-ACNC: 12.1 SEC — SIGNIFICANT CHANGE UP (ref 9.8–13.1)
PTH-INTACT SERPL-MCNC: 3.16 PG/ML — LOW (ref 15–65)
RBC # BLD: 5.12 M/UL — SIGNIFICANT CHANGE UP (ref 4.2–5.8)
RBC # FLD: 14.7 % — HIGH (ref 10.3–14.5)
SAO2 % BLDV: 92 % — HIGH (ref 60–85)
SODIUM SERPL-SCNC: 142 MMOL/L — SIGNIFICANT CHANGE UP (ref 135–145)
SODIUM SERPL-SCNC: 142 MMOL/L — SIGNIFICANT CHANGE UP (ref 135–145)
SODIUM SERPL-SCNC: 143 MMOL/L — SIGNIFICANT CHANGE UP (ref 135–145)
SODIUM SERPL-SCNC: 144 MMOL/L — SIGNIFICANT CHANGE UP (ref 135–145)
TRIGL SERPL-MCNC: 177 MG/DL — HIGH (ref 10–149)
TROPONIN T, HIGH SENSITIVITY: < 6 NG/L — SIGNIFICANT CHANGE UP (ref ?–14)
TSH SERPL-MCNC: 0.28 UIU/ML — SIGNIFICANT CHANGE UP (ref 0.27–4.2)
VIT B12 SERPL-MCNC: 311 PG/ML — SIGNIFICANT CHANGE UP (ref 200–900)
WBC # BLD: 7.31 K/UL — SIGNIFICANT CHANGE UP (ref 3.8–10.5)
WBC # FLD AUTO: 7.31 K/UL — SIGNIFICANT CHANGE UP (ref 3.8–10.5)

## 2019-11-13 PROCEDURE — 99223 1ST HOSP IP/OBS HIGH 75: CPT

## 2019-11-13 RX ORDER — LOSARTAN POTASSIUM 100 MG/1
50 TABLET, FILM COATED ORAL DAILY
Refills: 0 | Status: DISCONTINUED | OUTPATIENT
Start: 2019-11-14 | End: 2019-11-13

## 2019-11-13 RX ORDER — LOSARTAN POTASSIUM 100 MG/1
25 TABLET, FILM COATED ORAL ONCE
Refills: 0 | Status: COMPLETED | OUTPATIENT
Start: 2019-11-13 | End: 2019-11-13

## 2019-11-13 RX ORDER — CALCITRIOL 0.5 UG/1
1 CAPSULE ORAL
Qty: 30 | Refills: 0
Start: 2019-11-13 | End: 2019-12-12

## 2019-11-13 RX ORDER — CALCIUM GLUCONATE 100 MG/ML
2 VIAL (ML) INTRAVENOUS ONCE
Refills: 0 | Status: COMPLETED | OUTPATIENT
Start: 2019-11-13 | End: 2019-11-13

## 2019-11-13 RX ORDER — CALCITRIOL 0.5 UG/1
0.25 CAPSULE ORAL DAILY
Refills: 0 | Status: DISCONTINUED | OUTPATIENT
Start: 2019-11-13 | End: 2019-11-13

## 2019-11-13 RX ORDER — LOSARTAN POTASSIUM 100 MG/1
1 TABLET, FILM COATED ORAL
Qty: 0 | Refills: 0 | DISCHARGE
Start: 2019-11-13

## 2019-11-13 RX ORDER — LOSARTAN POTASSIUM 100 MG/1
1 TABLET, FILM COATED ORAL
Qty: 30 | Refills: 0
Start: 2019-11-13 | End: 2019-12-12

## 2019-11-13 RX ORDER — CALCITRIOL 0.5 UG/1
1 CAPSULE ORAL
Qty: 0 | Refills: 0 | DISCHARGE
Start: 2019-11-13

## 2019-11-13 RX ADMIN — CALCITRIOL 0.25 MICROGRAM(S): 0.5 CAPSULE ORAL at 12:28

## 2019-11-13 RX ADMIN — HEPARIN SODIUM 5000 UNIT(S): 5000 INJECTION INTRAVENOUS; SUBCUTANEOUS at 05:50

## 2019-11-13 RX ADMIN — Medication 1 TABLET(S): at 17:08

## 2019-11-13 RX ADMIN — CALCITRIOL 0.25 MICROGRAM(S): 0.5 CAPSULE ORAL at 01:41

## 2019-11-13 RX ADMIN — Medication 200 GRAM(S): at 01:42

## 2019-11-13 RX ADMIN — SODIUM CHLORIDE 3 MILLILITER(S): 9 INJECTION INTRAMUSCULAR; INTRAVENOUS; SUBCUTANEOUS at 05:20

## 2019-11-13 RX ADMIN — Medication 1 TABLET(S): at 12:28

## 2019-11-13 RX ADMIN — SODIUM CHLORIDE 3 MILLILITER(S): 9 INJECTION INTRAMUSCULAR; INTRAVENOUS; SUBCUTANEOUS at 12:29

## 2019-11-13 RX ADMIN — Medication 1 TABLET(S): at 06:43

## 2019-11-13 RX ADMIN — LOSARTAN POTASSIUM 25 MILLIGRAM(S): 100 TABLET, FILM COATED ORAL at 18:55

## 2019-11-13 RX ADMIN — HEPARIN SODIUM 5000 UNIT(S): 5000 INJECTION INTRAVENOUS; SUBCUTANEOUS at 12:27

## 2019-11-13 RX ADMIN — Medication 1 TABLET(S): at 01:42

## 2019-11-13 NOTE — CONSULT NOTE ADULT - ASSESSMENT
43M with PMHx of hypercalcemia, multiple kidney stones s/p removal, hyperparathyroidism s/p parathyroidectomy in 4/2019 and transcervical thymectomy & parathyroid adenoma excision on 10/24/19, who presents from outpatient CT surgery office (Dr Martinez) with acute symptomatic hypocalcemia, iCa 0.92. Endocrinology consulted for hypocalcemia.    #Hypocalcemia  Patient with recent parathyroid adenoma removal, now presenting with mildly symptomatic hypocalcemia with serum calcium down to 6.3, ionized Ca down to 0.83. Associated with low Mg, elevated phos; QTc not prolonged. Patient received IV Calcium gluconate 2g x 2, Magnesium sulfate 1g x 1, and started on PO Calcium carbonate 1250mg four times daily, Calcitriol 0.25mcg daily. This morning serum calcium improved to 6.9, ionized calcium to 0.89; patient reports no more paresthesias since last evening.  -Continue with PO Calcium carbonate 1250mg four times daily and Calcitriol 0.25mcg daily  -Goal serum Ca level >8    ***Preliminary note, to be discussed with attending. 43M with PMHx of hypercalcemia, multiple kidney stones s/p removal, hyperparathyroidism s/p parathyroidectomy in 4/2019 and transcervical thymectomy & parathyroid adenoma excision on 10/24/19, who presents from outpatient CT surgery office (Dr Martinez) with acute symptomatic hypocalcemia, iCa 0.92. Endocrinology consulted for hypocalcemia.    #Hypocalcemia  Patient with recent parathyroid adenoma removal, now presenting with mildly symptomatic hypocalcemia with serum calcium down to 6.3, ionized Ca down to 0.83. Associated with low Mg, elevated phos; QTc not prolonged. Patient received IV Calcium gluconate 2g x 2, Magnesium sulfate 1g x 1, and started on PO Calcium carbonate 1250mg four times daily, Calcitriol 0.25mcg daily. This morning serum calcium improved to 6.9, ionized calcium to 0.89; patient reports no more paresthesias since last evening.  -Continue with PO Calcium carbonate 1250mg four times daily and Calcitriol 0.25mcg daily    ***Preliminary note, to be discussed with attending. 43M with PMHx of hypercalcemia, multiple kidney stones s/p removal, hyperparathyroidism s/p parathyroidectomy in 4/2019 and transcervical thymectomy & parathyroid adenoma excision on 10/24/19, who presents from outpatient CT surgery office (Dr Martinez) with acute symptomatic hypocalcemia, iCa 0.92. Endocrinology consulted for hypocalcemia.    #Hypocalcemia  Patient with recent parathyroid adenoma removal, now presenting with mildly symptomatic hypocalcemia with serum calcium down to 6.3, ionized Ca down to 0.83. Associated with low Mg, elevated phos; QTc not prolonged. Patient received IV Calcium gluconate 2g x 2, Magnesium sulfate 1g x 1, and started on PO Calcium carbonate 1250mg/OsCal 500 four times per day, Calcitriol 0.25mcg daily. This morning serum calcium improved to 6.9, ionized calcium to 0.89; patient reports no more paresthesias since last evening.  -Continue with PO Calcium carbonate 1250mg/OsCal 500 four times per day and Calcitriol 0.25mcg daily    ***Preliminary note, to be discussed with attending. 43M with PMHx of hypercalcemia, multiple kidney stones s/p removal, hyperparathyroidism s/p parathyroidectomy in 4/2019 and transcervical thymectomy & parathyroid adenoma excision on 10/24/19, who presents from outpatient CT surgery office (Dr Martinez) with acute symptomatic hypocalcemia, iCa 0.92. Endocrinology consulted for hypocalcemia.    #Hypocalcemia  Patient with recent parathyroid adenoma removal, now presenting with mildly symptomatic hypocalcemia with serum calcium down to 6.3, ionized Ca down to 0.83. Associated with low Mg, elevated phos; QTc not prolonged. Patient received IV Calcium gluconate 2g x 2, Magnesium sulfate 1g x 1, and started on PO Calcium carbonate 1250mg/OsCal 500 four times per day, Calcitriol 0.25mcg daily. This morning serum calcium improved to 6.9, ionized calcium to 0.89; patient reports no more paresthesias since last evening.  -Continue with PO Calcium carbonate 1250mg/OsCal 500 four times per day and Calcitriol 0.25mcg daily for now  -Repeat BMP + Mg, Phos and check serum PTH now  -Pending results of above, will give final recommendations regarding dispo and dosages for calcium & calcitriol supplementation. If repeat serum Ca improved to ~7-8, will likely clear for discharge.  -Follow-up with endocrinology as outpatient.    Plan discussed with attending and patient.. 43M with PMHx of hypercalcemia, multiple kidney stones s/p removal, hyperparathyroidism s/p parathyroidectomy in 4/2019 and transcervical thymectomy & parathyroid adenoma excision on 10/24/19, who presents from outpatient CT surgery office (Dr Martinez) with acute symptomatic hypocalcemia, iCa 0.92. Endocrinology consulted for hypocalcemia.    #Hypocalcemia  Patient with recent parathyroid adenoma removal, now presenting with mildly symptomatic hypocalcemia with serum calcium down to 6.3, ionized Ca down to 0.83. Associated with low Mg, elevated phos; QTc not prolonged. Patient received IV Calcium gluconate 2g x 2, Magnesium sulfate 1g x 1, and started on PO Calcium carbonate 1250mg/OsCal 500 four times per day, Calcitriol 0.25mcg daily. This morning serum calcium 6.9, ionized calcium 0.89; patient reports no more paresthesias since last evening.  -Continue with PO Calcium carbonate 1250mg/OsCal 500 four times per day and Calcitriol 0.25mcg daily for now  -Repeat BMP + Mg, Phos and check serum PTH now  -Pending results of above, will give final recommendations regarding dispo and dosages for calcium & calcitriol supplementation. If repeat serum Ca improved to ~7-8, will likely clear for discharge.  -Follow-up with endocrinology as outpatient.    Plan discussed with attending and patient. 43M with PMHx of hypercalcemia, multiple kidney stones s/p removal, hyperparathyroidism s/p parathyroidectomy in 4/2019 and transcervical thymectomy & parathyroid adenoma excision on 10/24/19, who presents from outpatient CT surgery office (Dr Martinez) with acute symptomatic hypocalcemia, iCa 0.92. Endocrinology consulted for hypocalcemia.    #Hypocalcemia  Patient with recent parathyroid adenoma removal, now presenting with mildly symptomatic hypocalcemia with serum calcium down to 6.3, ionized Ca down to 0.83. Associated with low Mg, elevated phos; QTc not prolonged. Patient received IV Calcium gluconate 2g x 2, Magnesium sulfate 1g x 1, and started on PO Calcium carbonate 1250mg/OsCal 500 four times per day, Calcitriol 0.25mcg daily. This morning serum calcium 6.9, ionized calcium 0.89; patient reports no more paresthesias since last evening.  -Continue with PO Calcium carbonate 1250mg/OsCal 500 four times per day and Calcitriol 0.25mcg daily for now  -Repeat BMP + Mg, Phos and check serum PTH now  -Pending results of above, will give final recommendations regarding dispo and dosages for calcium & calcitriol supplementation. If repeat serum Ca improved to ~7-8, will likely clear for discharge.  -Follow-up with endocrinology as outpatient.    Plan discussed with attending and patient.    ADDENDUM:  Repeat Calcium 7.4, patient cleared for discharge on current regimen of PO Calcium Carbonate 1250mg/OsCalD 500 four times per day and Calcitriol 0.25 mcg daily. Follow-up with endocrinology as outpatient. 43M with PMHx of hypercalcemia, multiple kidney stones s/p removal, hyperparathyroidism s/p parathyroidectomy in 4/2019 and transcervical thymectomy & parathyroid adenoma excision on 10/24/19, who presents from outpatient CT surgery office (Dr Martinez) with acute symptomatic hypocalcemia, iCa 0.92. Endocrinology consulted for hypocalcemia.    #Hypocalcemia  Patient with recent parathyroid adenoma removal, now presenting with mildly symptomatic hypocalcemia with serum calcium down to 6.3, ionized Ca down to 0.83. Associated with low Mg, elevated phos; QTc not prolonged. Patient received IV Calcium gluconate 2g x 2, Magnesium sulfate 1g x 1, and started on PO Calcium carbonate 1250mg/OsCal 500 four times per day, Calcitriol 0.25mcg daily. This morning serum calcium 6.9, ionized calcium 0.89; patient reports no more paresthesias since last evening.  -Continue with PO Calcium carbonate 1250mg/OsCal 500 four times per day and Calcitriol 0.25mcg daily for now  -Repeat BMP + Mg, Phos and check serum PTH now  -Pending results of above, will give final recommendations regarding dispo and dosages for calcium & calcitriol supplementation. If repeat serum Ca improved to ~7-8, will likely clear for discharge.  -Follow-up with endocrinology as outpatient.    Plan discussed with attending and patient.    ADDENDUM:  Repeat Calcium 7.4, serum PTH low 3.16. Given improvement in patient's calcium levels with oral supplementation today, patient cleared for discharge from endocrinology standpoint. Continue on current regimen of PO Calcium Carbonate 1250mg/OsCalD 500 four times per day and Calcitriol 0.25 mcg daily. Follow-up with endocrinology as outpatient. 43M with PMHx of hypercalcemia, multiple kidney stones s/p removal, primary hyperparathyroidism s/p parathyroidectomy in 4/2019 and transcervical thymectomy & parathyroid adenoma excision on 10/24/19, who presents from outpatient CT surgery office (Dr Martinez) with acute symptomatic hypocalcemia, iCa 0.92. Endocrinology consulted for hypocalcemia.    #Hypocalcemia  Patient with recent parathyroid adenoma removal, now presenting with mildly symptomatic hypocalcemia with serum calcium down to 6.3, ionized Ca down to 0.83. Associated with low Mg, elevated phos; QTc not prolonged. Patient received IV Calcium gluconate 2g x 2, Magnesium sulfate 1g x 1, and started on PO Calcium carbonate 1250mg/OsCal 500 four times per day, Calcitriol 0.25mcg daily. This morning serum calcium 6.9, ionized calcium 0.89; patient reports no more paresthesias since last evening.  -Continue with PO Calcium carbonate 1250mg/OsCal 500 four times per day and Calcitriol 0.25mcg daily for now  -Repeat BMP + Mg, Phos and check serum PTH now  -Pending results of above, will give final recommendations regarding dispo and dosages for calcium & calcitriol supplementation. If repeat serum Ca improved to ~7-8, will likely clear for discharge.  -Follow-up with endocrinology as outpatient.    Plan discussed with attending and patient.    ADDENDUM:  Repeat Calcium 7.4, serum PTH low 3.16. Given improvement in patient's calcium levels with oral supplementation today, patient cleared for discharge from endocrinology standpoint. Continue on current regimen of PO Calcium Carbonate 1250mg/OsCalD 500 four times per day and Calcitriol 0.25 mcg daily. Follow-up with endocrinology as outpatient.

## 2019-11-13 NOTE — DISCHARGE NOTE PROVIDER - CARE PROVIDER_API CALL
Sekou Langley)  Internal Medicine  [address] [address] [place] [address]  Phone: [phone]  Fax: [phone]  [facility] Patient  Follow Up Time: 1-3 days    Ya Hayward)  EndocrinologyMetabDiabetes; Internal Medicine  [address] [place] [address]  Phone: [phone]  Fax: [phone]  Follow Up Time: 1 week

## 2019-11-13 NOTE — PROGRESS NOTE ADULT - SUBJECTIVE AND OBJECTIVE BOX
pt seen in office yesterday by Dr Martinez for routine f/u POD#20 s/p cervical parathyroidectomy c/o tingling of hands and face. Sent to ED and found to be hypocalcemic. today pt states he feels well, tingling has stopped.    Vital Signs Last 24 Hrs  T(C): 36.7 (13 Nov 2019 13:56), Max: 36.8 (12 Nov 2019 19:57)  T(F): 98.1 (13 Nov 2019 13:56), Max: 98.2 (12 Nov 2019 19:57)  HR: 96 (13 Nov 2019 13:56) (74 - 96)  BP: 133/97 (13 Nov 2019 13:56) (116/102 - 133/97)  BP(mean): --  RR: 18 (13 Nov 2019 13:56) (18 - 18)  SpO2: 98% (13 Nov 2019 13:56) (96% - 100%)    MEDICATIONS  (STANDING):  calcitriol   Capsule 0.25 MICROGram(s) Oral daily  calcium carbonate 1250 mG  + Vitamin D (OsCal 500 + D) 1 Tablet(s) Oral four times a day  heparin  Injectable 5000 Unit(s) SubCutaneous every 8 hours  losartan 25 milliGRAM(s) Oral daily  sodium chloride 0.9% lock flush 3 milliLiter(s) IV Push every 8 hours    MEDICATIONS  (PRN):      11-13    142  |  106  |  18  ----------------------------<  108<H>  3.7   |  20<L>  |  0.91    Ca    6.9<L>      13 Nov 2019 06:00  Phos  5.4     11-13  Mg     1.6     11-13    TPro  6.8  /  Alb  3.7  /  TBili  0.4  /  DBili  x   /  AST  13  /  ALT  19  /  AlkPhos  111  11-13    wound clean and dry

## 2019-11-13 NOTE — PROGRESS NOTE ADULT - SUBJECTIVE AND OBJECTIVE BOX
Patient is a 44y old  Male who presents with a chief complaint of Hypocalcemia (13 Nov 2019 10:37)      INTERVAL HPI/OVERNIGHT EVENTS:  T(C): 36.8 (11-13-19 @ 05:50), Max: 36.8 (11-12-19 @ 19:57)  HR: 86 (11-13-19 @ 05:50) (74 - 88)  BP: 130/90 (11-13-19 @ 05:50) (116/102 - 159/112)  RR: 18 (11-13-19 @ 05:50) (18 - 18)  SpO2: 98% (11-13-19 @ 05:50) (96% - 100%)  Wt(kg): --  I&O's Summary      LABS:                        15.2   7.31  )-----------( 243      ( 13 Nov 2019 06:00 )             46.0     11-13    142  |  106  |  18  ----------------------------<  108<H>  3.7   |  20<L>  |  0.91    Ca    6.9<L>      13 Nov 2019 06:00  Phos  5.4     11-13  Mg     1.6     11-13    TPro  6.8  /  Alb  3.7  /  TBili  0.4  /  DBili  x   /  AST  13  /  ALT  19  /  AlkPhos  111  11-13    PT/INR - ( 13 Nov 2019 06:00 )   PT: 12.1 SEC;   INR: 1.09          PTT - ( 13 Nov 2019 06:00 )  PTT:30.5 SEC    CAPILLARY BLOOD GLUCOSE              REVIEW OF SYSTEMS:  CONSTITUTIONAL: No fever, weight loss, or fatigue  EYES: No eye pain, visual disturbances, or discharge  ENMT:  No difficulty hearing, tinnitus, vertigo; No sinus or throat pain  NECK: No pain or stiffness  RESPIRATORY: No cough, wheezing, chills or hemoptysis; No shortness of breath  CARDIOVASCULAR: No chest pain, palpitations, dizziness, or leg swelling  GASTROINTESTINAL: No abdominal or epigastric pain. No nausea, vomiting, or hematemesis; No diarrhea or constipation. No melena or hematochezia.  GENITOURINARY: No dysuria, frequency, hematuria, or incontinence  NEUROLOGICAL: No headaches, memory loss, No more tingling or numbness.  SKIN: No itching, burning, rashes, or lesions   LYMPH NODES: No enlarged glands  ENDOCRINE: No heat or cold intolerance; No hair loss  MUSCULOSKELETAL: No joint pain or swelling; No muscle, back, or extremity pain  PSYCHIATRIC: No depression, anxiety, mood swings, or difficulty sleeping  HEME/LYMPH: No easy bruising, or bleeding gums  ALLERY AND IMMUNOLOGIC: No hives or eczema    RADIOLOGY & ADDITIONAL TESTS:    Imaging Personally Reviewed:  [ ] YES  [ ] NO    Consultant(s) Notes Reviewed:  [ ] YES  [ ] NO    PHYSICAL EXAM:  GENERAL: NAD, well-groomed, well-developed  HEAD:  Atraumatic, Normocephalic, neck scar.  EYES: EOMI, PERRLA, conjunctiva and sclera clear  ENMT: No tonsillar erythema, exudates, or enlargement; Moist mucous membranes, Good dentition, No lesions  NECK: Supple, No JVD, Normal thyroid  NERVOUS SYSTEM:  Alert & Oriented X3, Good concentration; Motor Strength 5/5 B/L upper and lower extremities; DTRs 2+ intact and symmetric  CHEST/LUNG: Clear to percussion bilaterally; No rales, rhonchi, wheezing, or rubs  HEART: Regular rate and rhythm; No murmurs, rubs, or gallops  ABDOMEN: Soft, Nontender, Nondistended; Bowel sounds present  EXTREMITIES:  2+ Peripheral Pulses, No clubbing, cyanosis, or edema  LYMPH: No lymphadenopathy noted  SKIN: No rashes or lesions    Care Discussed with Consultants/Other Providers [ ] YES  [ ] NO    calcitriol   Capsule 0.25 MICROGram(s) Oral daily  calcium carbonate 1250 mG  + Vitamin D (OsCal 500 + D) 1 Tablet(s) Oral four times a day  heparin  Injectable 5000 Unit(s) SubCutaneous every 8 hours  losartan 25 milliGRAM(s) Oral daily  sodium chloride 0.9% lock flush 3 milliLiter(s) IV Push every 8 hours      A/P  symptomatic hypocalcemia, h/o parathyroidectomy, h/o Thymectomy, Hypomagnesemia, Hyperphosphatemai         H/O Multiple renal stones, h/o hypercalcemia in past.         HTN ( in denial for HTN)         Obesity    Final Endocrinology consult to follow.  Continue Caco3,Oscal, Calcitriol    If Endocrinology agrees, may be discharge on above medications since asyptomatic, may follow calcium as out patient.      May call me for any further questions      Sekou Langley MD    [phone].

## 2019-11-13 NOTE — DISCHARGE NOTE PROVIDER - NSDCMRMEDTOKEN_GEN_ALL_CORE_FT
calcitriol 0.25 mcg oral capsule: 1 cap(s) orally once a day  calcium-vitamin D 500 mg-200 intl units oral tablet: 1 tab(s) orally 4 times a day  losartan 50 mg oral tablet: 1 tab(s) orally once a day

## 2019-11-13 NOTE — PROGRESS NOTE ADULT - ASSESSMENT
POD#20 s/p cervical parathyroidectomy c/o tingling of hands and face. Sent to ED and found to be hypocalcemic. today pt states he feels well, tingling has stopped. Continue calcium supplements and monitoring per medicine.

## 2019-11-13 NOTE — PROGRESS NOTE ADULT - PROBLEM SELECTOR PLAN 1
Continue calcium supplements and monitoring per medicine.        Continue calcium supplements and monitoring per medicine.

## 2019-11-13 NOTE — DISCHARGE NOTE PROVIDER - NSDCCPCAREPLAN_GEN_ALL_CORE_FT
PRINCIPAL DISCHARGE DIAGNOSIS  Diagnosis: Hypocalcemia  Assessment and Plan of Treatment: to prevent symptoms please continue to take medication as prescribed, and to follow up with endocrinologist with in 1 week      SECONDARY DISCHARGE DIAGNOSES  Diagnosis: Hypertension  Assessment and Plan of Treatment: To maintain a normal blood pressure by taking your medication as perscribed to prevent heart attack, stroke and renal failure   please take medication as perscribed, and monitor BP readings daily  follow up with Dr. Langley with in 1-3 days

## 2019-11-13 NOTE — DISCHARGE NOTE PROVIDER - PROVIDER TOKENS
PROVIDER:[TOKEN:[[id number]],FOLLOWUP:[1-3 days],ESTABLISHEDPATIENT:[T]],PROVIDER:[TOKEN:[[id number]],FOLLOWUP:[1 week]]

## 2019-11-13 NOTE — DISCHARGE NOTE PROVIDER - CARE PROVIDERS DIRECT ADDRESSES
[email or web address]

## 2019-11-13 NOTE — DISCHARGE NOTE PROVIDER - HOSPITAL COURSE
HPI: 42 yo male with pmhx of multiple kidney stones, s/p kidney stone removals, hypercalcemia & hyperparathyroidism (s/p parathyroidectomy in 4/2019), and obesity sent to the ED by CT surgery office (Dr. Martinez) for outpatient labs demonstrated hypocalcemia (iCal 0.9 per HIE). As per patient he has been experiencing worsening intermittent distal extremity numbness and tingling, and ringing in his head for >1 week. Pt denies chest pain, SOB, fevers, chills, difficulty swallowing, muscle weakness.     EKG- NSR with T-wave inversion in lead 3, no QTC prolongations noted. HR 73 Qtc 473        Hospital Course:          Problem/Plan - 1:    ·  Problem: Hypocalcemia.  Plan:     - ADMIT tele     - am labs ordered     - CALCIUM- 6.6, Ionized Ca 0.83    - s/p supplementation flu repeat ca levels 2200    - EKG - NSR with T-wave inversion in lead 3, no QTC prolongations noted. HR 73 Qtc 473    - endo consult recommendation: to continue current regimen, patient repeat Calcium 7.4, serum PTH low 3.16. Given improvement in patient's calcium levels with oral supplementation today, patient cleared for discharge from endocrinology standpoint. To follow up with Endocrinology as an Outpatient with in 1-2 weeks.     - plan discussed with attending [employer].          Problem/Plan - 2: resolved     ·  Problem: Hypomagnesemia.  Plan: - Mg level 1.5     - 1 gram of Mag sulfate ordered     - repeat magnesium 1.7          Problem/Plan - 3:    ·  Problem: Hyperphosphatemia.  Plan:     - Phos level 5.7    - likely 2/2 hypocalcemia     - repeat Phos 5.1     - will monitor for now    - if no improvement will consider other treatment options.          Problem/Plan - 4:    ·  Problem: Hypertension.  Plan:     - patient with hx of elevated BP readings however is noncompliant with medications     - will start on low dose losartan 25mg daily with hold parameters for now and adjust as needed     - low salt diet.     - 11/13- BP reading still elevated, losartan daily dose increased to 50mg.         Patient requesting to be discharged, Discussed the risk of leaving against medical advice, Patient is A&O x 3 and states that he will be compliant with medications as Prescribed.         Called by nurse to evaluate patient who wishes to leave the hospital against medical advice. Patient is A&O x3 and has full capacity to make his or her own medical decisions. Pt was informed of their medical conditions, benefits, and alternatives to treatment as well as the risks of refusing treatment and the seriousness of leaving against medical advice such as the risks of heart attack, stroke, seizure, and even death were fully explained to the patient.  After expressing full understanding, patient then signs out against medical advice witnessed by the nurse.  Attending Discussed with Attending Dr. Langley. HPI: 42 yo male with pmhx of multiple kidney stones, s/p kidney stone removals, hypercalcemia & hyperparathyroidism (s/p parathyroidectomy in 4/2019), and obesity sent to the ED by CT surgery office (Dr. Martinez) for outpatient labs demonstrated hypocalcemia (iCal 0.9 per HIE). As per patient he has been experiencing worsening intermittent distal extremity numbness and tingling, and ringing in his head for >1 week. Pt denies chest pain, SOB, fevers, chills, difficulty swallowing, muscle weakness.     EKG- NSR with T-wave inversion in lead 3, no QTC prolongations noted. HR 73 Qtc 473        Hospital Course:          Problem/Plan - 1:    ·  Problem: Hypocalcemia.  Plan:     - ADMIT tele     - am labs ordered     - CALCIUM- 6.6, Ionized Ca 0.83    - s/p supplementation flu repeat ca levels 2200    - EKG - NSR with T-wave inversion in lead 3, no QTC prolongations noted. HR 73 Qtc 473    - endo consult recommendation: to continue current regimen, patient repeat Calcium 7.4, serum PTH low 3.16. Given improvement in patient's calcium levels with oral supplementation today, patient cleared for discharge from endocrinology standpoint. To follow up with Endocrinology as an Outpatient with in 1-2 weeks.     - plan discussed with attending [employer].          Problem/Plan - 2: resolved     ·  Problem: Hypomagnesemia.  Plan: - Mg level 1.5     - 1 gram of Mag sulfate ordered     - repeat magnesium 1.7          Problem/Plan - 3:    ·  Problem: Hyperphosphatemia.  Plan:     - Phos level 5.7    - likely 2/2 hypocalcemia     - repeat Phos 5.1     - will monitor for now    - if no improvement will consider other treatment options.          Problem/Plan - 4:    ·  Problem: Hypertension.  Plan:     - patient with hx of elevated BP readings however is noncompliant with medications     - will start on low dose losartan 25mg daily with hold parameters for now and adjust as needed     - low salt diet.     - 11/13- BP reading still elevated, losartan daily dose increased to 50mg.         Patient requesting to be discharged, Discussed the risk of leaving against medical advice, Patient is A&O x 3 and states that he will be compliant with medications as Prescribed.         Called by GERSON Ferrer to evaluate patient who wishes to leave the hospital against medical advice. Patient is A&O x3 and has full capacity to make his or her own medical decisions. Pt was informed of their medical conditions, benefits, and alternatives to treatment as well as the risks of refusing treatment and the seriousness of leaving against medical advice such as the risks of heart attack, stroke, seizure, and even death were fully explained to the patient.  After expressing full understanding, patient then refused to signs paper work out against medical advice witnessed by the nurse.  Attending Discussed with Attending Dr. Langley.

## 2019-11-13 NOTE — DISCHARGE NOTE NURSING/CASE MANAGEMENT/SOCIAL WORK - PATIENT PORTAL LINK FT
You can access the FollowMyHealth Patient Portal offered by [facility] by registering at the following website: [email or web address]. By joining [employer]’s FollowMyHealth portal, you will also be able to view your health information using other applications (apps) compatible with our system.

## 2019-11-13 NOTE — CONSULT NOTE ADULT - SUBJECTIVE AND OBJECTIVE BOX
HPI:  42 yo male with pmhx of multiple kidney stones, s/p kidney stone removals, hypercalcemia & hyperparathyroidism (s/p parathyroidectomy in 4/2019), and obesity sent to the ED by CT surgery office (Dr. Martinez) for outpatient labs demonstrated hypocalcemia (iCal 0.9 per HIE). As per patient he has been experiencing worsening intermittent distal extremity numbness and tingling, and ringing in his head for >1 week. Pt denies chest pain, SOB, fevers, chills, difficulty swallowing, muscle weakness.     EKG- NSR with T-wave inversion in lead 3, no QTC prolongations noted. HR 73 Qtc 473 (12 Nov 2019 18:54)    Patient evaluated in [facility]. Patient sent to ED from outpatient CT surgery office for hypocalcemia.  He reports that he had initial parathyroidectomy in 4/2019, but then continued to have high calcium levels and kidney stones. He had repeat procedure two weeks ago for removal of parathyroid nodule and was recovering well since then until 1 week ago. He then began experiencing intermittent numbness and tingling across his face, bilateral hands, and bilateral feet. He would temporarily be unable to use his hands properly during these episodes but as it did not significantly impact his daily life he did not think much of these symptoms. He reports that he had been taking Tums immediately post-operatively, initially around 3 tabs per day and then 1 tab daily, but had been told after a few days that he did not to take it anymore so he stopped. He presented to follow-up appointment with outpatient CT surgeon and then was found to have low calcium (iCa++ 0.92) and was sent to ED. Otherwise patient states that he feels fine; denies chest pain, SOB, palpitations, abd pain, N/V, weakness. Since in ED, patient has received Calcium gluconate 2g x 2, Magnesium sulfate 1g x 1, and started on Calcitriol 0.25mcg daily and Calcium carbonate 1250mg qid. Since last night, patient has not had any more paresthesias and states that he feels well.        PAST MEDICAL & SURGICAL HISTORY:  H/O urinary frequency  History of dysuria  Hematuria  Obesity  Hyperparathyroidism  Kidney stones  Kidney stone: s/p percutaneous stone removal (8/2019)  H/O cystoscopy: multiple recent cystoscopy with percutaneous stone removal  in 9/2019  H/O parathyroidectomy: 5/2019  History of urethral stent: x2, S/P nephrostolithotomy  ***Most recently stents  placed Memorial Day Weekend 2019  done at [facility]  H/O right knee surgery: 1997      FAMILY HISTORY:  No pertinent family history in first degree relatives      Social History: Former smoker.    Outpatient Medications:  -Reports no regular medications or supplements.    MEDICATIONS  (STANDING):  calcitriol   Capsule 0.25 MICROGram(s) Oral daily  calcium carbonate 1250 mG  + Vitamin D (OsCal 500 + D) 1 Tablet(s) Oral four times a day  heparin  Injectable 5000 Unit(s) SubCutaneous every 8 hours  losartan 25 milliGRAM(s) Oral daily  sodium chloride 0.9% lock flush 3 milliLiter(s) IV Push every 8 hours    MEDICATIONS  (PRN):      Allergies    No Known Allergies    Intolerances      Review of Systems:  Constitutional: No fever  Eyes: No blurry vision  Neuro: No tremors. +paresthesias, improved.  HEENT: No pain  Cardiovascular: No chest pain, palpitations  Respiratory: No SOB, no cough  GI: No nausea, vomiting, abdominal pain  : No dysuria  Skin: no rash  Psych: no depression  Endocrine: no polyuria, polydipsia  Hem/lymph: no swelling  Osteoporosis: no fractures    ALL OTHER SYSTEMS REVIEWED AND NEGATIVE    UNABLE TO OBTAIN    PHYSICAL EXAM:  VITALS: T(C): 36.8 (11-13-19 @ 05:50)  T(F): 98.2 (11-13-19 @ 05:50), Max: 98.2 (11-12-19 @ 19:57)  HR: 86 (11-13-19 @ 05:50) (74 - 88)  BP: 130/90 (11-13-19 @ 05:50) (116/102 - 159/112)  RR:  (18 - 18)  SpO2:  (96% - 100%)  Wt(kg): --  GENERAL: NAD, well-groomed, well-developed  EYES: No proptosis, no lid lag, anicteric  HEENT:  Atraumatic, Normocephalic, moist mucous membranes. Negative Chvostek sign.  NECK/THYROID: Well-healing incision site. Normal size, no palpable nodules  RESPIRATORY: Clear to auscultation bilaterally; No rales, rhonchi, wheezing  CARDIOVASCULAR: Regular rate and rhythm; No murmurs; no peripheral edema  GI: Soft, nontender, non distended, normal bowel sounds  SKIN: Dry, intact, No rashes or lesions  MUSCULOSKELETAL: Full range of motion, normal strength  NEURO: sensation intact, extraocular movements intact, no tremor  PSYCH: Alert and oriented x 3, normal affect, normal mood  CUSHING'S SIGNS: no striae      CAPILLARY BLOOD GLUCOSE                                15.2   7.31  )-----------( 243      ( 13 Nov 2019 06:00 )             46.0       11-13    142  |  106  |  18  ----------------------------<  108<H>  3.7   |  20<L>  |  0.91    EGFR if : 119  EGFR if non : 103    Ca    6.9<L>      11-13  Mg     1.6     11-13  Phos  5.4     11-13    TPro  6.8  /  Alb  3.7  /  TBili  0.4  /  DBili  x   /  AST  13  /  ALT  19  /  AlkPhos  111  11-13      Thyroid Function Tests:  11-13 @ 06:00 TSH 0.28 FreeT4 -- T3 -- Anti TPO -- Anti Thyroglobulin Ab -- TSI --  11-12 @ 14:51 TSH 0.46 FreeT4 -- T3 -- Anti TPO -- Anti Thyroglobulin Ab -- TSI --      Hemoglobin A1C, Whole Blood: 5.6 % [4.0 - 5.6] (11-13-19 @ 06:00)      11-13 Chol 196  HDL 22<L> Trig 177<H>    Radiology:       < from: Xray Chest 2 Views PA/Lat (11.12.19 @ 11:33) >  XAM:  XR CHEST PA LAT 2V        PROCEDURE DATE:  Nov 12 2019         INTERPRETATION:  CLINICAL INFORMATION: Postop resection of ectopic   parathyroid adenoma    EXAM: PA and lateral chest radiographs    COMPARISON: AP portable Chest radiograph from 10/18/2017    FINDINGS:  Surgical clips noted overlying the lower rib neck/upper chest The   cardiomediastinal silhouette is unremarkable. The lungs are clear. No   pleural effusion or pneumothorax.  The visualized osseous structures demonstrate no acute pathology.    IMPRESSION:  Clear lungs. No pneumothorax.    < end of copied text > HPI:  44 yo male with pmhx of multiple kidney stones, s/p kidney stone removals, hypercalcemia & hyperparathyroidism (s/p parathyroidectomy in 4/2019), and obesity sent to the ED by CT surgery office (Dr. Martinez) for outpatient labs demonstrated hypocalcemia (iCal 0.9 per HIE). As per patient he has been experiencing worsening intermittent distal extremity numbness and tingling, and ringing in his head for >1 week. Pt denies chest pain, SOB, fevers, chills, difficulty swallowing, muscle weakness.     EKG- NSR with T-wave inversion in lead 3, no QTC prolongations noted. HR 73 Qtc 473.    Patient evaluated in [facility]. Patient sent to ED from outpatient CT surgery office for hypocalcemia.  He reports that he had initial parathyroidectomy in 4/2019, but then continued to have high calcium levels and kidney stones. He had repeat procedure two weeks ago for removal of parathyroid nodule and was recovering well since then until 1 week ago. He then began experiencing intermittent numbness and tingling across his face, bilateral hands, and bilateral feet. He would temporarily be unable to use his hands properly during these episodes but as it did not significantly impact his daily life he did not think much of these symptoms. He reports that he had been taking Tums immediately post-operatively, initially around 3 tabs per day and then 1 tab daily, but had been told after a few days that he did not to take it anymore so he stopped. He presented to follow-up appointment with outpatient CT surgeon and then was found to have low calcium (iCa++ 0.92) and was sent to ED. Otherwise patient states that he feels fine; denies chest pain, SOB, palpitations, abd pain, N/V, weakness. Since in ED, patient has received Calcium gluconate 2g x 2, Magnesium sulfate 1g x 1, and started on Calcitriol 0.25mcg daily and Calcium carbonate 1250mg qid. Since last night, patient has not had any more paresthesias and states that he feels well.        PAST MEDICAL & SURGICAL HISTORY:  H/O urinary frequency  History of dysuria  Hematuria  Obesity  Hyperparathyroidism  Kidney stones  Kidney stone: s/p percutaneous stone removal (8/2019)  H/O cystoscopy: multiple recent cystoscopy with percutaneous stone removal  in 9/2019  H/O parathyroidectomy: 5/2019  History of urethral stent: x2, S/P nephrostolithotomy  ***Most recently stents  placed Memorial Day Weekend 2019  done at [facility]  H/O right knee surgery: 1997      FAMILY HISTORY:  No pertinent family history in first degree relatives      Social History: Former smoker.    Outpatient Medications:  -Reports no regular medications or supplements.    MEDICATIONS  (STANDING):  calcitriol   Capsule 0.25 MICROGram(s) Oral daily  calcium carbonate 1250 mG  + Vitamin D (OsCal 500 + D) 1 Tablet(s) Oral four times a day  heparin  Injectable 5000 Unit(s) SubCutaneous every 8 hours  losartan 25 milliGRAM(s) Oral daily  sodium chloride 0.9% lock flush 3 milliLiter(s) IV Push every 8 hours    MEDICATIONS  (PRN):      Allergies    No Known Allergies    Intolerances      Review of Systems:  Constitutional: No fever  Eyes: No blurry vision  Neuro: No tremors. +paresthesias, improved.  HEENT: No pain  Cardiovascular: No chest pain, palpitations  Respiratory: No SOB, no cough  GI: No nausea, vomiting, abdominal pain  : No dysuria  Skin: no rash  Psych: no depression  Endocrine: no polyuria, polydipsia  Hem/lymph: no swelling  Osteoporosis: no fractures    ALL OTHER SYSTEMS REVIEWED AND NEGATIVE      PHYSICAL EXAM:  VITALS: T(C): 36.8 (11-13-19 @ 05:50)  T(F): 98.2 (11-13-19 @ 05:50), Max: 98.2 (11-12-19 @ 19:57)  HR: 86 (11-13-19 @ 05:50) (74 - 88)  BP: 130/90 (11-13-19 @ 05:50) (116/102 - 159/112)  RR:  (18 - 18)  SpO2:  (96% - 100%)  Wt(kg): --  GENERAL: NAD, well-groomed, well-developed  EYES: No proptosis, no lid lag, anicteric  HEENT:  Atraumatic, Normocephalic, moist mucous membranes. Negative Chvostek sign.  NECK/THYROID: Well-healing incision site. Normal size, no palpable nodules  RESPIRATORY: Clear to auscultation bilaterally; No rales, rhonchi, wheezing  CARDIOVASCULAR: Regular rate and rhythm; No murmurs; no peripheral edema  GI: Soft, nontender, non distended, normal bowel sounds  SKIN: Dry, intact, No rashes or lesions  MUSCULOSKELETAL: Full range of motion, normal strength  NEURO: sensation intact, extraocular movements intact, no tremor  PSYCH: Alert and oriented x 3, normal affect, normal mood  CUSHING'S SIGNS: no striae      CAPILLARY BLOOD GLUCOSE                                15.2   7.31  )-----------( 243      ( 13 Nov 2019 06:00 )             46.0       11-13    142  |  106  |  18  ----------------------------<  108<H>  3.7   |  20<L>  |  0.91    EGFR if : 119  EGFR if non : 103    Ca    6.9<L>      11-13  Mg     1.6     11-13  Phos  5.4     11-13    TPro  6.8  /  Alb  3.7  /  TBili  0.4  /  DBili  x   /  AST  13  /  ALT  19  /  AlkPhos  111  11-13      Thyroid Function Tests:  11-13 @ 06:00 TSH 0.28 FreeT4 -- T3 -- Anti TPO -- Anti Thyroglobulin Ab -- TSI --  11-12 @ 14:51 TSH 0.46 FreeT4 -- T3 -- Anti TPO -- Anti Thyroglobulin Ab -- TSI --      Hemoglobin A1C, Whole Blood: 5.6 % [4.0 - 5.6] (11-13-19 @ 06:00)      11-13 Chol 196  HDL 22<L> Trig 177<H>    Radiology:       < from: Xray Chest 2 Views PA/Lat (11.12.19 @ 11:33) >  XAM:  XR CHEST PA LAT 2V        PROCEDURE DATE:  Nov 12 2019         INTERPRETATION:  CLINICAL INFORMATION: Postop resection of ectopic   parathyroid adenoma    EXAM: PA and lateral chest radiographs    COMPARISON: AP portable Chest radiograph from 10/18/2017    FINDINGS:  Surgical clips noted overlying the lower rib neck/upper chest The   cardiomediastinal silhouette is unremarkable. The lungs are clear. No   pleural effusion or pneumothorax.  The visualized osseous structures demonstrate no acute pathology.    IMPRESSION:  Clear lungs. No pneumothorax.    < end of copied text >

## 2019-11-14 ENCOUNTER — INBOUND DOCUMENT (OUTPATIENT)
Age: 44
End: 2019-11-14

## 2019-11-15 DIAGNOSIS — Z71.89 OTHER SPECIFIED COUNSELING: ICD-10-CM

## 2019-11-17 LAB — PTH RELATED PROT SERPL-MCNC: <2 — SIGNIFICANT CHANGE UP

## 2019-11-21 ENCOUNTER — APPOINTMENT (OUTPATIENT)
Dept: ENDOCRINOLOGY | Facility: CLINIC | Age: 44
End: 2019-11-21
Payer: MEDICAID

## 2019-11-21 VITALS
HEART RATE: 112 BPM | DIASTOLIC BLOOD PRESSURE: 80 MMHG | BODY MASS INDEX: 37.28 KG/M2 | OXYGEN SATURATION: 98 % | SYSTOLIC BLOOD PRESSURE: 130 MMHG | WEIGHT: 246 LBS | HEIGHT: 68 IN

## 2019-11-21 DIAGNOSIS — E66.9 OBESITY, UNSPECIFIED: ICD-10-CM

## 2019-11-21 DIAGNOSIS — Z00.00 ENCOUNTER FOR GENERAL ADULT MEDICAL EXAMINATION W/OUT ABNORMAL FINDINGS: ICD-10-CM

## 2019-11-21 PROCEDURE — 99215 OFFICE O/P EST HI 40 MIN: CPT

## 2019-11-21 RX ORDER — LOSARTAN POTASSIUM 100 MG/1
TABLET, FILM COATED ORAL
Refills: 0 | Status: ACTIVE | COMMUNITY

## 2019-11-22 PROBLEM — Z00.00 ENCOUNTER FOR PREVENTIVE HEALTH EXAMINATION: Status: ACTIVE | Noted: 2017-09-28

## 2019-11-22 PROBLEM — E66.9 OBESITY WITHOUT SERIOUS COMORBIDITY, UNSPECIFIED CLASSIFICATION, UNSPECIFIED OBESITY TYPE: Status: ACTIVE | Noted: 2019-11-22

## 2019-11-22 LAB
25(OH)D3 SERPL-MCNC: 19.4 NG/ML
ALBUMIN SERPL ELPH-MCNC: 4 G/DL
ALP BLD-CCNC: 118 U/L
ALT SERPL-CCNC: 28 U/L
ANION GAP SERPL CALC-SCNC: 15 MMOL/L
AST SERPL-CCNC: 17 U/L
BILIRUB SERPL-MCNC: 0.2 MG/DL
BUN SERPL-MCNC: 22 MG/DL
CA-I SERPL-SCNC: 1.08 MMOL/L
CALCIUM SERPL-MCNC: 8 MG/DL
CALCIUM SERPL-MCNC: 8 MG/DL
CHLORIDE SERPL-SCNC: 107 MMOL/L
CHOLEST SERPL-MCNC: 194 MG/DL
CHOLEST/HDLC SERPL: 6.3 RATIO
CO2 SERPL-SCNC: 23 MMOL/L
CREAT SERPL-MCNC: 1.08 MG/DL
ESTIMATED AVERAGE GLUCOSE: 120 MG/DL
GLUCOSE SERPL-MCNC: 127 MG/DL
HBA1C MFR BLD HPLC: 5.8 %
HDLC SERPL-MCNC: 31 MG/DL
LDLC SERPL CALC-MCNC: 128 MG/DL
MAGNESIUM SERPL-MCNC: 1.7 MG/DL
PARATHYROID HORMONE INTACT: 4 PG/ML
PHOSPHATE SERPL-MCNC: 4.8 MG/DL
POTASSIUM SERPL-SCNC: 4.4 MMOL/L
PROT SERPL-MCNC: 6.5 G/DL
SODIUM SERPL-SCNC: 145 MMOL/L
TRIGL SERPL-MCNC: 176 MG/DL

## 2019-11-25 ENCOUNTER — APPOINTMENT (OUTPATIENT)
Dept: ENDOCRINOLOGY | Facility: CLINIC | Age: 44
End: 2019-11-25

## 2019-12-19 NOTE — CHART NOTE - NSCHARTNOTEFT_GEN_A_CORE
Called by nurse to evaluate patient who wishes to leave the hospital against medical advice. Patient is A&O x3 and has full capacity to make his or her own medical decisions. Pt was informed of their medical conditions, benefits, and alternatives to treatment as well as the risks of refusing treatment and the seriousness of leaving against medical advice such as the risks of heart attack, stroke, seizure, and even death were fully explained to the patient.  After expressing full understanding, patient then refuses to sign respecting forms for leaving against medical advice witnessed by the RN Carissa. Attending made aware.
Pt very eager to be discharged today. Repeat calcium level is 7.4 (improving with supplementation, was 6.3-->6.9), case discussed with endocrine Dr. Morejon, ok with Pt being discharged from endocrine perspective as level is improving and Pt agreeable to continue supplementation and outpt follow up within 1 week. While discussing discharge with Pt, RN repeated BP which was 143/110mmhg. Pt is resting comfortably without complaints, no headache, dizziness, lightheadedness, change in vision, CP, SOB. Pt had recent echo in sunrise which was reviewed, normal LVSF with EF 55-60%, mild diastolic dysfunction. Case discussed with Dr. Langley, recommend to increase Losartan from 25mg daily to 50mg daily, administer 25mg now in addition to AM dose, if BP improving ok with Pt being discharged today with outpt follow up with Dr. Langley and endocrine within 1 week. Pt understands and agrees to plan, all questions answered.
Reviewing labs from 9/27/19, There is no clinical significance to the rise of creatinine based on surgical procedure addressing stones in both kidneys, and subsequent rapid return to normal over the following 2 days.
pt is s/p Cervical parathyroidectomy 10/24/19 with thoracic surgeon Dr Martinez  presents to ER with symptomatic hypocalcemia  recommend admit to medical service and endocrine consult  will follow as consult    Billie FISHMAN [id number]
repeat labs:   11-12    143  |  107  |  18  ----------------------------<  125<H>  3.4<L>   |  24  |  0.95    Ca    6.3<LL>      12 Nov 2019 22:50  Phos  5.2     11-12  Mg     1.7     11-12    TPro  6.5  /  Alb  3.6  /  TBili  0.4  /  DBili  x   /  AST  13  /  ALT  16  /  AlkPhos  103  11-12    Calcium, Ionized (11.12.19 @ 22:50)    Calcium, Ionized: 0.85 mmol/L    plan: endo consult, 2g IV mg ordered, calcitriol ordered, ,calcium carbonate 1250 with vitamin d ordered. Repeat level in am.   will continue to monitor.

## 2020-01-09 ENCOUNTER — APPOINTMENT (OUTPATIENT)
Dept: ENDOCRINOLOGY | Facility: CLINIC | Age: 45
End: 2020-01-09

## 2020-03-04 ENCOUNTER — OUTPATIENT (OUTPATIENT)
Dept: OUTPATIENT SERVICES | Facility: HOSPITAL | Age: 45
LOS: 1 days | End: 2020-03-04
Payer: MEDICAID

## 2020-03-04 ENCOUNTER — APPOINTMENT (OUTPATIENT)
Dept: UROLOGY | Facility: CLINIC | Age: 45
End: 2020-03-04
Payer: MEDICAID

## 2020-03-04 DIAGNOSIS — E83.50 UNSPECIFIED DISORDER OF CALCIUM METABOLISM: ICD-10-CM

## 2020-03-04 DIAGNOSIS — Z98.890 OTHER SPECIFIED POSTPROCEDURAL STATES: Chronic | ICD-10-CM

## 2020-03-04 DIAGNOSIS — N20.0 CALCULUS OF KIDNEY: Chronic | ICD-10-CM

## 2020-03-04 DIAGNOSIS — N20.0 CALCULUS OF KIDNEY: ICD-10-CM

## 2020-03-04 DIAGNOSIS — E21.0 PRIMARY HYPERPARATHYROIDISM: ICD-10-CM

## 2020-03-04 PROCEDURE — 76775 US EXAM ABDO BACK WALL LIM: CPT

## 2020-03-04 PROCEDURE — 76775 US EXAM ABDO BACK WALL LIM: CPT | Mod: 26

## 2020-03-04 PROCEDURE — 99214 OFFICE O/P EST MOD 30 MIN: CPT | Mod: 25

## 2020-03-04 NOTE — HISTORY OF PRESENT ILLNESS
[None] : no symptoms [FreeTextEntry1] : Pt returns in f/u--He is now 45 yo male with h/o recurrent stones due to hyperparathyroid- now s/p parathyroidectomy, 2 stage with additional lesion s/p mediastinoscopy, thymectomy, resection of parathyroid adenoma (in mediastinum)\par \par Pt notes some passing stones small fragment, possible small fragment but without any pain.\par \par calcium has been low since, and pt with low pth, and requiring 750 mg calcium Tums 3 times per day to combat symptoms of low calcium\par \par Most recent surgical intervention was 9/2019--> had right PCNL and left URS with laser/stone removal on 9/27/19... had second look on 9/29/19.\par

## 2020-03-04 NOTE — ASSESSMENT
[FreeTextEntry1] : Renal US today reviewed- no stone, hydro, solid renal mass. Echogenic focus without shadow, twinkle, at left lower pole. 2 simple-appearing cysts in left kidney.\par \par Doing well, clinically, and renal US excellent.\par \par Diet modification reviewed at length- increasing fluids (primarily water), citrus is good, and decreasing/moderating salt, animal flesh protein, oxalate containing foods, and moderation of calcium intake (1000 mg/day is USRDA).\par \par I reviewed with the patient the risks of metabolic stone disease given their underlying risk parameters (all of which include large stones, multiple stones, bilateral stones, family history, and young age), and the indications for 24 hour urine metabolic assessment.\par \par We also discussed benefits of regular exercise and weight loss as independent risk reducers for stones.\par \par 1. Diet modification as discussed\par 2. 24 hour urine collection x 2 in the next few weeks\par 3. RTC in 8 to 10 weeks\par

## 2020-03-04 NOTE — PHYSICAL EXAM
[General Appearance - Well Developed] : well developed [General Appearance - Well Nourished] : well nourished [Normal Appearance] : normal appearance [Well Groomed] : well groomed [General Appearance - In No Acute Distress] : no acute distress [Respiration, Rhythm And Depth] : normal respiratory rhythm and effort [Exaggerated Use Of Accessory Muscles For Inspiration] : no accessory muscle use [Oriented To Time, Place, And Person] : oriented to person, place, and time [Affect] : the affect was normal [Mood] : the mood was normal [Not Anxious] : not anxious [Normal Station and Gait] : the gait and station were normal for the patient's age [No Focal Deficits] : no focal deficits [Abdomen Soft] : soft [Abdomen Tenderness] : non-tender [FreeTextEntry1] : healed right PCNL site [Costovertebral Angle Tenderness] : no ~M costovertebral angle tenderness [] : no rash [Edema] : no peripheral edema [Urinary Bladder Findings] : the bladder was normal on palpation

## 2020-03-05 ENCOUNTER — APPOINTMENT (OUTPATIENT)
Dept: SURGERY | Facility: CLINIC | Age: 45
End: 2020-03-05
Payer: MEDICAID

## 2020-03-05 DIAGNOSIS — E83.51 HYPOCALCEMIA: ICD-10-CM

## 2020-03-05 PROCEDURE — 36415 COLL VENOUS BLD VENIPUNCTURE: CPT

## 2020-03-05 PROCEDURE — 99213 OFFICE O/P EST LOW 20 MIN: CPT

## 2020-03-05 NOTE — HISTORY OF PRESENT ILLNESS
[de-identified] : Pt 10 months s/p parathyroidectomy and 6 months s/p reoperative Thoracic parathyroidectomy Pt has had low calcium since and has been replacing Calcium with 2 tums daily with occasional numbness or tingling to fingers which is resolved when he takes another Tums

## 2020-03-05 NOTE — ASSESSMENT
[FreeTextEntry1] : Hypocalcemia\par s/p parathyroidectomy\par blood work today\par f/u 6 months\par

## 2020-03-05 NOTE — PHYSICAL EXAM
[de-identified] : well healed scar [Midline] : located in midline position [Normal] : orientation to person, place, and time: normal [de-identified] : Neg Chvostek's sign

## 2020-03-06 LAB
25(OH)D3 SERPL-MCNC: 28.7 NG/ML
CALCIUM SERPL-MCNC: 7.5 MG/DL
CALCIUM SERPL-MCNC: 7.5 MG/DL
PARATHYROID HORMONE INTACT: 6 PG/ML

## 2020-03-06 RX ORDER — CALCITRIOL 0.5 UG/1
0.5 CAPSULE, LIQUID FILLED ORAL
Qty: 60 | Refills: 2 | Status: ACTIVE | COMMUNITY
Start: 2020-03-06 | End: 1900-01-01

## 2020-03-19 DIAGNOSIS — E83.51 HYPOCALCEMIA: ICD-10-CM

## 2020-03-19 DIAGNOSIS — E83.50 UNSPECIFIED DISORDER OF CALCIUM METABOLISM: ICD-10-CM

## 2020-03-19 DIAGNOSIS — E21.0 PRIMARY HYPERPARATHYROIDISM: ICD-10-CM

## 2020-03-19 DIAGNOSIS — N20.0 CALCULUS OF KIDNEY: ICD-10-CM

## 2020-05-15 ENCOUNTER — APPOINTMENT (OUTPATIENT)
Dept: UROLOGY | Facility: CLINIC | Age: 45
End: 2020-05-15

## 2020-10-06 NOTE — ASU PREOP CHECKLIST - SITE MARKED BY ANESTHESIOLOGIST
Asthma ROS: taking medications as instructed, no medication side effects noted.  New concerns: NONE.   Exam: appears well, vitals normal, no respiratory distress, acyanotic, normal RR, chest clear, no wheezing, crepitations, rhonchi, normal symmetric air entry.   Assessment:  Asthma well controlled.   Plan: WIXELLA, PROAIR , ACCUNEB. Current treatment plan is effective, no change in therapy.PFT, CXR IN 1 YEAR  
General weight loss/lifestyle modification strategies discussed (elicit support from others; identify saboteurs; non-food rewards, etc).  Diet interventions: low calorie (1000 kCal/d) deficit diet.  Informal exercise measures discussed, e.g. taking stairs instead of elevator.  
n/a

## 2020-11-04 NOTE — H&P PST ADULT - CIGARETTES, PACKS/DAY
Resume Lyrica.      Continue with ice.    Prescription for hydrocodone to use sparingly.    They will call you about the injection.    Stop warfarin 3 days prior to the injection, then resume it after the shot.  
0.5

## 2020-12-03 ENCOUNTER — APPOINTMENT (OUTPATIENT)
Dept: SURGERY | Facility: CLINIC | Age: 45
End: 2020-12-03

## 2020-12-16 NOTE — CONSULT NOTE ADULT - ATTENDING COMMENTS
Patient presented with new hypocalcemia due to hypoparathyroidism in the setting of 2 prior parathyroid surgeries.  Calcium improved to 7.4 and patient is asymptomatic and requesting to be discharged.  Reviewed importance of continuing calcium carbonate and calcitriol and with close outpatient endocrine follow up [phone].  DC on current regimen of calcium carbonate 1250mg 4 times daily and calcitriol 0.25 mcg daily.   Trend mild hyperphosphatemia as outpatient.  Reviewed with patient that this issue may be transient post op or possibly more long term complication to be determined with more data. no

## 2021-08-25 NOTE — H&P PST ADULT - NSWEIGHTCALCTOOLDRUG_GEN_A_CORE
used Birth Control Pills Pregnancy And Lactation Text: This medication should be avoided if pregnant and for the first 30 days post-partum.

## 2021-11-15 NOTE — H&P PST ADULT - VASCULAR
Eye Protection Verbiage: Before proceeding with the stage, a plastic scleral shield was inserted. The globe was anesthetized with a few drops of 1% lidocaine with 1:100,000 epinephrine. Then, an appropriate sized scleral shield was chosen and coated with lacrilube ointment. The shield was gently inserted and left in place for the duration of each stage. After the stage was completed, the shield was gently removed. Equal and normal pulses (carotid, femoral, dorsalis pedis)

## 2022-05-11 NOTE — PATIENT PROFILE ADULT - NSASFUNCLEVELADLAMBULATE_GEN_A_NUR
0 = independent Fluconazole Counseling:  Patient counseled regarding adverse effects of fluconazole including but not limited to headache, diarrhea, nausea, upset stomach, liver function test abnormalities, taste disturbance, and stomach pain.  There is a rare possibility of liver failure that can occur when taking fluconazole.  The patient understands that monitoring of LFTs and kidney function test may be required, especially at baseline. The patient verbalized understanding of the proper use and possible adverse effects of fluconazole.  All of the patient's questions and concerns were addressed.

## 2023-04-25 NOTE — ED ADULT TRIAGE NOTE - PAIN: PRESENCE, MLM
Referral received for benign heme services, see below.    Referral reason: Anemia, elliptocytosis, 4/20/23 Hgb value 12.3 with smear showing elliptocytes and elevated T bilir    Current abnormal labs: Available in Chart Review    Outreach: Referral discussed with patient.    Plan: Triage instructions updated and sent to NPS for completion. Call warm transferred to NPS.       denies pain/discomfort

## 2023-05-23 NOTE — H&P PST ADULT - GUM GEN PE MLT EXAM PC
How Severe Are Your Spot(S)?: moderate What Is The Reason For Today's Visit?: Upper Body Skin Exam not examined

## 2023-09-07 NOTE — PRE-ANESTHESIA EVALUATION ADULT - LAST CARDIAC ANGIOGRAM/IMAGING
Assessment and Plan:     Problem List Items Addressed This Visit    None    BMI Counseling: Body mass index is 36.28 kg/m². The BMI is above normal. Nutrition recommendations include decreasing portion sizes, encouraging healthy choices of fruits and vegetables, moderation in carbohydrate intake and increasing intake of lean protein. Exercise recommendations include exercising 3-5 times per week. Rationale for BMI follow-up plan is due to patient being overweight or obese. Preventive health issues were discussed with patient, and age appropriate screening tests were ordered as noted in patient's After Visit Summary. Personalized health advice and appropriate referrals for health education or preventive services given if needed, as noted in patient's After Visit Summary. History of Present Illness:     Patient presents for a Medicare Wellness Visit    HPI   Patient Care Team:  J Carlos Krause DO as PCP - General  J Carlos Krause DO as PCP - [address] [place] [address] (RTE)  Horald Northern, DO  Marylene Ravi [facility]  Emiliano Lott MD  [facility] (Endocrinology)  Yosvany James PA-C as Physician Assistant (Physician Assistant)     Review of Systems:     Review of Systems   Constitutional: Negative for activity change, appetite change, diaphoresis, fatigue and fever. HENT: Negative. Eyes: Negative. Respiratory: Negative for apnea, cough, chest tightness, shortness of breath and wheezing. Cardiovascular: Positive for chest pain and palpitations. Negative for leg swelling. Gastrointestinal: Negative for abdominal distention, abdominal pain, anal bleeding, constipation, diarrhea, nausea and vomiting. Endocrine: Negative for cold intolerance, heat intolerance, polydipsia, polyphagia and polyuria. Genitourinary: Negative for difficulty urinating, dysuria, flank pain, hematuria and urgency.    Musculoskeletal: Negative for arthralgias, back pain, gait problem, joint swelling and
myalgias. Skin: Negative for color change, rash and wound. Allergic/Immunologic: Negative for environmental allergies, food allergies and immunocompromised state. Neurological: Negative for dizziness, seizures, syncope, speech difficulty, numbness and headaches. Hematological: Negative for adenopathy. Does not bruise/bleed easily. Psychiatric/Behavioral: Negative for agitation, behavioral problems, hallucinations, sleep disturbance and suicidal ideas.         Problem List:     Patient Active Problem List   Diagnosis   • Lumbar pain   • Cerebral atherosclerosis   • Chronic back pain   • Chronic constipation   • Type 2 diabetes mellitus with both eyes affected by proliferative retinopathy and macular edema, without long-term current use of insulin ([facility])   • Cyst of left breast   • Depression   • Diabetic neuropathy ([facility])   • Hiatal hernia   • Essential hypertension   • Hyperlipidemia   • Mild persistent asthma without complication   • Moderate persistent asthma without complication   • Neuropathy, diabetic ([facility])   • Impaired hearing   • Multinodular goiter   • Hoarseness or changing voice   • Carpal tunnel syndrome on left   • Numbness in both hands   • Carpal tunnel syndrome of left wrist   • Binocular vision disorder with diplopia   • Sixth nerve palsy of right eye   • Subcortical microvascular ischemic occlusive disease   • UTI (urinary tract infection)   • Chronic heart failure ([facility])   • Obesity, morbid ([facility])   • Cerebral meningocele ([facility])   • Myasthenia gravis ([address])   • Depression, recurrent ([facility])   • Abnormal CT scan   • Other headache syndrome   • Chronic kidney disease, stage 3 unspecified ([facility])   • Thyroid nodule   • Chest pain   • Acute kidney injury (PADMINI) with chronic kidney disease stage III   • Coronary artery disease involving native coronary artery of native heart   • Palpitations   • Hypertensive urgency      Past Medical and Surgical History:     Past Medical History:   Diagnosis Date   •
Abnormal ECG     last assessed: 5/15/17   • Abnormal mammogram     last assessed: 17   • Abnormal stress test     last assesed: 17   • Anemia    • Anxiety    • Arthritis    • Asthma    • Back problem    • Breast cyst    • CAD (coronary artery disease)    • Chest pain     last assessed: 17   • Chronic pain disorder    • Cyst of left breast     last assessed: 17   • Depression    • Depression     resolved: 18   • Diabetes mellitus ([facility])    • Hiatal hernia     last assessed: 17   • Hyperlipidemia    • Hypertension    • Keloid of skin     last assessed: 16   • Multiple thyroid nodules    • Neuropathy    • Psychiatric disorder     anxiety   • Stroke [facility])     TIA    • Tuberculosis     as a child      Past Surgical History:   Procedure Laterality Date   • ARM WOUND REPAIR / CLOSURE     • CARPAL TUNNEL RELEASE     • CARPAL TUNNEL RELEASE Left    • CATARACT EXTRACTION Bilateral        • COLONOSCOPY     • CORONARY ANGIOPLASTY WITH STENT PLACEMENT  2023    at [address] [place] [address]   • CYST REMOVAL      right upper arm. • EYE SURGERY      cataract removaql   • FL LUMBAR PUNCTURE DIAGNOSTIC  08/15/2019   • NE ESOPHAGOGASTRODUODENOSCOPY TRANSORAL DIAGNOSTIC N/A 2018    Procedure: ESOPHAGOGASTRODUODENOSCOPY (EGD);   Surgeon: Rhonda Newton DO;  Location: [facility] MAIN OR;  Service: Gastroenterology   • [place] [address] WRST SURG W/RLS TRANSVRS CARPL LIGM Left 2019    Procedure: ENDOSCOPIC CARPAL TUNNEL RELEASE;  Surgeon: Jacob Lopez MD;  Location: [facility] MAIN OR;  Service: Orthopedics   • TUBAL LIGATION     • US GUIDED THYROID BIOPSY  04/15/2019      Family History:     Family History   Problem Relation Age of Onset   • Heart disease Mother    • Diabetes Mother    • Arthritis Mother    • Hypertension Mother    • MONE disease Mother    • Kidney disease Father    • Hypertension Father    • Stomach cancer Father    • Arthritis Sister    • Kidney disease Brother         age 34 
• Stroke Maternal Grandmother    • Stomach cancer Maternal Grandmother    • No Known Problems Maternal Grandfather    • Arthritis Paternal Grandmother    • Heart attack Paternal Grandmother    • No Known Problems Paternal Grandfather    • No Known Problems Daughter    • No Known Problems Daughter    • No Known Problems Daughter    • No Known Problems Daughter    • Stroke Maternal Aunt    • Liver cancer Maternal Aunt    • No Known Problems Maternal Aunt    • No Known Problems Maternal Aunt    • Heart attack Maternal Uncle    • No Known Problems Paternal Aunt    • Cancer Family         spinal column   • Coronary artery disease Family    • Glaucoma Family    • Rheum arthritis Family       Social History:     Social History     Socioeconomic History   • Marital status:      Spouse name: None   • Number of children: None   • Years of education: None   • Highest education level: None   Occupational History   • Occupation: housewife/homemaker   Tobacco Use   • Smoking status: Never   • Smokeless tobacco: Never   Vaping Use   • Vaping Use: Never used   Substance and Sexual Activity   • Alcohol use: Not Currently     Alcohol/week: 0.0 standard drinks of alcohol   • Drug use: No   • Sexual activity: Not Currently     Partners: Male     Comment:  passed 2019 due to cancer   Other Topics Concern   • None   Social History Narrative    Always uses seatbelts    No living will     Social Determinants of Health     Financial Resource Strain: Not on file   Food Insecurity: No Food Insecurity (6/23/2023)    Hunger Vital Sign    • Worried About Running Out of Food in the Last Year: Never true    • Ran Out of Food in the Last Year: Never true   Transportation Needs: No Transportation Needs (6/23/2023)    PRAPARE - Transportation    • Lack of Transportation (Medical): No    • Lack of Transportation (Non-Medical):  No   Recent Concern: Transportation Needs - Unmet Transportation Needs (4/14/2023)    PRAPARE - Transportation
• Lack of Transportation (Medical):  Yes    • Lack of Transportation (Non-Medical): Yes   Physical Activity: Not on file   Stress: Not on file   Social Connections: Not on file   Intimate Partner Violence: Not on file   Housing Stability: Low Risk  (6/23/2023)    Housing Stability Vital Sign    • Unable to Pay for Housing in the Last Year: No    • Number of Places Lived in the Last Year: 1    • Unstable Housing in the Last Year: No      Medications and Allergies:     Current Outpatient Medications   Medication Sig Dispense Refill   • albuterol (PROVENTIL HFA,VENTOLIN HFA) 90 mcg/act inhaler Inhale 2 puffs every 6 (six) hours as needed for wheezing or shortness of breath 54 g 1   • Alcohol Swabs (DropSafe Alcohol Prep) 70 % PADS Use one pad twice daily when check blood sugar 200 each 2   • aspirin (ECOTRIN LOW STRENGTH) 81 mg EC tablet Take 1 tablet (81 mg total) by mouth daily 30 tablet 3   • Blood Glucose Monitoring Suppl (True Metrix Air Glucose Meter) w/Device KIT USE AS DIRECTED 1 kit 0   • carvedilol (COREG) 3.125 mg tablet Take 1 tablet (3.125 mg total) by mouth 2 (two) times a day with meals 60 tablet 2   • dapagliflozin (Farxiga) 10 MG tablet Take 1 tablet (10 mg total) by mouth in the morning 1/2 tablet daily for the 1st month then increase to a full tablet daily in the morning 30 tablet 4   • furosemide (LASIX) 20 mg tablet Take 1 tablet (20 mg total) by mouth daily 90 tablet 3   • gabapentin (NEURONTIN) 300 mg capsule Take 300 mg by mouth 3 (three) times a day     • polyethylene glycol (GLYCOLAX) 17 GM/SCOOP powder Take 17 g by mouth daily Increase to twice daily if still constiptated 578 g 3   • sitaGLIPtin (Januvia) 100 mg tablet Take 1 tablet (100 mg total) by mouth daily 30 tablet 0   • ticagrelor (BRILINTA) 90 MG Take 1 tablet (90 mg total) by mouth every 12 (twelve) hours  0   • TRUEplus Lancets 33G MISC Use 2 (two) times a day 200 each 2   • atorvastatin (LIPITOR) 80 mg tablet Take 1 tablet (80
mg total) by mouth daily with dinner (Patient not taking: Reported on 9/7/2023) 90 tablet 1   • doxazosin (CARDURA) 4 mg tablet Take 1 tablet (4 mg total) by mouth daily at bedtime (Patient not taking: Reported on 9/7/2023) 90 tablet 3   • ezetimibe (ZETIA) 10 mg tablet Take 1 tablet (10 mg total) by mouth daily at bedtime (Patient not taking: Reported on 9/7/2023)  0   • isosorbide mononitrate (IMDUR) 60 mg 24 hr tablet Take 1 tablet (60 mg total) by mouth daily (Patient not taking: Reported on 9/7/2023) 30 tablet 0   • ofloxacin (OCUFLOX) 0.3 % ophthalmic solution  (Patient not taking: Reported on 9/7/2023)     • pantoprazole (PROTONIX) 40 mg tablet Take 1 tablet (40 mg total) by mouth daily (Patient not taking: Reported on 9/7/2023) 30 tablet 0   • potassium chloride (Klor-Con) 10 mEq tablet Take 1 tablet (10 mEq total) by mouth every other day (Patient not taking: Reported on 9/7/2023) 90 tablet 0     No current facility-administered medications for this visit.      Allergies   Allergen Reactions   • Bactrim [Sulfamethoxazole-Trimethoprim] Shortness Of Breath   • Lisinopril Angioedema   • Vicodin [Hydrocodone-Acetaminophen] GI Intolerance   • Hydrocodone-Acetaminophen Nausea Only   • Oxycodone-Acetaminophen GI Intolerance and Nausea Only      Immunizations:     Immunization History   Administered Date(s) Administered   • INFLUENZA 10/18/2016, 08/16/2018   • Influenza Quadrivalent Preservative Free 3 years and older IM 10/18/2016, 08/15/2017   • Influenza, high dose seasonal 0.7 mL 12/01/2020, 11/16/2021, 09/19/2022   • Influenza, recombinant, quadrivalent,injectable, preservative free 08/16/2018, 09/26/2019   • Pneumococcal Conjugate 13-Valent 08/16/2018   • Pneumococcal Conjugate Vaccine 20-valent (Pcv20), Polysace 07/15/2022      Health Maintenance:         Topic Date Due   • Breast Cancer Screening: Mammogram  07/28/2022   • Colorectal Cancer Screening  10/29/2024   • Hepatitis C Screening  Completed
Topic Date Due   • COVID-19 Vaccine (1) Never done   • Influenza Vaccine (1) 09/01/2023      Medicare Screening Tests and Risk Assessments:         Health Risk Assessment:   Patient rates overall health as fair. Patient feels that their physical health rating is slightly better. Patient is satisfied with their life. Eyesight was rated as same. Hearing was rated as same. Patient feels that their emotional and mental health rating is same. Patients states they are never, rarely angry. Patient states they are never, rarely unusually tired/fatigued. Pain experienced in the last 7 days has been some. Patient's pain rating has been 6/10. Patient states that she has experienced no weight loss or gain in last 6 months. Depression Screening:   PHQ-9 Score: 3      Home Safety:  Patient has trouble with stairs inside or outside of their home. Patient has working smoke alarms and has working carbon monoxide detector. Home safety hazards include: none. Nutrition:   Current diet is Regular and Limited junk food. Medications:   Patient is currently taking over-the-counter supplements. OTC medications include: see medication list. Patient is able to manage medications. Activities of Daily Living (ADLs)/Instrumental Activities of Daily Living (IADLs):   Walk and transfer into and out of bed and chair?: Yes  Dress and groom yourself?: Yes    Bathe or shower yourself?: Yes    Feed yourself?  Yes  Do your laundry/housekeeping?: Yes  Manage your money, pay your bills and track your expenses?: No  Make your own meals?: Yes    Do your own shopping?: Yes    ADL comments: Paying bills met by daughter    Previous Hospitalizations:   Any hospitalizations or ED visits within the last 12 months?: Yes    How many hospitalizations have you had in the last year?: 3-4    Advance Care Planning:   Living will: No    Durable POA for healthcare: No    Advanced directive: No    Five wishes given: Yes    Patient declined ACP directive:
No    End of Life Decisions reviewed with patient: Yes    Provider agrees with end of life decisions: Yes      Cognitive Screening:   Provider or family/friend/caregiver concerned regarding cognition?: Yes    PREVENTIVE SCREENINGS      Cardiovascular Screening:    General: Screening Not Indicated and History Lipid Disorder      Diabetes Screening:     General: Screening Not Indicated and History Diabetes      Colorectal Cancer Screening:     General: Screening Current      Breast Cancer Screening:     General: Screening Not Indicated      Cervical Cancer Screening:    General: Screening Not Indicated      Osteoporosis Screening:    General: Screening Not Indicated      Abdominal Aortic Aneurysm (AAA) Screening:        General: Screening Not Indicated      Lung Cancer Screening:     General: Screening Not Indicated      Hepatitis C Screening:    General: Screening Current    Screening, Brief Intervention, and Referral to Treatment (SBIRT)    Screening  Typical number of drinks in a day: 0  Typical number of drinks in a week: 0  Interpretation: Low risk drinking behavior. Single Item Drug Screening:  How often have you used an illegal drug (including marijuana) or a prescription medication for non-medical reasons in the past year? never    Single Item Drug Screen Score: 0  Interpretation: Negative screen for possible drug use disorder    No results found. Physical Exam:     BP (!) 226/98 (BP Location: Left arm, Patient Position: Sitting, Cuff Size: Standard)   Pulse (!) 53   Temp (!) 96 °F (35.6 °C) (Temporal)   Ht 5' 5" (1.651 m)   Wt 98.9 kg (218 lb)   SpO2 98%   BMI 36.28 kg/m²     Physical Exam  Constitutional:       Appearance: She is well-developed. She is obese. HENT:      Head: Normocephalic and atraumatic.       Right Ear: External ear normal.      Left Ear: External ear normal.      Nose: Nose normal.   Eyes:      Conjunctiva/sclera: Conjunctivae normal.      Pupils: Pupils are equal, round,
and reactive to light. Cardiovascular:      Rate and Rhythm: Normal rate and regular rhythm. Heart sounds: Normal heart sounds. No murmur heard. No friction rub. Pulmonary:      Effort: Pulmonary effort is normal. No respiratory distress. Breath sounds: Normal breath sounds. No wheezing or rales. Chest:      Chest wall: No tenderness. Abdominal:      General: Bowel sounds are normal.      Palpations: Abdomen is soft. Musculoskeletal:         General: Normal range of motion. Cervical back: Normal range of motion and neck supple. Skin:     General: Skin is warm and dry. Capillary Refill: Capillary refill takes 2 to 3 seconds. Neurological:      Mental Status: She is alert and oriented to person, place, and time. Psychiatric:         Behavior: Behavior normal.         Thought Content:  Thought content normal.         Judgment: Judgment normal.          Amrita Aguilera, 
denies
denies

## 2023-12-28 NOTE — ASU PREOP CHECKLIST - SPO2 (%)
Can prevent further cerumen impaction by using hydrogen peroxide as needed.  Avoid using any Q-tips in your ears.  On inspection of ears, there was irritation to ear or eardrum which could be irritation from the earwax or could be an infection.  Will send you home with a prescription for amoxicillin that he should take twice a day for 5 days.  If you notice worsening symptoms, worsening pain, hearing loss please come back to the ED for further evaluation.   99

## 2024-01-02 NOTE — H&P PST ADULT - PRO INTERPRETER NEED 2
Detail Level: Detailed
Quality 110: Preventive Care And Screening: Influenza Immunization: Influenza Immunization previously received during influenza season
English

## 2024-10-31 NOTE — BRIEF OPERATIVE NOTE - PROCEDURE POST
<<-----Click on this checkbox to enter Post-Op Dx
<<-----Click on this checkbox to enter Post-Op Dx
31-Oct-2024

## 2025-01-15 NOTE — ASU PREOP CHECKLIST - TEMPERATURE IN CELSIUS (DEGREES C)
[[email or web address]] : Patient's lower back "popped" on Saturday 1/11/2025.  Now he feels it in the upper back.  He still has stiffness.  The R elbow is acting up as well.  He does not feel that he needs pain medication.
36.9
